# Patient Record
Sex: FEMALE | Race: WHITE | Employment: OTHER | URBAN - METROPOLITAN AREA
[De-identification: names, ages, dates, MRNs, and addresses within clinical notes are randomized per-mention and may not be internally consistent; named-entity substitution may affect disease eponyms.]

---

## 2017-01-02 ENCOUNTER — ANTICOAGULATION THERAPY VISIT (OUTPATIENT)
Dept: FAMILY MEDICINE | Facility: CLINIC | Age: 77
End: 2017-01-02
Payer: MEDICARE

## 2017-01-02 DIAGNOSIS — I48.91 ATRIAL FIBRILLATION (H): ICD-10-CM

## 2017-01-02 DIAGNOSIS — Z79.01 LONG-TERM (CURRENT) USE OF ANTICOAGULANTS: Primary | ICD-10-CM

## 2017-01-02 LAB — INR PPP: 2.7

## 2017-01-02 PROCEDURE — 99207 ZZC NO CHARGE NURSE ONLY: CPT | Performed by: INTERNAL MEDICINE

## 2017-01-02 NOTE — PROGRESS NOTES
ANTICOAGULATION FOLLOW-UP CLINIC VISIT    Patient Name:  Malgorzata Lan  Date:  1/2/2017  Contact Type:  Telephone/ [facility]    SUBJECTIVE:     Patient Findings     Positives No Problem Findings           OBJECTIVE    INR   Date Value Ref Range Status   01/02/2017 2.7  Final       ASSESSMENT / PLAN  INR assessment THER    Recheck INR In: 2 WEEKS    INR Location Home INR      Anticoagulation Summary as of 1/2/2017     INR goal 2.0-3.0   Selected INR 2.7 (1/2/2017)   Maintenance plan 5 mg (2.5 mg x 2) every day   Full instructions 5 mg every day   Weekly total 35 mg   No change documented Bharati Saini RN   Plan last modified Bharati Saini RN (10/4/2016)   Next INR check 1/16/2017   Priority INR   Target end date     Indications   Long-term (current) use of anticoagulants [Z79.01] [Z79.01]  Atrial fibrillation (H) [I48.91]         Anticoagulation Episode Summary     INR check location Home Draw    Preferred lab     Send INR reminders to [facility] ANTICOAGULATION    Comments [facility] Home INR      Anticoagulation Care Providers     Provider Role Specialty Phone number    Derek Michelle Crow MD [facility] [phone]            See the Encounter Report to view Anticoagulation Flowsheet and Dosing Calendar (Go to Encounters tab in chart review, and find the Anticoagulation Therapy Visit)    Patient confirms and agrees to dosing schedule as above. She is a home INR patient through [facility]. Dosing based on [facility] Protocol and Provider directed care plan.      Bharati Saini RN

## 2017-01-05 ENCOUNTER — TELEPHONE (OUTPATIENT)
Dept: FAMILY MEDICINE | Facility: CLINIC | Age: 77
End: 2017-01-05

## 2017-01-06 ENCOUNTER — TELEPHONE (OUTPATIENT)
Dept: FAMILY MEDICINE | Facility: CLINIC | Age: 77
End: 2017-01-06

## 2017-01-06 NOTE — TELEPHONE ENCOUNTER
Per patient, she had a nose bleed yesterday that lasted on and off from 10:00 am to 9:00 pm.  She called the [facility] nurse line and they gave her advice that helped her control and eventually stop the nose bleed.  Patient states that she was instructed to let PCP know and that she feels tired and weak today, but is doing better.  Patient states that she is going to make appointment with PCP.  EDWARD Youssef CMA January 6, 2017 12:12 PM

## 2017-01-06 NOTE — TELEPHONE ENCOUNTER
[facility] Medical supplies as medicare is not contracted with [employer]. Pt is almost out of strips.   [facility] [employer] sent over a faxed request for an RX for the strips.

## 2017-01-06 NOTE — TELEPHONE ENCOUNTER
Faxed signed orders to [facility] for patient.  Called patient to let her know that orders were sent.  EDWARD Youssef CMA January 6, 2017 12:07 PM

## 2017-01-06 NOTE — TELEPHONE ENCOUNTER
I bet its the [facility] one  I'm sure I have filled it out in the past - may need to have it resent  It is not in my inbox

## 2017-01-09 DIAGNOSIS — G89.4 CHRONIC PAIN SYNDROME: Primary | ICD-10-CM

## 2017-01-09 NOTE — TELEPHONE ENCOUNTER
Controlled Substance Refill Request for traMADol (ULTRAM) 50 MG tablet  Problem List Complete:  Yes    Last Written Prescription Date:  8/26/2016  Last Fill Quantity: 240,   # refills: 1    Last Office Visit with [facility] primary care provider: 10/18/2016    Clinic visit frequency required: Q 6  months     Future Office visit:     Controlled substance agreement on file: Yes:  Date 8/4/2015.     Processing:  Patient will  in clinic   checked in past 6 months?  No, route to RN

## 2017-01-09 NOTE — TELEPHONE ENCOUNTER
Reason for Call:  Medication or medication refill:    Do you use a [facility]?  Name of the pharmacy and phone number for the current request:       Pt  written script in office      Name of the medication requested: traMADol (ULTRAM) 50 MG tablet    Other request: none    Can we leave a detailed message on this number? YES    Phone number patient can be reached at: Home number on file [phone] (home)    Best Time: anytime    Call taken on 1/9/2017 at 3:32 PM by Maria Ines Calderón

## 2017-01-12 RX ORDER — TRAMADOL HYDROCHLORIDE 50 MG/1
TABLET ORAL
Qty: 240 TABLET | Refills: 1 | Status: SHIPPED | OUTPATIENT
Start: 2017-01-12 | End: 2017-06-01

## 2017-01-13 ENCOUNTER — PRE VISIT (OUTPATIENT)
Dept: CARDIOLOGY | Facility: CLINIC | Age: 77
End: 2017-01-13

## 2017-01-13 ASSESSMENT — CHADS2 SCORE
STROK/TIA/THROM: NO
DIABETES: NO
AGE: >74
HTN: YES
CHF: NO
VASCULAR DISEASE: YES
SEX: FEMALE

## 2017-01-16 LAB — INR PPP: 2.7

## 2017-01-17 ENCOUNTER — ANTICOAGULATION THERAPY VISIT (OUTPATIENT)
Dept: FAMILY MEDICINE | Facility: CLINIC | Age: 77
End: 2017-01-17
Payer: MEDICARE

## 2017-01-17 DIAGNOSIS — I48.91 ATRIAL FIBRILLATION (H): ICD-10-CM

## 2017-01-17 DIAGNOSIS — Z79.01 LONG-TERM (CURRENT) USE OF ANTICOAGULANTS: Primary | ICD-10-CM

## 2017-01-17 PROCEDURE — 99207 ZZC NO CHARGE NURSE ONLY: CPT | Performed by: INTERNAL MEDICINE

## 2017-01-17 NOTE — PROGRESS NOTES
ANTICOAGULATION FOLLOW-UP CLINIC VISIT    Patient Name:  Malgorzata Lan  Date:  1/17/2017  Contact Type:  Telephone    SUBJECTIVE:        OBJECTIVE    INR   Date Value Ref Range Status   01/16/2017 2.7  Final       ASSESSMENT / PLAN  INR assessment THER    Recheck INR In: 2 WEEKS    INR Location Home INR    Billed home INR No      Anticoagulation Summary as of 1/17/2017     INR goal 2.0-3.0   Selected INR 2.7 (1/16/2017)   Maintenance plan 5 mg (2.5 mg x 2) every day   Full instructions 5 mg every day   Weekly total 35 mg   No change documented Marifer Zaragoza RN   Plan last modified Bharati Saini RN (10/4/2016)   Next INR check 1/31/2017   Priority INR   Target end date     Indications   Long-term (current) use of anticoagulants [Z79.01] [Z79.01]  Atrial fibrillation (H) [I48.91]         Anticoagulation Episode Summary     INR check location Home Draw    Preferred lab     Send INR reminders to  ANTICOAGULATION    Comments [facility] Home INR      Anticoagulation Care Providers     Provider Role Specialty Phone number    Derek Michelle Crow MD [facility] [phone]            See the Encounter Report to view Anticoagulation Flowsheet and Dosing Calendar (Go to Encounters tab in chart review, and find the Anticoagulation Therapy Visit)    Dosage adjustment made based on physician directed care plan. Fax INR result received from [facility] INR 2.7 on 1/16/17.   Spoke with patient and she will continue same Warfarin dosing. Recheck 2-3 weeks, per [facility] recommendation.    Marifer Zaragoza RN

## 2017-01-23 ENCOUNTER — PRE VISIT (OUTPATIENT)
Dept: CARDIOLOGY | Facility: CLINIC | Age: 77
End: 2017-01-23

## 2017-01-23 ASSESSMENT — CHADS2 SCORE
HTN: YES
VASCULAR DISEASE: YES
AGE: >74
DIABETES: NO
SEX: FEMALE
CHF: NO
STROK/TIA/THROM: NO

## 2017-01-25 ENCOUNTER — OFFICE VISIT (OUTPATIENT)
Dept: CARDIOLOGY | Facility: CLINIC | Age: 77
End: 2017-01-25
Payer: MEDICARE

## 2017-01-25 VITALS — SYSTOLIC BLOOD PRESSURE: 112 MMHG | HEIGHT: 65 IN | HEART RATE: 82 BPM | DIASTOLIC BLOOD PRESSURE: 68 MMHG

## 2017-01-25 DIAGNOSIS — I50.9 CONGESTIVE HEART FAILURE, UNSPECIFIED CONGESTIVE HEART FAILURE CHRONICITY, UNSPECIFIED CONGESTIVE HEART FAILURE TYPE: ICD-10-CM

## 2017-01-25 DIAGNOSIS — I48.20 CHRONIC ATRIAL FIBRILLATION (H): ICD-10-CM

## 2017-01-25 DIAGNOSIS — I48.91 ATRIAL FIBRILLATION, UNSPECIFIED TYPE (H): Primary | ICD-10-CM

## 2017-01-25 PROCEDURE — 99204 OFFICE O/P NEW MOD 45 MIN: CPT | Performed by: INTERNAL MEDICINE

## 2017-01-25 PROCEDURE — 93000 ELECTROCARDIOGRAM COMPLETE: CPT | Performed by: INTERNAL MEDICINE

## 2017-01-25 RX ORDER — CARVEDILOL 6.25 MG/1
12.5 TABLET ORAL 2 TIMES DAILY WITH MEALS
Qty: 180 TABLET | Refills: 1 | Status: SHIPPED | OUTPATIENT
Start: 2017-01-25 | End: 2017-06-14

## 2017-01-25 RX ORDER — TORSEMIDE 10 MG/1
20 TABLET ORAL AT BEDTIME
COMMUNITY
End: 2017-02-16

## 2017-01-25 NOTE — Clinical Note
2017      Clair Michelle DO    [address], [address]    [place]  [address]       RE: Malgorzata Lan   MRN: [id number]   : 1940      Dear Dr. Michelle:      I saw Ms. Lan for evaluation of atrial fibrillation with fast ventricular rate.  She is a 76-year-old white female who had sinus node dysfunction and previously received a dual-chamber pacemaker in .  Since then, the patient has been known to have atrial fibrillation.  She is currently on digoxin 0.125 mg once a day and carvedilol 6.25 mg p.o. b.i.d.  The patient recently presented to the ER with nose bleeding, and she appeared to have a fast ventricular rate which was also confirmed on pacemaker interrogation.  Her nose bleeding has stopped, and she is on warfarin without rebleeding.  She does not feel palpitation and denies chest pain or shortness of breath.      Her past medical history is quite complicated with severe obesity, history of lung cancer, chronic renal insufficiency, hyperparathyroidism, history of moderate aortic stenosis, obstructive sleep apnea, COPD.      PHYSICAL EXAMINATION:    VITAL SIGNS:  On examination, blood pressure was 112/68, heart rate 82 beats per minute.    GENERAL:  She uses a scooter to move around.   LUNGS:  Clear.   CARDIAC:  The cardiac rhythm was irregularly irregular.  The heart sounds were remote.  There was no murmur.   ABDOMEN:  Showed severe obesity.   EXTREMITIES:  There was no pedal edema.      EKG showed atrial fibrillation with average ventricular rate 74 beats per minute and intermittent ventricular pacing.      ASSESSMENT AND RECOMMENDATIONS:  Ms. Lan is a 76-year-old white female status post dual-chamber pacemaker implant for sinus node dysfunction.  She is now in persistent atrial fibrillation.  Her heart rate is controlled during today's clinic visit, and the patient has no apparent symptoms at the present time.  At this point, I would like to  change the dose of her carvedilol from 6.25 to 12.5 mg p.o. b.i.d.  If she continues to do well, she would not need AV node ablation.  If there is any suspicion of rapid ventricular rate, we will either reinterrogate the pacemaker or have her wear a 24-hour Holter monitor.  Otherwise, she will continue Cardiology followup with Dr. Shine.      Sincerely,            Angel Palmer MD

## 2017-01-25 NOTE — MR AVS SNAPSHOT
After Visit Summary   1/25/2017    Malgorzata Lan    MRN: [id number]           Patient Information     Date Of Birth          1940        Visit Information        Provider Department      1/25/2017 1:45 PM Angel Palmer MD [facility]        Today's Diagnoses     Atrial fibrillation, unspecified type (H)    -  1     Chronic atrial fibrillation (H)         Congestive heart failure, unspecified congestive heart failure chronicity, unspecified congestive heart failure type (H)            Follow-ups after your visit        Your next 10 appointments already scheduled     Mar 08, 2017  3:00 PM   Remote PPM Check with BUCIO TECH1   [facility] ([facility])    [address] [place], [place] [address]  [facility] [address] [phone]           This appointment is for a remote check of your pacemaker.  This is not an appointment at the office.              If Something Goes Wrong     I will check my blood sugars twice a day     Goal Comments - Note created  7/17/2014 10:25 AM by Jeannine Baker RN    As of today's date 7/17/2014 goal is met at 76 - 100%.   Goal Status:  Active        Who to contact     If you have questions or need follow up information about today's clinic visit or your schedule please contact [facility] directly at [phone].  Normal or non-critical lab and imaging results will be communicated to you by MyChart, letter or phone within 4 business days after the clinic has received the results. If you do not hear from us within 7 days, please contact the clinic through MyChart or phone. If you have a critical or abnormal lab result, we will notify you by phone as soon as possible.  Submit refill requests through [employer] or call your pharmacy and they will forward the refill request to us. Please allow 3 business days for your refill to be completed.           "Additional Information About Your Visit        MyChart Information     [email or web address] lets you send messages to your doctor, view your test results, renew your prescriptions, schedule appointments and more. To sign up, go to [email or web address] . Click on \"Log in\" on the left side of the screen, which will take you to the Welcome page. Then click on \"Sign up Now\" on the right side of the page.     You will be asked to enter the access code listed below, as well as some personal information. Please follow the directions to create your username and password.     Your access code is: [email or web address]  Expires: 2017  1:57 PM     Your access code will  in 90 days. If you need help or a new code, please call your [place] clinic or [phone].        Care EveryWhere ID     This is your Care EveryWhere ID. This could be used by other organizations to access your [facility] medical records  [id number]        Your Vitals Were     Pulse Height Last Period             82 1.651 m (5' 5\") (LMP Unknown)          Blood Pressure from Last 3 Encounters:   17 112/68   10/18/16 121/68   16 122/64    Weight from Last 3 Encounters:   10/18/16 129.729 kg (286 lb)   16 127.914 kg (282 lb)   16 127.824 kg (281 lb 12.8 oz)              We Performed the Following     EKG 12-lead complete w/read - Clinics (performed today)          Today's Medication Changes          These changes are accurate as of: 17  1:57 PM.  If you have any questions, ask your nurse or doctor.               These medicines have changed or have updated prescriptions.        Dose/Directions    carvedilol 6.25 MG tablet   Commonly known as:  COREG   This may have changed:  how much to take   Used for:  Chronic atrial fibrillation (H), Congestive heart failure, unspecified congestive heart failure chronicity, unspecified congestive heart failure type (H)   Changed by:  Angel Palmer MD        Dose:  12.5 mg   Take 2 tablets (12.5 mg) by " mouth 2 times daily (with meals)   Quantity:  180 tablet   Refills:  1            Where to get your medicines      These medications were sent to [facility] - [place], [place] - [address] AT [place] [address]  [address], [facility] [address]     Phone:  [phone]    - carvedilol 6.25 MG tablet             Primary Care Provider Office Phone # Fax #    Clair Michelle, [phone]       [facility] [place] [address]  [place] [address]        Goals        Patient-Stated    I will call my PCP right away if I have increased shortness of breath.       Goal Comments - Note created  7/17/2014 10:27 AM by Jeannine Baker RN    As of today's date 7/17/2014 goal is met at 51 - 75%.   Goal Status:  Active      I will weigh myself daily and records results     Goal Comments - Note created  7/17/2014 10:25 AM by Jeannine Baker RN    As of today's date 7/17/2014 goal is met at 76 - 100%.   Goal Status:  Active        Thank you!     Thank you for choosing [facility] [place]  for your care. Our goal is always to provide you with excellent care. Hearing back from our patients is one way we can continue to improve our services. Please take a few minutes to complete the written survey that you may receive in the mail after your visit with us. Thank you!             Your Updated Medication List - Protect others around you: Learn how to safely use, store and throw away your medicines at www.disposemymeds.org.          This list is accurate as of: 1/25/17  1:57 PM.  Always use your most recent med list.                   Brand Name Dispense Instructions for use    acetaminophen 650 MG 8 hour tablet     100 tablet    Take 650 mg by mouth every 6 hours as needed for mild pain       albuterol (5 MG/ML) 0.5% neb solution    PROVENTIL    60 vial    Take 0.5 mLs (2.5 mg) by nebulization every 4 hours as needed for wheezing or shortness  of breath / dyspnea (dyspnea)       alum & mag hydroxide-simethicone 400-400-40 MG/5ML Susp suspension    MYLANTA ES/MAALOX  ES     Take 15-30 mLs by mouth every 4 hours as needed for indigestion       ASPIRIN NOT PRESCRIBED    INTENTIONAL    0 each    Antiplatelet medication not prescribed intentionally due to Current anticoagulant therapy (warfarin/enoxaparin)       atorvastatin 40 MG tablet    LIPITOR    90 tablet    Take 1 tablet (40 mg) by mouth At Bedtime       betamethasone valerate 0.1 % ointment    VALISONE    15 g    Apply topically daily To inside of ears.       blood glucose monitoring test strip    no brand specified    3 Month    Use to test blood sugars 3 -4  times daily or as directed       budesonide 0.5 MG/2ML neb solution    PULMICORT    60 ampule    Take 2 mLs (0.5 mg) by nebulization 2 times daily as needed       calcitonin (salmon) 200 UNIT/ACT nasal spray    MIACALCIN    3.7 mL    Spray 1 spray into one nostril alternating nostrils daily Alternate nostril each day. Right Nostril on Even Days Left Nostril on Odd Days       carvedilol 6.25 MG tablet    COREG    180 tablet    Take 2 tablets (12.5 mg) by mouth 2 times daily (with meals)       CINNAMON PO      Take 1,000 mg by mouth daily       cod liver oil Caps capsule      Take 1 capsule by mouth daily       digoxin 125 MCG tablet    LANOXIN    30 tablet    Take 1 tablet (125 mcg) by mouth daily       fluticasone 50 MCG/ACT spray    FLONASE    3 Bottle    Spray 1-2 sprays into both nostrils daily       latanoprost 0.005 % ophthalmic solution    XALATAN     Place 1 drop into both eyes At Bedtime.       lisinopril 5 MG tablet    PRINIVIL/ZESTRIL    90 tablet    Take 1 tablet (5 mg) by mouth daily       meclizine 25 MG tablet    ANTIVERT    180 tablet    Take 1 tablet (25 mg) by mouth 3 times daily as needed for dizziness       megestrol 40 MG tablet    MEGACE         nitroglycerin 0.4 MG sublingual tablet    NITROSTAT    30 tablet    If chest pain  place under tongue call 911 if pain not resolved/ repeat every 5 min for total of 3 doses       NovoLIN MIX 70/30 VIAL injection   Generic drug:  insulin NPH-insulin regular     10 mL    Inject 30 Units Subcutaneous 2 times daily (with meals)       oxybutynin 5 MG tablet    DITROPAN    180 tablet    Take 1 tablet (5 mg) by mouth 2 times daily       pentoxifylline 400 MG CR tablet    TRENtal    90 tablet    Take 1 tablet (400 mg) by mouth 3 times daily (with meals)       polyethylene glycol Packet    MIRALAX/GLYCOLAX     Take 1 packet by mouth 2 times daily as needed for constipation       potassium chloride SA 20 MEQ CR tablet    K-DUR/KLOR-CON M    180 tablet    Take 1 tablet (20 mEq) by mouth 2 times daily       saccharomyces boulardii 250 MG capsule    FLORASTOR    14 capsule    Take 1 capsule (250 mg) by mouth 2 times daily       sennosides 8.6 MG tablet    SENOKOT     Take 1-4 tablets by mouth daily as needed for constipation       torsemide 10 MG tablet    DEMADEX     Take 20 mg by mouth At Bedtime       traMADol 50 MG tablet    ULTRAM    240 tablet    TAKE 1-2 TABLET BY MOUTH EVERY 6 HOURS AS NEEDED FOR PAIN       Vitamin B 12 100 MCG Lozg     100 lozenge    Take 1,000 mcg by mouth daily       vitamin D3 2000 UNITS Caps     90 capsule    Take 2,000 Units by mouth daily       * Warfarin Therapy Reminder      1 each continuous prn       * JANTOVEN 2.5 MG tablet   Generic drug:  warfarin     100 tablet    TK 3 TO 4 TS PO QD OR UTD BY INR CLINIC       * Notice:  This list has 2 medication(s) that are the same as other medications prescribed for you. Read the directions carefully, and ask your doctor or other care provider to review them with you.

## 2017-01-25 NOTE — PROGRESS NOTES
HPI and Plan:   See dictation    Orders Placed This Encounter   Procedures     EKG 12-lead complete w/read - Clinics (performed today)       Orders Placed This Encounter   Medications     torsemide (DEMADEX) 10 MG tablet     Sig: Take 20 mg by mouth At Bedtime     carvedilol (COREG) 6.25 MG tablet     Sig: Take 2 tablets (12.5 mg) by mouth 2 times daily (with meals)     Dispense:  180 tablet     Refill:  1       Medications Discontinued During This Encounter   Medication Reason     torsemide (DEMADEX) 10 MG tablet Medication Reconciliation Clean Up     carvedilol (COREG) 6.25 MG tablet Reorder         Encounter Diagnoses   Name Primary?     Atrial fibrillation, unspecified type (H) Yes     Chronic atrial fibrillation (H)      Congestive heart failure, unspecified congestive heart failure chronicity, unspecified congestive heart failure type (H)        CURRENT MEDICATIONS:  Current Outpatient Prescriptions   Medication Sig Dispense Refill     torsemide (DEMADEX) 10 MG tablet Take 20 mg by mouth At Bedtime       carvedilol (COREG) 6.25 MG tablet Take 2 tablets (12.5 mg) by mouth 2 times daily (with meals) 180 tablet 1     traMADol (ULTRAM) 50 MG tablet TAKE 1-2 TABLET BY MOUTH EVERY 6 HOURS AS NEEDED FOR PAIN 240 tablet 1     meclizine (ANTIVERT) 25 MG tablet Take 1 tablet (25 mg) by mouth 3 times daily as needed for dizziness 180 tablet 1     digoxin (LANOXIN) 125 MCG tablet Take 1 tablet (125 mcg) by mouth daily 30 tablet 11     calcitonin, salmon, (MIACALCIN) 200 UNIT/ACT nasal spray Spray 1 spray into one nostril alternating nostrils daily Alternate nostril each day. Right Nostril on Even Days Left Nostril on Odd Days 3.7 mL 3     fluticasone (FLONASE) 50 MCG/ACT nasal spray Spray 1-2 sprays into both nostrils daily 3 Bottle 3     potassium chloride SA (K-DUR,KLOR-CON M) 20 MEQ tablet Take 1 tablet (20 mEq) by mouth 2 times daily 180 tablet 1     albuterol (PROVENTIL) (5 MG/ML) 0.5% nebulizer solution Take 0.5 mLs  (2.5 mg) by nebulization every 4 hours as needed for wheezing or shortness of breath / dyspnea (dyspnea) 60 vial 1     atorvastatin (LIPITOR) 40 MG tablet Take 1 tablet (40 mg) by mouth At Bedtime 90 tablet 3     budesonide (PULMICORT) 0.5 MG/2ML nebulizer solution Take 2 mLs (0.5 mg) by nebulization 2 times daily as needed 60 ampule 1     Cyanocobalamin (VITAMIN B 12) 100 MCG LOZG Take 1,000 mcg by mouth daily 100 lozenge 1     Cholecalciferol (VITAMIN D3) 2000 UNITS CAPS Take 2,000 Units by mouth daily 90 capsule 1     NOVOLIN MIX 70/30 VIAL (70-30) 100 UNIT/ML susp Inject 30 Units Subcutaneous 2 times daily (with meals) 10 mL 11     lisinopril (PRINIVIL,ZESTRIL) 5 MG tablet Take 1 tablet (5 mg) by mouth daily 90 tablet 3     nitroglycerin (NITROSTAT) 0.4 MG SL tablet If chest pain place under tongue call 911 if pain not resolved/ repeat every 5 min for total of 3 doses 30 tablet 3     oxybutynin (DITROPAN) 5 MG tablet Take 1 tablet (5 mg) by mouth 2 times daily 180 tablet 2     pentoxifylline (TRENTAL) 400 MG CR tablet Take 1 tablet (400 mg) by mouth 3 times daily (with meals) 90 tablet 3     JANTOVEN 2.5 MG tablet TK 3 TO 4 TS PO QD OR UTD BY INR CLINIC 100 tablet 3     megestrol (MEGACE) 40 MG tablet   1     saccharomyces boulardii (FLORASTOR) 250 MG capsule Take 1 capsule (250 mg) by mouth 2 times daily 14 capsule      sennosides (SENOKOT) 8.6 MG tablet Take 1-4 tablets by mouth daily as needed for constipation       acetaminophen 650 MG 8 hour tablet Take 650 mg by mouth every 6 hours as needed for mild pain 100 tablet      alum & mag hydroxide-simethicone (MYLANTA ES/MAALOX  ES) 400-400-40 MG/5ML SUSP Take 15-30 mLs by mouth every 4 hours as needed for indigestion  0     Cod Liver Oil CAPS Take 1 capsule by mouth daily       polyethylene glycol (MIRALAX/GLYCOLAX) packet Take 1 packet by mouth 2 times daily as needed for constipation        CINNAMON PO Take 1,000 mg by mouth daily       latanoprost (XALATAN)  0.005 % ophthalmic solution Place 1 drop into both eyes At Bedtime.       blood glucose monitoring (NO BRAND SPECIFIED) test strip Use to test blood sugars 3 -4  times daily or as directed 3 Month 3     ASPIRIN NOT PRESCRIBED (INTENTIONAL) Antiplatelet medication not prescribed intentionally due to Current anticoagulant therapy (warfarin/enoxaparin) 0 each 0     betamethasone valerate (VALISONE) 0.1 % ointment Apply topically daily To inside of ears. 15 g 0     [DISCONTINUED] carvedilol (COREG) 6.25 MG tablet Take 1 tablet (6.25 mg) by mouth 2 times daily (with meals) 180 tablet 1     Warfarin Therapy Reminder 1 each continuous prn         ALLERGIES     Allergies   Allergen Reactions     Food Anaphylaxis     With green peppers     Codeine Sulfate Swelling     Throats swell     Flagyl [Metronidazole Hcl] Swelling     Hydrocodone Visual Disturbance     Pantoprazole Unknown     Penicillins Unknown     Hasn't had it for 30 years     Diltiazem Rash       PAST MEDICAL HISTORY:  Past Medical History   Diagnosis Date     COPD (chronic obstructive pulmonary disease) (H)      Glaucoma      High cholesterol      Chronic leg pain      Hypertension      Diabetes mellitus (H)      Obstructive sleep apnea      wears CPAP     Diastolic dysfunction      Influenza A with pneumonia 3-2014     hospitalized      Respiratory failure (H) 3-2014     hospitalized     Congestive heart failure, unspecified      worsened during hospitalization/ diastolic failure  echo 3-2014 EF 55-60%/      Aortic stenosis, moderate      on echo 3-14      Peripheral vascular disease (H)      Morbid obesity (H)      DJD (degenerative joint disease)      Depression      Hyperparathyroidism (H)      Sinus node dysfunction (H)      TACHYBRADY SYNDROME s/p PPM     Syncope      Hyperlipidemia      Renal insufficiency      Pickwickian syndrome (H)      Squamous cell carcinoma of lung, stage I (H)      Pancytopenia (H)      Lung cancer (H)      Persistent atrial  fibrillation (H) 06/08/2004       PAST SURGICAL HISTORY:  Past Surgical History   Procedure Laterality Date     Tonsils       Orthopedic surgery       t oes     Cholecystectomy       Sinus surgery       Bone marrow biopsy, bone specimen, needle/trocar  10/30/2013     Procedure: BIOPSY BONE MARROW;  BONE MARROW BIOPSY ;  Surgeon: Kimani Lobo MD;  Location:  GI     Implant pacemaker  7/31/2013     Single chamber [place] PPM      Thoracoscopic wedge resection lung Right 3/17/2015     Procedure: THORACOSCOPIC WEDGE RESECTION LUNG;  Surgeon: Santos Crenshaw MD;  Location:  OR     Irrigation and debridement lower extremity, combined Right 7/10/2015     Procedure: COMBINED IRRIGATION AND DEBRIDEMENT LOWER EXTREMITY;  Surgeon: Yandel Fair MD;  Location:  OR     Graft skin split thickness from extremity Right 8/4/2015     Procedure: GRAFT SKIN SPLIT THICKNESS FROM EXTREMITY;  Surgeon: Yandel Fair MD;  Location:  SD     Apply wound vac Right 8/4/2015     Procedure: APPLY WOUND VAC;  Surgeon: Yandel Fair MD;  Location:  SD     Dilation and curettage N/A 3/18/2016     Procedure: DILATION AND CURETTAGE;  Surgeon: Gilmer Cruz MD;  Location:  OR     Davinci hysterectomy total, bilateral salpingo-oophorectomy, node dissection, combined N/A 3/30/2016     Procedure: COMBINED DAVINCI HYSTERECTOMY TOTAL, SALPINGO-OOPHORECTOMY, NODE DISSECTION;  Surgeon: Kristin Arriaza MD;  Location:  OR       FAMILY HISTORY:  Family History   Problem Relation Age of Onset     CANCER Mother 83     Lung cancer     GASTROINTESTINAL DISEASE Father 69     Ulcer bleed     HEART DISEASE Brother 58     CANCER Sister 60     Lung cancer     CANCER Sister 58     Lung cancer       SOCIAL HISTORY:  Social History     Social History     Marital Status:      Spouse Name: N/A     Number of Children: N/A     Years of Education: N/A     Social History Main Topics     Smoking  status: Former Smoker -- 1.00 packs/day for 30 years     Types: Cigarettes     Quit date: 08/14/1993     Smokeless tobacco: Never Used     Alcohol Use: 0.0 oz/week     0 Standard drinks or equivalent per week      Comment: 2 a week      Drug Use: No     Sexual Activity:     Partners: Male     Other Topics Concern     Parent/Sibling W/ Cabg, Mi Or Angioplasty Before 65f 55m? No     Caffeine Concern Yes     3-4 cups of coffee     Special Diet No     Exercise No     Social History Narrative        _______        Malgorzata Lan  MRN# [id number]     YOB: 1940  Age: 75 year old         Date of Admission: 8/4/2015    Date of Discharge: 8/5/2015    Admitting Physician: Yandel Fair MD    Discharging Service: [facility] General Surgery     Primary Provider: Michelle Reed (General)        Discharge Diagnosis:     Principle Diagnosis:    right calf wound    Wound, open    Calf ulcer, right, with unspecified severity        Brief HPI: Malgorzata Lan is a 75 year old year-old female who presented to the [facility] for an elective split thickness skin grafting. She was being followed closely as an outpatient in wound clinic by Dr. Fair. She recently developed a significant ulceration in her right anterior lateral calf. Operative debridement was undertaken previously with good results with plans for eventual STSG which was undertaken 8/5/15 without complications.    Hospital Course: Malgorzata Lan underwent the above named procedure without complications. Please see op note for further details. The patient had a routine hospital course and recovered as anticipated. By POD #1, she had advanced to a regular diet and was transitioned successfully to oral pain medications. She was deemed ready for discharge on POD #1. She will follow-up in wound clinic on Monday 8/10/15 as previously scheduled for wound check.         Inpatient Consultations: No consultations were requested  "during this admission        Procedures:     Split thickness skin grafting        Labs/Imaging:     Results for orders placed or performed during the hospital encounter of 08/04/15 (from the past 24 hour(s))     Potassium     Result  Value  Ref Range      Potassium  4.2  3.4 - 5.3 mmol/L     INR     Result  Value  Ref Range      INR  2.84 (H)  0.86 - 1.14     Hemoglobin     Result  Value  Ref Range      Hemoglobin  9.0 (L)  11.7 - 15.7 g/dL     Glucose by meter     Result  Value  Ref Range      Glucose  183 (H)  70 - 99 mg/dL     Glucose by meter     Result  Value  Ref Range      Glucose  154 (H)  70 - 99 mg/dL     Glucose by meter     Result  Value  Ref Range      Glucose  142 (H)  70 - 99 mg/dL     Glucose by meter     Result  Value  Ref Range      Glucose  126 (H)  70 - 99 mg/dL     Glucose by meter     Result  Value  Ref Range      Glucose  118 (H)  70 - 99 mg/dL     INR     Result  Value  Ref Range      INR  3.89 (H)  0.86 - 1.14             Disposition:     Discharged to home         Discharge Condition    Discharge condition:  Stable     Discharge vitals:  Blood pressure 112/59, pulse 83, temperature 97.8  F (36.6  C), temperature source Oral, resp. rate 16, height 1.651 m (5' 5\"), weight 134.718 kg (297 lb), SpO2 100 %, not currently breastfeeding.         Discharge Medications:     Current Discharge Medication List         START taking these medications      Details     !! oxyCODONE-acetaminophen (PERCOCET) 5-325 MG per tablet  Take 1-2 tablets by mouth every 6 hours as needed for moderate to severe pain    Qty: 20 tablet, Refills: 0      Associated Diagnoses: Acute post-operative pain         !! - Potential duplicate medications found. Please discuss with provider.         CONTINUE these medications which have NOT CHANGED      Details     !! oxyCODONE-acetaminophen (PERCOCET) 5-325 MG per tablet  Take 1 tablet by mouth every 6 hours as needed for moderate to severe pain    Qty: 120 tablet, Refills: 0     "  Associated Diagnoses: Chronic pain         insulin NPH-insulin regular (NOVOLIN MIX 70/30 VIAL) VIAL 100 UNITS/ML susp  20 units before breakfast 10 units before dinner    Qty: 3 Month, Refills: 3      Associated Diagnoses: Diabetes mellitus, type 2         pentoxifylline (TRENTAL) 400 MG CR tablet  Take 1 tablet (400 mg) by mouth 3 times daily (with meals) Office visit due for additional refills. Call [phone] to schedule.    Qty: 90 tablet, Refills: 0      Associated Diagnoses: Cramp of limb         torsemide (DEMADEX) 10 MG tablet  Take 2 daily may on occasion double to 4 a day prn edema    Qty: 270 tablet, Refills: 1      Associated Diagnoses: Cardiomyopathy         fluticasone (FLONASE) 50 MCG/ACT nasal spray  Spray 1-2 sprays into both nostrils daily    Qty: 1 Package, Refills: 11      Associated Diagnoses: Right chronic serous otitis media         atorvastatin (LIPITOR) 40 MG tablet  Take 1 tablet (40 mg) by mouth daily Office visit with fasting labs due for additional refills. Call [phone] to schedule.    Qty: 30 tablet, Refills: 0      Associated Diagnoses: Other and unspecified hyperlipidemia         traMADol (ULTRAM) 50 MG tablet  Take 1 tablet (50 mg) by mouth every 6 hours as needed for moderate pain    Qty: 120 tablet, Refills: 3      Associated Diagnoses: Peripheral vascular disease         polyethylene glycol (MIRALAX/GLYCOLAX) packet  Take 1 packet by mouth daily as needed         lisinopril (PRINIVIL,ZESTRIL) 10 MG tablet  Take 0.5 tablets (5 mg) by mouth daily    Qty: 90 tablet, Refills: 3      Associated Diagnoses: Hypertension         potassium chloride SA (K-DUR,KLOR-CON M) 20 MEQ tablet  Take 1 tablet (20 mEq) by mouth 2 times daily    Qty: 180 tablet, Refills: 1      Associated Diagnoses: Hypertension; Congestive heart failure, unspecified         meclizine (ANTIVERT) 25 MG tablet  Take 1 tablet (25 mg) by mouth 4 times daily as needed    Qty: 360 tablet, Refills: 3      Associated  Diagnoses: Dizzy         calcitonin, salmon, (MIACALCIN) 200 UNIT/ACT nasal spray  Spray 1 spray into one nostril alternating nostrils daily Alternate nostril each day.    Qty: 3 Bottle, Refills: 3      Associated Diagnoses: Osteoporosis, unspecified         oxybutynin (DITROPAN) 5 MG tablet  TAKE 1 TABLET BY MOUTH TWICE DAILY    Qty: 180 tablet, Refills: 3      Associated Diagnoses: Unspecified urinary incontinence         carvedilol (COREG) 6.25 MG tablet  Take 1 tablet (6.25 mg) by mouth 2 times daily (with meals)    Qty: 60 tablet, Refills: 1      Associated Diagnoses: Cardiomyopathy         betamethasone valerate (VALISONE) 0.1 % ointment  Apply topically 2 times daily    Qty: 15 g, Refills: 0         Cholecalciferol (VITAMIN D3 PO)  Take 2,000 Units by mouth daily         budesonide (PULMICORT) 0.5 MG/2ML nebulizer solution  Take 2 mLs (0.5 mg) by nebulization 2 times daily    Qty: 3 Box, Refills: 1      Associated Diagnoses: COPD exacerbation         Cyanocobalamin (VITAMIN B 12 PO)  Take 1,000 mcg by mouth daily         CINNAMON PO  Take 1,000 mg by mouth daily         latanoprost (XALATAN) 0.005 % ophthalmic solution  Place 1 drop into both eyes At Bedtime.         WARFARIN SODIUM PO  Take 5 mg by mouth daily         collagenase ointment  Apply topically daily    Qty: 90 g, Refills: 3      Associated Diagnoses: Open wound         lidocaine (XYLOCAINE) 2 % jelly  Apply topically as needed for moderate pain    Qty: 85 mL, Refills: 3      Associated Diagnoses: Open wound         ipratropium - albuterol 0.5 mg/2.5 mg/3 mL (DUONEB) 0.5-2.5 (3) MG/3ML nebulization  Take 1 vial (3 mLs) by nebulization 4 times daily    Qty: 360 mL, Refills: 5      Associated Diagnoses: COPD exacerbation         albuterol (PROVENTIL) (5 MG/ML) 0.5% nebulizer solution  Take 0.5 mLs (2.5 mg) by nebulization every 4 hours as needed for wheezing or shortness of breath / dyspnea (dyspnea)    Qty: 60 vial      Associated Diagnoses: COPD  exacerbation         Nitroglycerin (NITROSTAT SL)  Place 0.4 mg under the tongue Repeat in 5 min x 2 if not gone.         !! - Potential duplicate medications found. Please discuss with provider.                 Discharge Instructions:     Follow-up Appointments     Follow-up and recommended labs and tests     Follow up with Dr. Fair , at (location with clinic name or city)     [facility] wound clinic, as previously scheduled. No follow up labs or test     are needed.     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 The following problems were addressed during her hospitalization:    RLE Non-healing wound    long-standing right lower extremity wound, nonhealing, with purulent debris.     Previously biopsied to rule out cancer.     X-ray of the lower extremity as well as an ultrasound of the arterial flow was completed without acute abnormality per report.     Treated with ABX initially with Vanco and Ertapenem.     Seen by Surgery Dr. Fair and taken for debridement of the wound on 7/10.     Also seen by ID. Symptoms improved with regards to pain s/p debridement.     Wound cultures + for E.Coli, Pseudomonas, GNR and enterococcus. BC 1 of 2 on 7/8 + for Strep viridans. Repeats on 7/9 and 7/10 NGTD.     Suspect contaminant.     Antibiotics not continued on discharge as debridement was felt to be sufficient.     To follow up with Dr. Fair in the [facility] to have a wound vac placed for 1 week and then to undergo a skin graft. Continue with Aquacel for wound.         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 To  Follow with [facility].         H/o Chronic atrial fibrillation/CHF/HTN/HLD:     The patient has a pacemaker. Last echocardiogram from 6/2014 was 55% to 60%. She currently demonstrates no signs of volume overload, but does have some chronic lower extremity edema. Fluids discontinued, but intake remains below baseline with borderline BP. Continue with prior to admission lisinopril, Torsemide and Coreg. Warfarin held for procedure but resumed post op. INR was 1.43 on d/c. Bridged with Lovenox gien her CHADS2 of 4.         Insulin-dependent diabetes mellitus:     Recent hemoglobin A1c is 6.4%. Home regimen includes Novolin 70/30 insulin 30 units with breakfast and 20-26 units with dinner. Was on 15 units due to decreased po intake and increased to 20 Q am 7/11. C/w 10 units at dinner. This is the dose she was discharged on. To adjust back to her pta dose as needed.         Chronic kidney disease, stage 3:     Baseline creatinine is 1.1-1.3. Currently at her baseline.         Pancytopenia:     This is chronic. She also has a history of MGUS with a negative bone marrow biopsy in 2013. Iron studies, b12, folate, retic, ps completed.             Consultations This Hospital Stay     SOCIAL WORK IP CONSULT    VASCULAR SURGERY IP CONSULT    INFECTIOUS DISEASES IP CONSULT        INR     Home RN to draw and send to PCP/INR Clinic.     Home care nursing referral     Home care PT referral     Home care OT referral     Follow Up and recommended labs and tests     Follow up with primary care provider, Michelle Reed within 14 days for hospital follow- up. No follow up labs or test are needed.    Follow up with Dr. Fair at the [facility] for continued RLE wound care. Plan is for a wound vac x 1 week to be applied in clinic followed by a skin graft.     Discharge Instructions     Continue with Lovenox BID until INR 2-3.    Adjust Lantus based on Finger sticks when you return home. You were on 30 units in the morning and 20 Units  "at night prior to coming to the hospital. I have reduced this to 20 units in the morning and 10 units at night due to you not requiring as much insulin here.     MD face to face encounter     Nurse is needed for complex aftercare of surgical procedures because the patient needs instruction and cannot perform care on their own due to location of wound and steps involved.     Physical therapy services are needed to assess and treat the following functional impairments: Deconditioning .     Occupational therapy services are needed to assess and treat cognitive ability and address ADL safety due to impairment in Ambulation and deconditioning.                Results for orders placed or performed during the hospital encounter of 07/08/15     US Lower Extremity Arterial Duplex Right      Narrative           Impression      IMPRESSION:    1. Patent right lower extremity arterial vessels without evidence of    significant stenotic or occlusive disease.    2. Right popliteal cyst.          Impression: Deep venous insufficiency in the right common femoral vein    and superficial venous insufficiency in the right greater saphenous    vein as described above.        HAROON VANG MD                Review of Systems:  Skin:  Negative       Eyes:  Positive for glasses    ENT:  Negative      Respiratory:  Positive for cough     Cardiovascular:    Positive for;palpitations;dizziness;lightheadedness    Gastroenterology: Negative      Genitourinary:  Negative      Musculoskeletal:  Negative      Neurologic:  Negative      Psychiatric:  Negative      Heme/Lymph/Imm:  Positive for allergies    Endocrine:  Positive for diabetes      Physical Exam:  Vitals: /68 mmHg  Pulse 82  Ht 1.651 m (5' 5\")  Wt   LMP  (LMP Unknown)    Constitutional:  cooperative;alert and oriented;well developed;well nourished morbidly obese      Skin:  warm and dry to the touch        Head:  normocephalic        Eyes:  pupils equal and round;sclera " white        ENT:  no pallor or cyanosis        Neck:  carotid pulses are full and equal bilaterally;JVP normal;no carotid bruit        Chest:  clear to auscultation     few left base rales.     Cardiac: regular rhythm;normal S1 and S2 irregularly irregular rhythm     systolic murmur grade 1        Abdomen:  abdomen soft;non-tender obese      Vascular: pulses full and equal                                        Extremities and Back:  no deformities, clubbing, cyanosis, erythema observed stasis pigmentation bilateral LE edema;1+;pitting     tight bilateral lower extremity edema    Neurological:  affect appropriate, oriented to time, person and place   in a wheelchair          CC  Clair Michelle, [facility]  [address]  [place] [address]

## 2017-01-26 NOTE — PROGRESS NOTES
2017      Clair Michelle DO    [address], [address]    [place]  [address]       RE: Malgorzata Lan   MRN: [id number]   : 1940      Dear Dr. Michelle:      I saw Ms. Lan for evaluation of atrial fibrillation with fast ventricular rate.  She is a 76-year-old white female who had sinus node dysfunction and previously received a dual-chamber pacemaker in .  Since then, the patient has been known to have atrial fibrillation.  She is currently on digoxin 0.125 mg once a day and carvedilol 6.25 mg p.o. b.i.d.  The patient recently presented to the ER with nose bleeding, and she appeared to have a fast ventricular rate which was also confirmed on pacemaker interrogation.  Her nose bleeding has stopped, and she is on warfarin without rebleeding.  She does not feel palpitation and denies chest pain or shortness of breath.      Her past medical history is quite complicated with severe obesity, history of lung cancer, chronic renal insufficiency, hyperparathyroidism, history of moderate aortic stenosis, obstructive sleep apnea, COPD.      PHYSICAL EXAMINATION:    VITAL SIGNS:  On examination, blood pressure was 112/68, heart rate 82 beats per minute.    GENERAL:  She uses a scooter to move around.   LUNGS:  Clear.   CARDIAC:  The cardiac rhythm was irregularly irregular.  The heart sounds were remote.  There was no murmur.   ABDOMEN:  Showed severe obesity.   EXTREMITIES:  There was no pedal edema.      EKG showed atrial fibrillation with average ventricular rate 74 beats per minute and intermittent ventricular pacing.      ASSESSMENT AND RECOMMENDATIONS:  Ms. Lan is a 76-year-old white female status post dual-chamber pacemaker implant for sinus node dysfunction.  She is now in persistent atrial fibrillation.  Her heart rate is controlled during today's clinic visit, and the patient has no apparent symptoms at the present time.  At this point, I would like to  change the dose of her carvedilol from 6.25 to 12.5 mg p.o. b.i.d.  If she continues to do well, she would not need AV node ablation.  If there is any suspicion of rapid ventricular rate, we will either reinterrogate the pacemaker or have her wear a 24-hour Holter monitor.  Otherwise, she will continue Cardiology followup with Dr. Shine.      Sincerely,            MD VIANEY Cote MD             D: 2017 14:02   T: 2017 22:43   MT: RICHI      Name:     HEATHER PRINCE   MRN:      [id number]        Account:      [id number]   :      1940           Service Date: 2017      Document: [id number]

## 2017-01-31 ENCOUNTER — ANTICOAGULATION THERAPY VISIT (OUTPATIENT)
Dept: NURSING | Facility: CLINIC | Age: 77
End: 2017-01-31
Payer: MEDICARE

## 2017-01-31 ENCOUNTER — TELEPHONE (OUTPATIENT)
Dept: FAMILY MEDICINE | Facility: CLINIC | Age: 77
End: 2017-01-31

## 2017-01-31 DIAGNOSIS — Z79.01 LONG-TERM (CURRENT) USE OF ANTICOAGULANTS: Primary | ICD-10-CM

## 2017-01-31 DIAGNOSIS — I48.91 ATRIAL FIBRILLATION, UNSPECIFIED TYPE (H): ICD-10-CM

## 2017-01-31 LAB — INR PPP: 2.2

## 2017-01-31 PROCEDURE — 99207 ZZC NO CHARGE NURSE ONLY: CPT | Performed by: INTERNAL MEDICINE

## 2017-01-31 NOTE — TELEPHONE ENCOUNTER
I called and left  for patient letting her know that 2/20/17 for recheck date is fine and I updated the anticoagulation encounter.    Britt Jacobo RN

## 2017-01-31 NOTE — TELEPHONE ENCOUNTER
Reason for call: INR  Patient called regarding (reason for call): Pt says she got the approval to go three weeks in between checking her INR so she will come back on 2/20.   Additional comments: N/A    Phone Number Pt can be reached at: Home number on file [phone] (home)  Best Time: anytime, if needed  Can we leave a detailed message on this number? YES

## 2017-01-31 NOTE — PROGRESS NOTES
ANTICOAGULATION FOLLOW-UP CLINIC VISIT    Patient Name:  Malgorzata Lan  Date:  1/31/2017  Contact Type:  Telephone/ left detailed VM for Malgorzata with dosing instructions.    SUBJECTIVE:        OBJECTIVE    INR   Date Value Ref Range Status   01/31/2017 2.2  Final       ASSESSMENT / PLAN  INR assessment THER    Recheck INR In: 2 WEEKS    INR Location Home INR      Anticoagulation Summary as of 1/31/2017     INR goal 2.0-3.0   Selected INR 2.2 (1/31/2017)   Maintenance plan 5 mg (2.5 mg x 2) every day   Full instructions 5 mg every day   Weekly total 35 mg   No change documented Britt Jacobo, RN   Plan last modified Bharati Saini RN (10/4/2016)   Next INR check 2/14/2017   Priority INR   Target end date     Indications   Long-term (current) use of anticoagulants [Z79.01] [Z79.01]  Atrial fibrillation (H) [I48.91]         Anticoagulation Episode Summary     INR check location Home Draw    Preferred lab     Send INR reminders to CS ANTICOAGULATION    Comments ALERE Home INR      Anticoagulation Care Providers     Provider Role Specialty Phone number    Reed, Michelle Crow MD [facility] [phone]            See the Encounter Report to view Anticoagulation Flowsheet and Dosing Calendar (Go to Encounters tab in chart review, and find the Anticoagulation Therapy Visit)    Dosage adjustment made based on physician directed care plan.  Left detailed VM for Malgorzata with dosing instructions.   Recheck in 2 weeks.    Britt Jacobo, RN

## 2017-02-16 ENCOUNTER — TELEPHONE (OUTPATIENT)
Dept: FAMILY MEDICINE | Facility: CLINIC | Age: 77
End: 2017-02-16

## 2017-02-16 DIAGNOSIS — I50.9 CHF (CONGESTIVE HEART FAILURE) (H): Primary | ICD-10-CM

## 2017-02-16 RX ORDER — TORSEMIDE 10 MG/1
20 TABLET ORAL AT BEDTIME
Qty: 180 TABLET | Refills: 3 | Status: SHIPPED | OUTPATIENT
Start: 2017-02-16

## 2017-02-16 NOTE — TELEPHONE ENCOUNTER
I called patient on this first. She got in touch with her endocrinologist and they were able to straighten the issue out with insurance on this one. No further action needed at this point.    Ciaran Hair, [facility]

## 2017-02-16 NOTE — TELEPHONE ENCOUNTER
Patient called to request refill for torsemide was last seen by Dr. Palmer on 1/27/17 no changes to medicaition.  Refill sent to requested pharmacy.

## 2017-02-20 LAB — INR PPP: 3.6

## 2017-02-21 ENCOUNTER — ANTICOAGULATION THERAPY VISIT (OUTPATIENT)
Dept: NURSING | Facility: CLINIC | Age: 77
End: 2017-02-21
Payer: MEDICARE

## 2017-02-21 ENCOUNTER — TELEPHONE (OUTPATIENT)
Dept: FAMILY MEDICINE | Facility: CLINIC | Age: 77
End: 2017-02-21

## 2017-02-21 DIAGNOSIS — I48.91 ATRIAL FIBRILLATION, UNSPECIFIED TYPE (H): ICD-10-CM

## 2017-02-21 DIAGNOSIS — Z79.01 LONG-TERM (CURRENT) USE OF ANTICOAGULANTS: ICD-10-CM

## 2017-02-21 PROCEDURE — 99207 ZZC NO CHARGE NURSE ONLY: CPT | Performed by: INTERNAL MEDICINE

## 2017-02-21 NOTE — PROGRESS NOTES
ANTICOAGULATION FOLLOW-UP CLINIC VISIT    Patient Name:  Malgorzata Lan  Date:  2/21/2017  Contact Type:  Telephone/ Spoke with patient and she reports that she took a fall last week. Possible higher INR due to inflammation.     SUBJECTIVE:     Patient Findings     Positives Inflammation    Comments Patient took a fall last week on hip. She has been laying flat in bed for longer than normal.            OBJECTIVE    INR   Date Value Ref Range Status   02/20/2017 3.6  Final       ASSESSMENT / PLAN  INR assessment SUPRA    Recheck INR In: 1 WEEK    INR Location Home INR      Anticoagulation Summary as of 2/21/2017     INR goal 2.0-3.0   Today's INR 3.6! (2/20/2017)   Maintenance plan 5 mg (2.5 mg x 2) every day   Full instructions 2/21: 2.5 mg; Otherwise 5 mg every day   Weekly total 35 mg   Plan last modified Bharati Saini RN (10/4/2016)   Next INR check 2/27/2017   Priority INR   Target end date     Indications   Long-term (current) use of anticoagulants [Z79.01] [Z79.01]  Atrial fibrillation (H) [I48.91]         Anticoagulation Episode Summary     INR check location Home Draw    Preferred lab     Send INR reminders to CS ANTICOAGULATION    Comments ALERE Home INR      Anticoagulation Care Providers     Provider Role Specialty Phone number    Derek Michelle Crow MD [facility] [phone]            See the Encounter Report to view Anticoagulation Flowsheet and Dosing Calendar (Go to Encounters tab in chart review, and find the Anticoagulation Therapy Visit)    Dosage adjustment made based on physician directed care plan.  Patient took a fall last week and is equating the higher INR to inflammation from fall.   Will have patient do 2.5 mg tonight and then resume normal dose.   Recheck in 1 week.       Britt Jacobo RN

## 2017-02-21 NOTE — MR AVS SNAPSHOT
Malgorzata Barrazalee ann   2/21/2017   Anticoagulation Therapy Visit    Description:  76 year old female   Provider:  Clair Michelle DO   Department:  [facility] Nurse           INR as of 2/21/2017     Today's INR 3.6! (2/20/2017)      Anticoagulation Summary as of 2/21/2017     INR goal 2.0-3.0   Today's INR 3.6! (2/20/2017)   Full instructions 2/21: 2.5 mg; Otherwise 5 mg every day   Next INR check 2/27/2017    Indications   Long-term (current) use of anticoagulants [Z79.01] [Z79.01]  Atrial fibrillation (H) [I48.91]         Contact Numbers     Clinic Number:         February 2017 Details    Sun Mon Tue Wed Thu Fri Sat        1               2               3               4                 5               6               7               8               9               10               11                 12               13               14               15               16               17               18                 19               20               21      2.5 mg   See details      22      5 mg         23      5 mg         24      5 mg         25      5 mg           26      5 mg         27            28                    Date Details   02/21 This INR check       Date of next INR:  2/27/2017         How to take your warfarin dose     To take:  2.5 mg Take 1 of the 2.5 mg tablets.    To take:  5 mg Take 2 of the 2.5 mg tablets.

## 2017-02-21 NOTE — TELEPHONE ENCOUNTER
Left VM for patient to call clinic back and speak with INR RN.  We received Alere INR result from 2/20/17 and was 3.6.  I am wondering if there has been any changes to dose, diet, illnesses, medications that could be causing higher INR.  See Anticoagulation encounter from 2/21/17.     Britt Jacobo RN

## 2017-02-24 ENCOUNTER — TELEPHONE (OUTPATIENT)
Dept: FAMILY MEDICINE | Facility: CLINIC | Age: 77
End: 2017-02-24

## 2017-02-24 NOTE — TELEPHONE ENCOUNTER
Dr Cordova received a form from [address] regarding TENS unit for this patient. I called the patient and she reports that she did not request any TENS unit or any other kind of medical supply item of the sort. She also does not know of any provider who may have requested this. Patient is therefore asking that we ignore this request from [address]    Ciaran Hair [facility]

## 2017-02-28 ENCOUNTER — ANTICOAGULATION THERAPY VISIT (OUTPATIENT)
Dept: NURSING | Facility: CLINIC | Age: 77
End: 2017-02-28
Payer: MEDICARE

## 2017-02-28 DIAGNOSIS — I48.91 ATRIAL FIBRILLATION, UNSPECIFIED TYPE (H): ICD-10-CM

## 2017-02-28 DIAGNOSIS — Z79.01 LONG-TERM (CURRENT) USE OF ANTICOAGULANTS: ICD-10-CM

## 2017-02-28 LAB — INR PPP: 2.3

## 2017-02-28 PROCEDURE — 99207 ZZC NO CHARGE NURSE ONLY: CPT | Performed by: INTERNAL MEDICINE

## 2017-02-28 NOTE — PROGRESS NOTES
ANTICOAGULATION FOLLOW-UP CLINIC VISIT    Patient Name:  Malgorzata Lan  Date:  2/28/2017  Contact Type:  Telephone/ Spoke with patient and provided dosing instructions.     SUBJECTIVE:        OBJECTIVE    INR   Date Value Ref Range Status   02/28/2017 2.3  Final       ASSESSMENT / PLAN  INR assessment THER    Recheck INR In: 2 WEEKS    INR Location Home INR      Anticoagulation Summary as of 2/28/2017     INR goal 2.0-3.0   Today's INR 2.3   Maintenance plan 5 mg (2.5 mg x 2) every day   Full instructions 5 mg every day   Weekly total 35 mg   No change documented Britt Jacobo RN   Plan last modified Bharati Saini RN (10/4/2016)   Next INR check 3/14/2017   Priority INR   Target end date     Indications   Long-term (current) use of anticoagulants [Z79.01] [Z79.01]  Atrial fibrillation (H) [I48.91]         Anticoagulation Episode Summary     INR check location Home Draw    Preferred lab     Send INR reminders to CS ANTICOAGULATION    Comments ALERE Home INR      Anticoagulation Care Providers     Provider Role Specialty Phone number    Derek Michelle Crow MD [facility] [phone]            See the Encounter Report to view Anticoagulation Flowsheet and Dosing Calendar (Go to Encounters tab in chart review, and find the Anticoagulation Therapy Visit)    Dosage adjustment made based on physician directed care plan.  Spoke with patient on the phone and provided dosing instructions. Return back to normal dose following fall which may have caused INR to increase slightly last week.     Britt Jacobo, RN

## 2017-02-28 NOTE — MR AVS SNAPSHOT
Malgorzata Barrazalee ann   2/28/2017   Anticoagulation Therapy Visit    Description:  76 year old female   Provider:  Clair Michelle DO   Department:  [facility] Nurse           INR as of 2/28/2017     Today's INR 2.3      Anticoagulation Summary as of 2/28/2017     INR goal 2.0-3.0   Today's INR 2.3   Full instructions 5 mg every day   Next INR check 3/14/2017    Indications   Long-term (current) use of anticoagulants [Z79.01] [Z79.01]  Atrial fibrillation (H) [I48.91]         Contact Numbers     Clinic Number:         February 2017 Details    Sun Mon Tue Wed Thu Fri Sat        1               2               3               4                 5               6               7               8               9               10               11                 12               13               14               15               16               17               18                 19               20               21               22               23               24               25                 26               27               28      5 mg   See details           Date Details   02/28 This INR check               How to take your warfarin dose     To take:  5 mg Take 2 of the 2.5 mg tablets.           March 2017 Details    Sun Mon Tue Wed Thu Fri Sat        1      5 mg         2      5 mg         3      5 mg         4      5 mg           5      5 mg         6      5 mg         7      5 mg         8      5 mg         9      5 mg         10      5 mg         11      5 mg           12      5 mg         13      5 mg         14            15               16               17               18                 19               20               21               22               23               24               25                 26               27               28               29               30               31                 Date Details   No additional details    Date of next INR:  3/14/2017         How to take your  warfarin dose     To take:  5 mg Take 2 of the 2.5 mg tablets.

## 2017-03-01 ENCOUNTER — TELEPHONE (OUTPATIENT)
Dept: FAMILY MEDICINE | Facility: CLINIC | Age: 77
End: 2017-03-01

## 2017-03-01 DIAGNOSIS — Z79.899 POLYPHARMACY: Primary | ICD-10-CM

## 2017-03-02 NOTE — TELEPHONE ENCOUNTER
Patient returned call, transferred to nurse.  Patient states she spoke with a male from clinic last week regarding request for TENS.  See telephone note dated 2/24/17.  Patient does state she has never even heard of this company and is also not followed by PT, has not been for about 2 years.  Patient is not needing TENS.  Patient was inquiring about xray of foot completed on October, states she did not ever receive results.  Reviewed with her that a letter was sent out, she states she did not receive.  Reviewed letter with her and offered to resend, she would appreciate.  Patient also requesting to meet with someone to go through all of her medications, states she has so many.  Reviewed MTM visit with her, she is interested.  Order pended. Please sign if ok and correct order.  Patient had no further questions.  F/u appointment also scheduled.  Brenda Mendoza RN

## 2017-03-02 NOTE — TELEPHONE ENCOUNTER
Received form from  [employer] asking me to fill out a form re: TENS unit. Did she request this? Did she have physical therapy eval? There are a lot of specific questions so may be best that she works with physical therapy if she hasn't already in regards to filling this out. As a reference, I have it sitting on top of my desk.  Thanks!

## 2017-03-06 ENCOUNTER — TELEPHONE (OUTPATIENT)
Dept: PHARMACY | Facility: OTHER | Age: 77
End: 2017-03-06

## 2017-03-06 NOTE — TELEPHONE ENCOUNTER
MTM referral from: [facility] visit (referral by provider)    MTM referral outreach attempt #1 on March 6, 2017 at 1:18 PM      Outcome: Patient is not interested at this time because insurance does not cover MTM and she is not ACO for 2017, will route to MTM Pharmacist/Provider as an FYI. Thank you for the referral.     Nunu Cabezas, MTM Coordinator

## 2017-03-08 ENCOUNTER — ALLIED HEALTH/NURSE VISIT (OUTPATIENT)
Dept: CARDIOLOGY | Facility: CLINIC | Age: 77
End: 2017-03-08
Payer: MEDICARE

## 2017-03-08 DIAGNOSIS — I50.9 CONGESTIVE HEART FAILURE, UNSPECIFIED CONGESTIVE HEART FAILURE CHRONICITY, UNSPECIFIED CONGESTIVE HEART FAILURE TYPE: ICD-10-CM

## 2017-03-08 DIAGNOSIS — I73.9 PERIPHERAL VASCULAR DISEASE (H): ICD-10-CM

## 2017-03-08 DIAGNOSIS — R32 UNSPECIFIED URINARY INCONTINENCE: ICD-10-CM

## 2017-03-08 DIAGNOSIS — M81.0 OSTEOPOROSIS: ICD-10-CM

## 2017-03-08 DIAGNOSIS — I42.9 CARDIOMYOPATHY (H): ICD-10-CM

## 2017-03-08 DIAGNOSIS — Z95.0 CARDIAC PACEMAKER IN SITU: Primary | ICD-10-CM

## 2017-03-08 PROCEDURE — 93294 REM INTERROG EVL PM/LDLS PM: CPT | Performed by: INTERNAL MEDICINE

## 2017-03-08 PROCEDURE — 93296 REM INTERROG EVL PM/IDS: CPT | Performed by: INTERNAL MEDICINE

## 2017-03-08 NOTE — TELEPHONE ENCOUNTER
Potassium chl, lisinopril      Last Written Prescription Date: 08/26/16  Last Fill Quantity: 90/180, # refills: 1/3  Last Office Visit with [facility], [facility] or  Health prescribing provider: 10/18/16  Next 5 appointments (look out 90 days)     Apr 11, 2017  3:00 PM CDT   Office Visit with Clair Michelle,    [facility]    [address] [facility] [address]   [phone]                   Potassium   Date Value Ref Range Status   06/08/2016 3.7 3.4 - 5.3 mmol/L Final     Creatinine   Date Value Ref Range Status   06/15/2016 1.25 mg/dL Final     BP Readings from Last 3 Encounters:   01/25/17 112/68   10/18/16 121/68   09/14/16 122/64     trental           Last Written Prescription Date: 08/26/16  Last Fill Quantity: 90, # refills: 3    Last Office Visit with [facility], [facility] or  Health prescribing provider:  10/18/16   Future Office Visit:    Next 5 appointments (look out 90 days)     Apr 11, 2017  3:00 PM CDT   Office Visit with Clair Michelle DO   [facility]    [address] [facility] [address]   [phone]                   Lab Results   Component Value Date    WBC 1.9 04/22/2016     Lab Results   Component Value Date    RBC 3.44 04/22/2016     Lab Results   Component Value Date    HGB 10.3 04/22/2016     Lab Results   Component Value Date    HCT 30.6 04/22/2016     No components found for: MCT  Lab Results   Component Value Date    MCV 89 04/22/2016     Lab Results   Component Value Date    MCH 29.9 04/22/2016     Lab Results   Component Value Date    MCHC 33.7 04/22/2016     Lab Results   Component Value Date    RDW 15.0 04/22/2016     Lab Results   Component Value Date    PLT 88 04/22/2016     Lab Results   Component Value Date    AST 19 06/15/2016     Lab Results   Component Value Date    ALT 20 04/17/2016     Creatinine   Date Value Ref Range Status   06/15/2016 1.25 mg/dL Final   ]  ditropan      Last Written Prescription  Date: 08/26/16  Last Fill Quantity: 90,  # refills: 3   Last Office Visit with [facility], [facility] or [facility] prescribing provider: 10/18/16                                         Next 5 appointments (look out 90 days)     Apr 11, 2017  3:00 PM CDT   Office Visit with Clair Michelle DO   [facility]    [address] [facility] [address]   [phone]

## 2017-03-08 NOTE — PROGRESS NOTES
[employer]avantio (S) Remote PPM Device Check  : 13 %, AFIB, taking Jantoven  Mode: VVI        Presenting Rhythm:  with some VS events  Heart Rate: Rates 50-160bpm per histogram  Sensing: Stable    Pacing Threshold: Stable    Impedance: Stable  Battery Status: 7 years  Atrial Arrhythmia: N/A  Ventricular Arrhythmia: 444 ventricular high rates. 11 EGMs available show irregular VS events for RVR lasting 4 seconds to 3 minutes, average rates 170-230bpm. Reviewed findings with MORRIS Busch.       Care Plan: F/u PPM Latitude q 3 months. Gave patient results over the phone. Trudy,CVT

## 2017-03-08 NOTE — TELEPHONE ENCOUNTER
Reason for Call:  Medication or medication refill:    Do you use a [facility]?  Name of the pharmacy and phone number for the current request:     [facility] [address] [place] [address] [address] AT [facility] [address]        Name of the medication requested: potassium chloride SA (K-DUR,KLOR-CON M) 20 MEQ tablet  pentoxifylline (TRENTAL) 400 MG CR tablet  oxybutynin (DITROPAN) 5 MG tablet  lisinopril (PRINIVIL,ZESTRIL) 5 MG tablet    Other request: pt is requesting 3 month supply on all   Re sending encounter bc previous encounter was sent to Dr. Reed    Can we leave a detailed message on this number? YES    Phone number patient can be reached at: Home number on file [phone] (home)    Best Time: anytime    Call taken on 3/8/2017 at 3:41 PM by Vanessa Núñez

## 2017-03-08 NOTE — MR AVS SNAPSHOT
After Visit Summary   3/8/2017    Malgorzata Lan    MRN: [id number]           Patient Information     Date Of Birth          1940        Visit Information        Provider Department      3/8/2017 3:00 PM BUCIO TECH1 [facility] [place]        Today's Diagnoses     Cardiac pacemaker in situ    -  1       Follow-ups after your visit        Your next 10 appointments already scheduled     Mar 08, 2017  3:00 PM CST   Remote PPM Check with BUCIO TECH1   [facility] ([facility])    [address] [address] [phone]           This appointment is for a remote check of your pacemaker.  This is not an appointment at the office.            Apr 11, 2017  3:00 PM CDT   Office Visit with Clair Michelle,    [facility] ([facility]    [address] [facility] [address] [phone]           Bring a current list of meds and any records pertaining to this visit.  For Physicals, please bring immunization records and any forms needing to be filled out.  Please arrive 10 minutes early to complete paperwork.              If Something Goes Wrong     I will check my blood sugars twice a day     Notes - Note created  7/17/2014 10:25 AM by Jeannine Baker RN    As of today's date 7/17/2014 goal is met at 76 - 100%.   Goal Status:  Active        Who to contact     If you have questions or need follow up information about today's clinic visit or your schedule please contact [facility] directly at [phone].  Normal or non-critical lab and imaging results will be communicated to you by MyChart, letter or phone within 4 business days after the clinic has received the results. If you do not hear from us within 7 days, please contact the clinic through MyChart or phone. If you have a critical or abnormal lab result, we will notify you  "by phone as soon as possible.  Submit refill requests through [employer] or call your pharmacy and they will forward the refill request to us. Please allow 3 business days for your refill to be completed.          Additional Information About Your Visit        [email or web address] Information     [employer] lets you send messages to your doctor, view your test results, renew your prescriptions, schedule appointments and more. To sign up, go to [email or web address] . Click on \"Log in\" on the left side of the screen, which will take you to the Welcome page. Then click on \"Sign up Now\" on the right side of the page.     You will be asked to enter the access code listed below, as well as some personal information. Please follow the directions to create your username and password.     Your access code is: [email or web address]  Expires: 2017  1:57 PM     Your access code will  in 90 days. If you need help or a new code, please call your [facility] clinic or [phone].        Care EveryWhere ID     This is your Care EveryWhere ID. This could be used by other organizations to access your [facility] medical records  [id number]        Your Vitals Were     Last Period                   (LMP Unknown)            Blood Pressure from Last 3 Encounters:   17 112/68   10/18/16 121/68   16 122/64    Weight from Last 3 Encounters:   10/18/16 129.7 kg (286 lb)   16 127.9 kg (282 lb)   16 127.8 kg (281 lb 12.8 oz)              We Performed the Following     INTERROGATION DEVICE EVAL REMOTE, PACER/ICD (25063)     PM DEVICE INTERROGATE REMOTE (72477)        Primary Care Provider Office Phone # Fax #    Clair Michelle,  [phone]       [facility] [place] [address]  [place] [address]        Goals        General    I will call my PCP right away if I have increased shortness of breath.   (pt-stated)     Notes - Note created  2014 10:27 AM by Jeannine Baker RN    As of today's date 2014 " goal is met at 51 - 75%.   Goal Status:  Active      I will weigh myself daily and records results (pt-stated)     Notes - Note created  7/17/2014 10:25 AM by Jeannine Baker RN    As of today's date 7/17/2014 goal is met at 76 - 100%.   Goal Status:  Active        Thank you!     Thank you for choosing [facility] [place]  for your care. Our goal is always to provide you with excellent care. Hearing back from our patients is one way we can continue to improve our services. Please take a few minutes to complete the written survey that you may receive in the mail after your visit with us. Thank you!             Your Updated Medication List - Protect others around you: Learn how to safely use, store and throw away your medicines at www.disposemymeds.org.          This list is accurate as of: 3/8/17 10:39 AM.  Always use your most recent med list.                   Brand Name Dispense Instructions for use    acetaminophen 650 MG 8 hour tablet     100 tablet    Take 650 mg by mouth every 6 hours as needed for mild pain       albuterol (5 MG/ML) 0.5% neb solution    PROVENTIL    60 vial    Take 0.5 mLs (2.5 mg) by nebulization every 4 hours as needed for wheezing or shortness of breath / dyspnea (dyspnea)       alum & mag hydroxide-simethicone 400-400-40 MG/5ML Susp suspension    MYLANTA ES/MAALOX  ES     Take 15-30 mLs by mouth every 4 hours as needed for indigestion       ASPIRIN NOT PRESCRIBED    INTENTIONAL    0 each    Antiplatelet medication not prescribed intentionally due to Current anticoagulant therapy (warfarin/enoxaparin)       atorvastatin 40 MG tablet    LIPITOR    90 tablet    Take 1 tablet (40 mg) by mouth At Bedtime       betamethasone valerate 0.1 % ointment    VALISONE    15 g    Apply topically daily To inside of ears.       blood glucose monitoring test strip    no brand specified    3 Month    Use to test blood sugars 3 -4  times daily or as directed       budesonide  0.5 MG/2ML neb solution    PULMICORT    60 ampule    Take 2 mLs (0.5 mg) by nebulization 2 times daily as needed       calcitonin (salmon) 200 UNIT/ACT nasal spray    MIACALCIN    3.7 mL    Spray 1 spray into one nostril alternating nostrils daily Alternate nostril each day. Right Nostril on Even Days Left Nostril on Odd Days       carvedilol 6.25 MG tablet    COREG    180 tablet    Take 2 tablets (12.5 mg) by mouth 2 times daily (with meals)       CINNAMON PO      Take 1,000 mg by mouth daily       cod liver oil Caps capsule      Take 1 capsule by mouth daily       digoxin 125 MCG tablet    LANOXIN    30 tablet    Take 1 tablet (125 mcg) by mouth daily       fluticasone 50 MCG/ACT spray    FLONASE    3 Bottle    Spray 1-2 sprays into both nostrils daily       latanoprost 0.005 % ophthalmic solution    XALATAN     Place 1 drop into both eyes At Bedtime.       lisinopril 5 MG tablet    PRINIVIL/ZESTRIL    90 tablet    Take 1 tablet (5 mg) by mouth daily       meclizine 25 MG tablet    ANTIVERT    180 tablet    Take 1 tablet (25 mg) by mouth 3 times daily as needed for dizziness       megestrol 40 MG tablet    MEGACE         nitroglycerin 0.4 MG sublingual tablet    NITROSTAT    30 tablet    If chest pain place under tongue call 911 if pain not resolved/ repeat every 5 min for total of 3 doses       NovoLIN MIX 70/30 VIAL injection   Generic drug:  insulin NPH-insulin regular     10 mL    Inject 30 Units Subcutaneous 2 times daily (with meals)       oxybutynin 5 MG tablet    DITROPAN    180 tablet    Take 1 tablet (5 mg) by mouth 2 times daily       pentoxifylline 400 MG CR tablet    TRENtal    90 tablet    Take 1 tablet (400 mg) by mouth 3 times daily (with meals)       polyethylene glycol Packet    MIRALAX/GLYCOLAX     Take 1 packet by mouth 2 times daily as needed for constipation       potassium chloride SA 20 MEQ CR tablet    K-DUR/KLOR-CON M    180 tablet    Take 1 tablet (20 mEq) by mouth 2 times daily        saccharomyces boulardii 250 MG capsule    FLORASTOR    14 capsule    Take 1 capsule (250 mg) by mouth 2 times daily       sennosides 8.6 MG tablet    SENOKOT     Take 1-4 tablets by mouth daily as needed for constipation       torsemide 10 MG tablet    DEMADEX    180 tablet    Take 2 tablets (20 mg) by mouth At Bedtime       traMADol 50 MG tablet    ULTRAM    240 tablet    TAKE 1-2 TABLET BY MOUTH EVERY 6 HOURS AS NEEDED FOR PAIN       Vitamin B 12 100 MCG Lozg     100 lozenge    Take 1,000 mcg by mouth daily       vitamin D3 2000 UNITS Caps     90 capsule    Take 2,000 Units by mouth daily       * Warfarin Therapy Reminder      1 each continuous prn       * JANTOVEN 2.5 MG tablet   Generic drug:  warfarin     100 tablet    TK 3 TO 4 TS PO QD OR UTD BY INR CLINIC       * Notice:  This list has 2 medication(s) that are the same as other medications prescribed for you. Read the directions carefully, and ask your doctor or other care provider to review them with you.

## 2017-03-10 DIAGNOSIS — I73.9 PERIPHERAL VASCULAR DISEASE (H): ICD-10-CM

## 2017-03-10 RX ORDER — PENTOXIFYLLINE 400 MG/1
TABLET, EXTENDED RELEASE ORAL
Qty: 270 TABLET | Refills: 0 | OUTPATIENT
Start: 2017-03-10

## 2017-03-10 RX ORDER — PENTOXIFYLLINE 400 MG/1
400 TABLET, EXTENDED RELEASE ORAL
Qty: 90 TABLET | Refills: 0 | Status: SHIPPED | OUTPATIENT
Start: 2017-03-10 | End: 2017-05-11

## 2017-03-10 RX ORDER — POTASSIUM CHLORIDE 1500 MG/1
20 TABLET, EXTENDED RELEASE ORAL 2 TIMES DAILY
Qty: 180 TABLET | Refills: 0 | Status: SHIPPED | OUTPATIENT
Start: 2017-03-10 | End: 2017-08-09

## 2017-03-10 RX ORDER — LISINOPRIL 5 MG/1
5 TABLET ORAL DAILY
Qty: 90 TABLET | Refills: 0 | Status: SHIPPED | OUTPATIENT
Start: 2017-03-10 | End: 2017-08-07

## 2017-03-10 RX ORDER — OXYBUTYNIN CHLORIDE 5 MG/1
5 TABLET ORAL 2 TIMES DAILY
Qty: 180 TABLET | Refills: 0 | Status: SHIPPED | OUTPATIENT
Start: 2017-03-10 | End: 2017-04-11

## 2017-03-10 NOTE — TELEPHONE ENCOUNTER
Prescription approved per [facility].  Brenda Mendoza RN

## 2017-03-10 NOTE — TELEPHONE ENCOUNTER
pentoxifylline (TRENTAL) 400 MG CR tablet           Last Written Prescription Date: 3/10/207  Last Fill Quantity: 90, # refills: 0    Last Office Visit with [facility], [facility] or [facility] prescribing provider:  10/18/2016   Future Office Visit:    Next 5 appointments (look out 90 days)     Apr 11, 2017  3:00 PM CDT   Office Visit with Clair Michelle, DO   [facility] ([facility])    [address] [facility] [address]   [phone]                   Lab Results   Component Value Date    WBC 1.9 04/22/2016     Lab Results   Component Value Date    RBC 3.44 04/22/2016     Lab Results   Component Value Date    HGB 10.3 04/22/2016     Lab Results   Component Value Date    HCT 30.6 04/22/2016     No components found for: MCT  Lab Results   Component Value Date    MCV 89 04/22/2016     Lab Results   Component Value Date    MCH 29.9 04/22/2016     Lab Results   Component Value Date    MCHC 33.7 04/22/2016     Lab Results   Component Value Date    RDW 15.0 04/22/2016     Lab Results   Component Value Date    PLT 88 04/22/2016     Lab Results   Component Value Date    AST 19 06/15/2016     Lab Results   Component Value Date    ALT 20 04/17/2016     Creatinine   Date Value Ref Range Status   06/15/2016 1.25 mg/dL Final   ]

## 2017-03-13 RX ORDER — OXYBUTYNIN CHLORIDE 5 MG/1
TABLET ORAL
Qty: 180 TABLET | Refills: 0 | OUTPATIENT
Start: 2017-03-13

## 2017-03-13 RX ORDER — PENTOXIFYLLINE 400 MG/1
TABLET, EXTENDED RELEASE ORAL
Qty: 90 TABLET | Refills: 0 | OUTPATIENT
Start: 2017-03-13

## 2017-03-13 NOTE — TELEPHONE ENCOUNTER
I do not know how to order a 90 day supply of this Calcitonin spray.  The other two were already ordered last Friday.    Please order what is appropriate.  Thank you,    Olive Reyes RN

## 2017-03-13 NOTE — TELEPHONE ENCOUNTER
oxybutynin (DITROPAN) 5 MG tablet 180 tablet 0 3/10/2017          Last Written Prescription Date: 03/10/2017  Last Fill Quantity: 180,  # refills: 0   Last Office Visit with [facility] or  Health prescribing provider: 10/18/2016                                         Next 5 appointments (look out 90 days)     Apr 11, 2017  3:00 PM CDT   Office Visit with Clair Michelle DO   [facility] ([facility])    [address] [facility] [address]   [phone]                  pentoxifylline (TRENTAL) 400 MG CR tablet 90 tablet 0 3/10/2017               Last Written Prescription Date: 03/10/2017  Last Fill Quantity: 90, # refills: 0    Last Office Visit with [facility] or  Health prescribing provider:  10/18/2016   Future Office Visit:    Next 5 appointments (look out 90 days)     Apr 11, 2017  3:00 PM CDT   Office Visit with Clair Michelle DO   [facility] ([facility]    [address] [facility] [address]   [phone]                   Lab Results   Component Value Date    WBC 1.9 04/22/2016     Lab Results   Component Value Date    RBC 3.44 04/22/2016     Lab Results   Component Value Date    HGB 10.3 04/22/2016     Lab Results   Component Value Date    HCT 30.6 04/22/2016     No components found for: MCT  Lab Results   Component Value Date    MCV 89 04/22/2016     Lab Results   Component Value Date    MCH 29.9 04/22/2016     Lab Results   Component Value Date    MCHC 33.7 04/22/2016     Lab Results   Component Value Date    RDW 15.0 04/22/2016     Lab Results   Component Value Date    PLT 88 04/22/2016     Lab Results   Component Value Date    AST 19 06/15/2016     Lab Results   Component Value Date    ALT 20 04/17/2016     Creatinine   Date Value Ref Range Status   06/15/2016 1.25 mg/dL Final   ]

## 2017-03-14 LAB — INR PPP: 2.2

## 2017-03-14 NOTE — TELEPHONE ENCOUNTER
"Pended 3 month supply.  Sorry for the \"back and forth\" messages. Are you willing to be the authorizing prescriber on this. (formerly, Derek signed)  Thank you,  Marifer Zaragoza RN    "

## 2017-03-14 NOTE — TELEPHONE ENCOUNTER
I think the Calcitonin nasal spray is to be Rx by her endocrinologist Dr. Melara. Please clarify. Thanks!

## 2017-03-14 NOTE — TELEPHONE ENCOUNTER
I called Sabrina, Dr. Reed was the last prescriber  I called patient she said calcitonin has always been prescribed by Dr. Reed.  She said she  got all 3 meds including the  Calitonin too.  She said Dr. Reed's name was still on that one.     PRINCESS Redmond

## 2017-03-15 ENCOUNTER — ANTICOAGULATION THERAPY VISIT (OUTPATIENT)
Dept: NURSING | Facility: CLINIC | Age: 77
End: 2017-03-15
Payer: MEDICARE

## 2017-03-15 DIAGNOSIS — I48.91 ATRIAL FIBRILLATION, UNSPECIFIED TYPE (H): ICD-10-CM

## 2017-03-15 DIAGNOSIS — Z79.01 LONG-TERM (CURRENT) USE OF ANTICOAGULANTS: ICD-10-CM

## 2017-03-15 PROCEDURE — 99207 ZZC NO CHARGE NURSE ONLY: CPT | Performed by: INTERNAL MEDICINE

## 2017-03-15 RX ORDER — CALCITONIN SALMON 200 [IU]/.09ML
SPRAY, METERED NASAL
Qty: 11.1 ML | Refills: 3 | Status: SHIPPED | OUTPATIENT
Start: 2017-03-15 | End: 2017-08-09

## 2017-03-15 NOTE — PROGRESS NOTES
ANTICOAGULATION FOLLOW-UP CLINIC VISIT    Patient Name:  Malgorzata Lan  Date:  3/15/2017  Contact Type:  Telephone/ Left detailed VM for patient with dosing instruction and recheck date.     SUBJECTIVE:        OBJECTIVE    INR   Date Value Ref Range Status   03/14/2017 2.2  Final       ASSESSMENT / PLAN  INR assessment THER    Recheck INR In: 2 WEEKS    INR Location Home INR      Anticoagulation Summary as of 3/15/2017     INR goal 2.0-3.0   Today's INR 2.2 (3/14/2017)   Maintenance plan 5 mg (2.5 mg x 2) every day   Full instructions 5 mg every day   Weekly total 35 mg   No change documented Britt Jacobo, MORRIS   Plan last modified Bharati Sanii RN (10/4/2016)   Next INR check 3/28/2017   Priority INR   Target end date     Indications   Long-term (current) use of anticoagulants [Z79.01] [Z79.01]  Atrial fibrillation (H) [I48.91]         Anticoagulation Episode Summary     INR check location Home Draw    Preferred lab     Send INR reminders to [facility] ANTICOAGULATION    Comments AL[facility] Home INR      Anticoagulation Care Providers     Provider Role Specialty Phone number    ReedMichelle MD [facility] [phone]            See the Encounter Report to view Anticoagulation Flowsheet and Dosing Calendar (Go to Encounters tab in chart review, and find the Anticoagulation Therapy Visit)    Dosage adjustment made based on physician directed care plan.  Left detailed VM for patient with dosing instruction and next recheck date.     Britt Jacobo RN

## 2017-03-15 NOTE — MR AVS SNAPSHOT
Malgorzata Barrazalee ann   3/15/2017   Anticoagulation Therapy Visit    Description:  76 year old female   Provider:  Clair Michelle DO   Department:  [facility] Nurse           INR as of 3/15/2017     Today's INR 2.2 (3/14/2017)      Anticoagulation Summary as of 3/15/2017     INR goal 2.0-3.0   Today's INR 2.2 (3/14/2017)   Full instructions 5 mg every day   Next INR check 3/28/2017    Indications   Long-term (current) use of anticoagulants [Z79.01] [Z79.01]  Atrial fibrillation (H) [I48.91]         Contact Numbers     Clinic Number:         March 2017 Details    Sun Mon Tue Wed Thu Fri Sat        1               2               3               4                 5               6               7               8               9               10               11                 12               13               14               15      5 mg   See details      16      5 mg         17      5 mg         18      5 mg           19      5 mg         20      5 mg         21      5 mg         22      5 mg         23      5 mg         24      5 mg         25      5 mg           26      5 mg         27      5 mg         28            29               30               31                 Date Details   03/15 This INR check       Date of next INR:  3/28/2017         How to take your warfarin dose     To take:  5 mg Take 2 of the 2.5 mg tablets.

## 2017-03-16 DIAGNOSIS — I48.91 ATRIAL FIBRILLATION (H): ICD-10-CM

## 2017-03-16 RX ORDER — DIGOXIN 125 MCG
125 TABLET ORAL DAILY
Qty: 90 TABLET | Refills: 3 | Status: SHIPPED | OUTPATIENT
Start: 2017-03-16

## 2017-03-21 ENCOUNTER — TELEPHONE (OUTPATIENT)
Dept: FAMILY MEDICINE | Facility: CLINIC | Age: 77
End: 2017-03-21

## 2017-03-21 DIAGNOSIS — S81.802D WOUND OF LEFT LOWER EXTREMITY, SUBSEQUENT ENCOUNTER: Primary | ICD-10-CM

## 2017-03-21 NOTE — TELEPHONE ENCOUNTER
Reason for Call: Request for an order or referral:    Order or referral being requested: for Home Care to start again  Pt has an open sore on the left leg. Has it before and wants to get  An earlier start on it rather than wait. Would like a home care nurse  To come out every 2-3 days to wound care and keep an eye on it.  Last time she waited to long and ended up with a wound vac.    Date needed: as soon as possible. Pt would like a call to keep her  Updated on what is going on.  Has the patient been seen by the PCP for this problem? YES    Additional comments: current nurse that comes to visit saw it and   Told the pt to call.    Phone number Patient can be reached at:  Home number on file [phone] (home)    Best Time:  antyime    Can we leave a detailed message on this number?  YES    Call taken on 3/21/2017 at 2:44 PM by Luz Ford

## 2017-03-21 NOTE — TELEPHONE ENCOUNTER
I called and spoke with patient.   Patient reports of small open wound on left lower leg on outside of leg.   Started 3 weeks ago as a small hole  Size now: Kyle size  Drainage is milky colored and some blood mixed in.  Patient requesting Home Care to come out to house to do wound care for wound.   Will route to PCP for request of home care for wound.  Last office visit w/ Dr. Michelle was 10/18/16    Please advise.     Britt Jacobo RN

## 2017-03-22 NOTE — TELEPHONE ENCOUNTER
Spoke with patient.   Symptoms:  Nickel sized open wound left lower ext.  Noticed x 3 wks ago.  No known injury. Has been treating at home, however seems to be worsening.    Requesting HC.     Advised OV to assess. Patient stated understanding.   Patient scheduled with TEAM tomorrow.    Takes [employer].      Jen Lowry RN, BSN

## 2017-03-22 NOTE — TELEPHONE ENCOUNTER
Not sure home care orders can be placed since she hasn't been seen in clinic for this issue and last OV was 10/18/16  May need to see team to get home care started

## 2017-03-23 ENCOUNTER — OFFICE VISIT (OUTPATIENT)
Dept: FAMILY MEDICINE | Facility: CLINIC | Age: 77
End: 2017-03-23
Payer: MEDICARE

## 2017-03-23 VITALS
HEART RATE: 59 BPM | DIASTOLIC BLOOD PRESSURE: 78 MMHG | TEMPERATURE: 97.4 F | OXYGEN SATURATION: 96 % | WEIGHT: 286 LBS | BODY MASS INDEX: 47.65 KG/M2 | SYSTOLIC BLOOD PRESSURE: 128 MMHG | HEIGHT: 65 IN

## 2017-03-23 DIAGNOSIS — S81.802A OPEN WOUND OF LOWER LIMB, LEFT, INITIAL ENCOUNTER: Primary | ICD-10-CM

## 2017-03-23 PROCEDURE — 99213 OFFICE O/P EST LOW 20 MIN: CPT | Performed by: NURSE PRACTITIONER

## 2017-03-23 ASSESSMENT — ANXIETY QUESTIONNAIRES
6. BECOMING EASILY ANNOYED OR IRRITABLE: NOT AT ALL
3. WORRYING TOO MUCH ABOUT DIFFERENT THINGS: NOT AT ALL
2. NOT BEING ABLE TO STOP OR CONTROL WORRYING: NOT AT ALL
1. FEELING NERVOUS, ANXIOUS, OR ON EDGE: NOT AT ALL
5. BEING SO RESTLESS THAT IT IS HARD TO SIT STILL: NOT AT ALL
GAD7 TOTAL SCORE: 0
7. FEELING AFRAID AS IF SOMETHING AWFUL MIGHT HAPPEN: NOT AT ALL
IF YOU CHECKED OFF ANY PROBLEMS ON THIS QUESTIONNAIRE, HOW DIFFICULT HAVE THESE PROBLEMS MADE IT FOR YOU TO DO YOUR WORK, TAKE CARE OF THINGS AT HOME, OR GET ALONG WITH OTHER PEOPLE: NOT DIFFICULT AT ALL

## 2017-03-23 ASSESSMENT — PATIENT HEALTH QUESTIONNAIRE - PHQ9: 5. POOR APPETITE OR OVEREATING: NOT AT ALL

## 2017-03-23 NOTE — NURSING NOTE
"Chief Complaint   Patient presents with     Wound Check     Nickel sized open wound of left lower extremity.  Noticed x 3 wks ago       Initial /78 (BP Location: Right arm, Patient Position: Chair, Cuff Size: Adult Large)  Pulse 59  Temp 97.4  F (36.3  C) (Oral)  Ht 5' 5\" (1.651 m)  Wt 286 lb (129.7 kg)  LMP  (LMP Unknown)  SpO2 96%  BMI 47.59 kg/m2 Estimated body mass index is 47.59 kg/(m^2) as calculated from the following:    Height as of this encounter: 5' 5\" (1.651 m).    Weight as of this encounter: 286 lb (129.7 kg).  Medication Reconciliation: complete.      Winnie Kwok CMA      "

## 2017-03-23 NOTE — MR AVS SNAPSHOT
After Visit Summary   3/23/2017    Malgorzata Lan    MRN: [id number]           Patient Information     Date Of Birth          1940        Visit Information        Provider Department      3/23/2017 1:00 PM Sohail Gaston APRN [facility] [place]        Today's Diagnoses     Open wound of lower limb, left, initial encounter    -  1      Care Instructions    I have ordered home care for you today. You should get a call from them        Follow-ups after your visit        Additional Services     HOME CARE NURSING REFERRAL       **Order classes of: [facility], [facility] and [facility] will route to the Home Care and Hospice Referral Pool.  Home Care or Hospice will then contact the patient to schedule their appointment.**    If you do not hear from Home Care and Hospice, or you would like to call to schedule, please call the referring place of service that your provider has listed below.  ______________________________________________________________________    Your provider has referred you to: [facility]: [facility] [phone]   [email or web address]    Extended Emergency Contact Information  Primary Emergency Contact: ADRIA MARES   [facility]  Home Phone: [phone]  Work Phone: none  Mobile Phone: none  Relation: Friend    Patient Anticipated Discharge Date:    RN, PT, HHA to begin 24 - 48 hours after discharge.  PLEASE EVALUATE AND TREAT (Evaluation timeline is 24 - 48 hrs. Please call if there is need for a variance to this timeline).    REASON FOR REFERRAL: Wound Care - Specialty Treatment Orders: dressing changes    ADDITIONAL SERVICES NEEDED: none    OTHER PERTINENT INFORMATION: Patient was last seen by provider on 03/23/17 for open wound.    Current Outpatient Prescriptions:  digoxin (LANOXIN) 125 MCG tablet, Take 1 tablet (125 mcg) by mouth daily, Disp: 90 tablet, Rfl: 3  calcitonin, salmon,  (MIACALCIN) 200 UNIT/ACT nasal spray, SPRAY 1 SPRAY INTO ONE NOSTRIL DAILY ALTERNATE NOSTRIL EACH DAY, RIGHT NOSTRIL ON EVEN DAYS AND LEFT NOSTRIL ON ODD DAYS, Disp: 11.1 mL, Rfl: 3  lisinopril (PRINIVIL/ZESTRIL) 5 MG tablet, Take 1 tablet (5 mg) by mouth daily, Disp: 90 tablet, Rfl: 0  potassium chloride SA (K-DUR/KLOR-CON M) 20 MEQ CR tablet, Take 1 tablet (20 mEq) by mouth 2 times daily, Disp: 180 tablet, Rfl: 0  oxybutynin (DITROPAN) 5 MG tablet, Take 1 tablet (5 mg) by mouth 2 times daily, Disp: 180 tablet, Rfl: 0  pentoxifylline (TRENTAL) 400 MG CR tablet, Take 1 tablet (400 mg) by mouth 3 times daily (with meals), Disp: 90 tablet, Rfl: 0  torsemide (DEMADEX) 10 MG tablet, Take 2 tablets (20 mg) by mouth At Bedtime, Disp: 180 tablet, Rfl: 3  carvedilol (COREG) 6.25 MG tablet, Take 2 tablets (12.5 mg) by mouth 2 times daily (with meals), Disp: 180 tablet, Rfl: 1  traMADol (ULTRAM) 50 MG tablet, TAKE 1-2 TABLET BY MOUTH EVERY 6 HOURS AS NEEDED FOR PAIN, Disp: 240 tablet, Rfl: 1  blood glucose monitoring (NO BRAND SPECIFIED) test strip, Use to test blood sugars 3 -4  times daily or as directed, Disp: 3 Month, Rfl: 3  ASPIRIN NOT PRESCRIBED (INTENTIONAL), Antiplatelet medication not prescribed intentionally due to Current anticoagulant therapy (warfarin/enoxaparin), Disp: 0 each, Rfl: 0  meclizine (ANTIVERT) 25 MG tablet, Take 1 tablet (25 mg) by mouth 3 times daily as needed for dizziness, Disp: 180 tablet, Rfl: 1  fluticasone (FLONASE) 50 MCG/ACT nasal spray, Spray 1-2 sprays into both nostrils daily, Disp: 3 Bottle, Rfl: 3  albuterol (PROVENTIL) (5 MG/ML) 0.5% nebulizer solution, Take 0.5 mLs (2.5 mg) by nebulization every 4 hours as needed for wheezing or shortness of breath / dyspnea (dyspnea), Disp: 60 vial, Rfl: 1  atorvastatin (LIPITOR) 40 MG tablet, Take 1 tablet (40 mg) by mouth At Bedtime, Disp: 90 tablet, Rfl: 3  budesonide (PULMICORT) 0.5 MG/2ML nebulizer solution, Take 2 mLs (0.5 mg) by nebulization  2 times daily as needed, Disp: 60 ampule, Rfl: 1  Cyanocobalamin (VITAMIN B 12) 100 MCG LOZG, Take 1,000 mcg by mouth daily, Disp: 100 lozenge, Rfl: 1  Cholecalciferol (VITAMIN D3) 2000 UNITS CAPS, Take 2,000 Units by mouth daily, Disp: 90 capsule, Rfl: 1  NOVOLIN MIX 70/30 VIAL (70-30) 100 UNIT/ML susp, Inject 30 Units Subcutaneous 2 times daily (with meals), Disp: 10 mL, Rfl: 11  nitroglycerin (NITROSTAT) 0.4 MG SL tablet, If chest pain place under tongue call 911 if pain not resolved/ repeat every 5 min for total of 3 doses, Disp: 30 tablet, Rfl: 3  JANTOVEN 2.5 MG tablet, TK 3 TO 4 TS PO QD OR UTD BY INR CLINIC, Disp: 100 tablet, Rfl: 3  betamethasone valerate (VALISONE) 0.1 % ointment, Apply topically daily To inside of ears., Disp: 15 g, Rfl: 0  megestrol (MEGACE) 40 MG tablet, , Disp: , Rfl: 1  Warfarin Therapy Reminder, 1 each continuous prn, Disp: , Rfl:   saccharomyces boulardii (FLORASTOR) 250 MG capsule, Take 1 capsule (250 mg) by mouth 2 times daily, Disp: 14 capsule, Rfl:   sennosides (SENOKOT) 8.6 MG tablet, Take 1-4 tablets by mouth daily as needed for constipation, Disp: , Rfl:   acetaminophen 650 MG 8 hour tablet, Take 650 mg by mouth every 6 hours as needed for mild pain, Disp: 100 tablet, Rfl:   alum & mag hydroxide-simethicone (MYLANTA ES/MAALOX  ES) 400-400-40 MG/5ML SUSP, Take 15-30 mLs by mouth every 4 hours as needed for indigestion, Disp: , Rfl: 0  Cod Liver Oil CAPS, Take 1 capsule by mouth daily, Disp: , Rfl:   polyethylene glycol (MIRALAX/GLYCOLAX) packet, Take 1 packet by mouth 2 times daily as needed for constipation , Disp: , Rfl:   CINNAMON PO, Take 1,000 mg by mouth daily, Disp: , Rfl:   latanoprost (XALATAN) 0.005 % ophthalmic solution, Place 1 drop into both eyes At Bedtime., Disp: , Rfl:       Patient Active Problem List:     Diarrhea     Respiratory failure (H)     Generalized muscle weakness     Diabetes mellitus, type 2 (H)     Atrial fibrillation (H)     Azotemia      Hypertension     Congestive heart failure (H)     Obstructive sleep apnea     Diastolic dysfunction     Peripheral vascular disease (H)     Cardiomyopathy (H)     Bradycardia     Hyperlipidemia     Palpitations     Aortic valve disease     Health Care Home     Urinary incontinence     Nodule of right lung     Physical deconditioning     Squamous cell carcinoma of lung, stage I (H)     Anxiety     Depression     Chronic leg pain     Leg wound, right     Chronic pain     Non-healing wound of lower extremity     Chronic renal disease     Pancytopenia (H)     Calf ulcer, right, with unspecified severity (H)     Long-term (current) use of anticoagulants [Z79.01]     Vaginal bleeding     Abnormal finding on ultrasound     Morbid obesity with alveolar hypoventilation (H)     S/P hysterectomy with oophorectomy     Acute on chronic renal failure (H)     Parotitis     COPD exacerbation (H)     Type 2 diabetes mellitus with diabetic nephropathy (H)     Morbid obesity (H)      Documentation of Face to Face and Certification for Home Health Services    I certify that patient, Malgorzata Lan is under my care and that I, or a Nurse Practitioner or Physician's Assistant working with me, had a face-to-face encounter that meets the physician face-to-face encounter requirements with this patient on: 3/23/2017.    This encounter with the patient was in whole, or in part, for the following medical condition, which is the primary reason for Home Health Care: open wound.    I certify that, based on my findings, the following services are medically necessary Home Health Services: Nursing    My clinical findings support the need for the above services because: Nurse is needed: wound care.    Further, I certify that my clinical findings support that this patient is homebound (i.e. absences from home require considerable and taxing effort and are for medical reasons or [facility] services or infrequently or of short duration when for other  reasons) because: Requires assistance of another person or specialized equipment to access medical services because patient: Is unable to exit home safely on own due to: being wheelchair bound., Requires supervision of another for safe transfer.    Based on the above findings, I certify that this patient is confined to the home and needs intermittent skilled nursing care, physical therapy and/or speech therapy.  The patient is under my care, and I have initiated the establishment of the plan of care.  This patient will be followed by a physician who will periodically review the plan of care.    Physician/Provider to provide follow up care: Clair Michelle    [facility] certified Physician at time of discharge: MAZIN Mccoy CNP      Please be aware that coverage of these services is subject to the terms and limitations of your health insurance plan.  Call member services at your health plan with any benefit or coverage questions.                  Your next 10 appointments already scheduled     Mar 23, 2017  1:00 PM CDT   [facility]    [address] [id number] [phone]           Bring a current list of meds and any records pertaining to this visit.  For Physicals, please bring immunization records and any forms needing to be filled out.  Please arrive 10 minutes early to complete paperwork.            Apr 11, 2017  3:00 PM CDT   [facility] Clair Michelle,    [facility]    [address] [address]   [phone]           Bring a current list of meds and any records pertaining to this visit.  For Physicals, please bring immunization records and any forms needing to be filled out.  Please arrive 10 minutes early to complete paperwork.            Jun 07, 2017  2:45 PM CDT   Return Visit with Ed Shine MD   [facility]  "[facility] [place] ([facility])    [address]  [place] [address] [phone]            Jun 14, 2017  4:00 PM CDT   Remote PPM Check with BUCIO TECH1   [facility] [place] ([facility])    [address]  [place] [address]   [phone]           This appointment is for a remote check of your pacemaker.  This is not an appointment at the office.              If Something Goes Wrong     I will check my blood sugars twice a day     Notes - Note created  7/17/2014 10:25 AM by Jeannine Baker, RN    As of today's date 7/17/2014 goal is met at 76 - 100%.   Goal Status:  Active        Who to contact     If you have questions or need follow up information about today's clinic visit or your schedule please contact [facility] directly at [phone].  Normal or non-critical lab and imaging results will be communicated to you by MyChart, letter or phone within 4 business days after the clinic has received the results. If you do not hear from us within 7 days, please contact the clinic through [employer] or phone. If you have a critical or abnormal lab result, we will notify you by phone as soon as possible.  Submit refill requests through [employer] or call your pharmacy and they will forward the refill request to us. Please allow 3 business days for your refill to be completed.          Additional Information About Your Visit        Care EveryWhere ID     This is your Care EveryWhere ID. This could be used by other organizations to access your [place] medical records  [id number]        Your Vitals Were     Pulse Temperature Height Last Period Pulse Oximetry BMI (Body Mass Index)    59 97.4  F (36.3  C) (Oral) 5' 5\" (1.651 m) (LMP Unknown) 96% 47.59 kg/m2       Blood Pressure from Last 3 Encounters:   03/23/17 128/78   01/25/17 112/68   10/18/16 121/68    Weight from Last 3 Encounters:   03/23/17 286 lb " (129.7 kg)   10/18/16 286 lb (129.7 kg)   06/08/16 282 lb (127.9 kg)              We Performed the Following     HOME CARE NURSING REFERRAL        Primary Care Provider Office Phone # Fax #    Clair Michelle [phone]       [facility] [address]  [place] [address]        Goals        General    I will call my PCP right away if I have increased shortness of breath.   (pt-stated)     Notes - Note created  7/17/2014 10:27 AM by Jeannine Baker RN    As of today's date 7/17/2014 goal is met at 51 - 75%.   Goal Status:  Active      I will weigh myself daily and records results (pt-stated)     Notes - Note created  7/17/2014 10:25 AM by Jeannine Baker RN    As of today's date 7/17/2014 goal is met at 76 - 100%.   Goal Status:  Active        Thank you!     Thank you for choosing [facility]  for your care. Our goal is always to provide you with excellent care. Hearing back from our patients is one way we can continue to improve our services. Please take a few minutes to complete the written survey that you may receive in the mail after your visit with us. Thank you!             Your Updated Medication List - Protect others around you: Learn how to safely use, store and throw away your medicines at www.disposemymeds.org.          This list is accurate as of: 3/23/17 12:31 PM.  Always use your most recent med list.                   Brand Name Dispense Instructions for use    acetaminophen 650 MG 8 hour tablet     100 tablet    Take 650 mg by mouth every 6 hours as needed for mild pain       albuterol (5 MG/ML) 0.5% neb solution    PROVENTIL    60 vial    Take 0.5 mLs (2.5 mg) by nebulization every 4 hours as needed for wheezing or shortness of breath / dyspnea (dyspnea)       alum & mag hydroxide-simethicone 400-400-40 MG/5ML Susp suspension    MYLANTA ES/MAALOX  ES     Take 15-30 mLs by mouth every 4 hours as needed for indigestion       ASPIRIN NOT PRESCRIBED     INTENTIONAL    0 each    Antiplatelet medication not prescribed intentionally due to Current anticoagulant therapy (warfarin/enoxaparin)       atorvastatin 40 MG tablet    LIPITOR    90 tablet    Take 1 tablet (40 mg) by mouth At Bedtime       betamethasone valerate 0.1 % ointment    VALISONE    15 g    Apply topically daily To inside of ears.       blood glucose monitoring test strip    no brand specified    3 Month    Use to test blood sugars 3 -4  times daily or as directed       budesonide 0.5 MG/2ML neb solution    PULMICORT    60 ampule    Take 2 mLs (0.5 mg) by nebulization 2 times daily as needed       calcitonin (salmon) 200 UNIT/ACT nasal spray    MIACALCIN    11.1 mL    SPRAY 1 SPRAY INTO ONE NOSTRIL DAILY ALTERNATE NOSTRIL EACH DAY, RIGHT NOSTRIL ON EVEN DAYS AND LEFT NOSTRIL ON ODD DAYS       carvedilol 6.25 MG tablet    COREG    180 tablet    Take 2 tablets (12.5 mg) by mouth 2 times daily (with meals)       CINNAMON PO      Take 1,000 mg by mouth daily       cod liver oil Caps capsule      Take 1 capsule by mouth daily       digoxin 125 MCG tablet    LANOXIN    90 tablet    Take 1 tablet (125 mcg) by mouth daily       fluticasone 50 MCG/ACT spray    FLONASE    3 Bottle    Spray 1-2 sprays into both nostrils daily       latanoprost 0.005 % ophthalmic solution    XALATAN     Place 1 drop into both eyes At Bedtime.       lisinopril 5 MG tablet    PRINIVIL/ZESTRIL    90 tablet    Take 1 tablet (5 mg) by mouth daily       meclizine 25 MG tablet    ANTIVERT    180 tablet    Take 1 tablet (25 mg) by mouth 3 times daily as needed for dizziness       megestrol 40 MG tablet    MEGACE         nitroglycerin 0.4 MG sublingual tablet    NITROSTAT    30 tablet    If chest pain place under tongue call 911 if pain not resolved/ repeat every 5 min for total of 3 doses       NovoLIN MIX 70/30 VIAL injection   Generic drug:  insulin NPH-insulin regular     10 mL    Inject 30 Units Subcutaneous 2 times daily (with meals)        oxybutynin 5 MG tablet    DITROPAN    180 tablet    Take 1 tablet (5 mg) by mouth 2 times daily       pentoxifylline 400 MG CR tablet    TRENtal    90 tablet    Take 1 tablet (400 mg) by mouth 3 times daily (with meals)       polyethylene glycol Packet    MIRALAX/GLYCOLAX     Take 1 packet by mouth 2 times daily as needed for constipation       potassium chloride SA 20 MEQ CR tablet    K-DUR/KLOR-CON M    180 tablet    Take 1 tablet (20 mEq) by mouth 2 times daily       saccharomyces boulardii 250 MG capsule    FLORASTOR    14 capsule    Take 1 capsule (250 mg) by mouth 2 times daily       sennosides 8.6 MG tablet    SENOKOT     Take 1-4 tablets by mouth daily as needed for constipation       torsemide 10 MG tablet    DEMADEX    180 tablet    Take 2 tablets (20 mg) by mouth At Bedtime       traMADol 50 MG tablet    ULTRAM    240 tablet    TAKE 1-2 TABLET BY MOUTH EVERY 6 HOURS AS NEEDED FOR PAIN       Vitamin B 12 100 MCG Lozg     100 lozenge    Take 1,000 mcg by mouth daily       vitamin D3 2000 UNITS Caps     90 capsule    Take 2,000 Units by mouth daily       * Warfarin Therapy Reminder      1 each continuous prn       * JANTOVEN 2.5 MG tablet   Generic drug:  warfarin     100 tablet    TK 3 TO 4 TS PO QD OR UTD BY INR CLINIC       * Notice:  This list has 2 medication(s) that are the same as other medications prescribed for you. Read the directions carefully, and ask your doctor or other care provider to review them with you.

## 2017-03-23 NOTE — PROGRESS NOTES
HPI      SUBJECTIVE:                                                    Malgorzata Lan is a 76 year old female who presents to clinic today for the following health issues:      Chief Complaint   Patient presents with     Wound Check     Nickel sized open wound of left lower extremity.  Noticed x 3 wks ago       For the last 10 years has had issues with very tiny ulcers of her lower legs   Noticed a new one about 3 weeks ago that is now about the size of a nickel   No significant pain   No fevers   Lives in a senior apt and had her friend change a bandage   She is unable to change her own bandage d/t her body habitus       Past Medical History:   Diagnosis Date     Aortic stenosis, moderate     on echo 3-14      Chronic leg pain      Congestive heart failure, unspecified     worsened during hospitalization/ diastolic failure  echo 3-2014 EF 55-60%/      COPD (chronic obstructive pulmonary disease) (H)      Depression      Diabetes mellitus (H)      Diastolic dysfunction      DJD (degenerative joint disease)      Glaucoma      Hyperlipidemia      Hyperparathyroidism (H)      Hypertension      Influenza A with pneumonia 3-2014    hospitalized      Lung cancer (H)      Morbid obesity (H)      Obstructive sleep apnea     wears CPAP     Pancytopenia (H)      Peripheral vascular disease (H)      Persistent atrial fibrillation (H) 06/08/2004     Pickwickian syndrome (H)      Renal insufficiency      Respiratory failure (H) 3-2014    hospitalized     Sinus node dysfunction (H)     TACHYBRADY SYNDROME s/p PPM     Squamous cell carcinoma of lung, stage I (H)      Syncope      Past Surgical History:   Procedure Laterality Date     APPLY WOUND VAC Right 8/4/2015    Procedure: APPLY WOUND VAC;  Surgeon: Yandel Fair MD;  Location: [facility]     BONE MARROW BIOPSY, BONE SPECIMEN, NEEDLE/TROCAR  10/30/2013    Procedure: BIOPSY BONE MARROW;  BONE MARROW BIOPSY ;  Surgeon: Kimani Lobo MD;  Location:  GI      CHOLECYSTECTOMY       DAVINCI HYSTERECTOMY TOTAL, BILATERAL SALPINGO-OOPHORECTOMY, NODE DISSECTION, COMBINED N/A 3/30/2016    Procedure: COMBINED DAVINCI HYSTERECTOMY TOTAL, SALPINGO-OOPHORECTOMY, NODE DISSECTION;  Surgeon: Kristin Arriaza MD;  Location: [facility] OR     DILATION AND CURETTAGE N/A 3/18/2016    Procedure: DILATION AND CURETTAGE;  Surgeon: Gilmer Cruz MD;  Location: [facility] OR     GRAFT SKIN SPLIT THICKNESS FROM EXTREMITY Right 8/4/2015    Procedure: GRAFT SKIN SPLIT THICKNESS FROM EXTREMITY;  Surgeon: Yandel Fair MD;  Location:  [facility]     IMPLANT PACEMAKER  7/31/2013    Single chamber [place] Sci PPM      IRRIGATION AND DEBRIDEMENT LOWER EXTREMITY, COMBINED Right 7/10/2015    Procedure: COMBINED IRRIGATION AND DEBRIDEMENT LOWER EXTREMITY;  Surgeon: Yandel Fair MD;  Location:  OR     ORTHOPEDIC SURGERY      t oes     SINUS SURGERY       THORACOSCOPIC WEDGE RESECTION LUNG Right 3/17/2015    Procedure: THORACOSCOPIC WEDGE RESECTION LUNG;  Surgeon: Santos Crenshaw MD;  Location:  OR     tonsils       Social History   Substance Use Topics     Smoking status: Former Smoker     Packs/day: 1.00     Years: 30.00     Types: Cigarettes     Quit date: 8/14/1993     Smokeless tobacco: Never Used     Alcohol use 0.0 oz/week     0 Standard drinks or equivalent per week      Comment: 2 a week      Current Outpatient Prescriptions   Medication Sig Dispense Refill     digoxin (LANOXIN) 125 MCG tablet Take 1 tablet (125 mcg) by mouth daily 90 tablet 3     calcitonin, salmon, (MIACALCIN) 200 UNIT/ACT nasal spray SPRAY 1 SPRAY INTO ONE NOSTRIL DAILY ALTERNATE NOSTRIL EACH DAY, RIGHT NOSTRIL ON EVEN DAYS AND LEFT NOSTRIL ON ODD DAYS 11.1 mL 3     lisinopril (PRINIVIL/ZESTRIL) 5 MG tablet Take 1 tablet (5 mg) by mouth daily 90 tablet 0     potassium chloride SA (K-DUR/KLOR-CON M) 20 MEQ CR tablet Take 1 tablet (20 mEq) by mouth 2 times daily 180 tablet 0     oxybutynin (DITROPAN) 5 MG  tablet Take 1 tablet (5 mg) by mouth 2 times daily 180 tablet 0     pentoxifylline (TRENTAL) 400 MG CR tablet Take 1 tablet (400 mg) by mouth 3 times daily (with meals) 90 tablet 0     torsemide (DEMADEX) 10 MG tablet Take 2 tablets (20 mg) by mouth At Bedtime 180 tablet 3     carvedilol (COREG) 6.25 MG tablet Take 2 tablets (12.5 mg) by mouth 2 times daily (with meals) 180 tablet 1     traMADol (ULTRAM) 50 MG tablet TAKE 1-2 TABLET BY MOUTH EVERY 6 HOURS AS NEEDED FOR PAIN 240 tablet 1     blood glucose monitoring (NO BRAND SPECIFIED) test strip Use to test blood sugars 3 -4  times daily or as directed 3 Month 3     ASPIRIN NOT PRESCRIBED (INTENTIONAL) Antiplatelet medication not prescribed intentionally due to Current anticoagulant therapy (warfarin/enoxaparin) 0 each 0     meclizine (ANTIVERT) 25 MG tablet Take 1 tablet (25 mg) by mouth 3 times daily as needed for dizziness 180 tablet 1     fluticasone (FLONASE) 50 MCG/ACT nasal spray Spray 1-2 sprays into both nostrils daily 3 Bottle 3     albuterol (PROVENTIL) (5 MG/ML) 0.5% nebulizer solution Take 0.5 mLs (2.5 mg) by nebulization every 4 hours as needed for wheezing or shortness of breath / dyspnea (dyspnea) 60 vial 1     atorvastatin (LIPITOR) 40 MG tablet Take 1 tablet (40 mg) by mouth At Bedtime 90 tablet 3     budesonide (PULMICORT) 0.5 MG/2ML nebulizer solution Take 2 mLs (0.5 mg) by nebulization 2 times daily as needed 60 ampule 1     Cyanocobalamin (VITAMIN B 12) 100 MCG LOZG Take 1,000 mcg by mouth daily 100 lozenge 1     Cholecalciferol (VITAMIN D3) 2000 UNITS CAPS Take 2,000 Units by mouth daily 90 capsule 1     NOVOLIN MIX 70/30 VIAL (70-30) 100 UNIT/ML susp Inject 30 Units Subcutaneous 2 times daily (with meals) 10 mL 11     nitroglycerin (NITROSTAT) 0.4 MG SL tablet If chest pain place under tongue call 911 if pain not resolved/ repeat every 5 min for total of 3 doses 30 tablet 3     JANTOVEN 2.5 MG tablet TK 3 TO 4 TS PO QD OR UTD BY INR CLINIC  "100 tablet 3     betamethasone valerate (VALISONE) 0.1 % ointment Apply topically daily To inside of ears. 15 g 0     megestrol (MEGACE) 40 MG tablet   1     Warfarin Therapy Reminder 1 each continuous prn       saccharomyces boulardii (FLORASTOR) 250 MG capsule Take 1 capsule (250 mg) by mouth 2 times daily 14 capsule      sennosides (SENOKOT) 8.6 MG tablet Take 1-4 tablets by mouth daily as needed for constipation       acetaminophen 650 MG 8 hour tablet Take 650 mg by mouth every 6 hours as needed for mild pain 100 tablet      alum & mag hydroxide-simethicone (MYLANTA ES/MAALOX  ES) 400-400-40 MG/5ML SUSP Take 15-30 mLs by mouth every 4 hours as needed for indigestion  0     Cod Liver Oil CAPS Take 1 capsule by mouth daily       polyethylene glycol (MIRALAX/GLYCOLAX) packet Take 1 packet by mouth 2 times daily as needed for constipation        CINNAMON PO Take 1,000 mg by mouth daily       latanoprost (XALATAN) 0.005 % ophthalmic solution Place 1 drop into both eyes At Bedtime.       Allergies   Allergen Reactions     Food Anaphylaxis     With green peppers     Codeine Sulfate Swelling     Throats swell     Flagyl [Metronidazole Hcl] Swelling     Hydrocodone Visual Disturbance     Pantoprazole Unknown     Penicillins Unknown     Hasn't had it for 30 years     Diltiazem Rash       Reviewed PMH, med list and allergies.      ROS  Detailed as above       /78 (BP Location: Right arm, Patient Position: Chair, Cuff Size: Adult Large)  Pulse 59  Temp 97.4  F (36.3  C) (Oral)  Ht 5' 5\" (1.651 m)  Wt 286 lb (129.7 kg)  LMP  (LMP Unknown)  SpO2 96%  BMI 47.59 kg/m2      Physical Exam   Constitutional: She is well-developed, well-nourished, and in no distress.   W/c bound   Neurological: She is alert.   Skin:   Nickel size superficial ulceration of left lower anterior mid shin. No surrounding erythema.  Chronic hyperpigmentation to lower leg    Psychiatric: Mood and affect normal.   Vitals " reviewed.      Assessment and Plan:       ICD-10-CM    1. Open wound of lower limb, left, initial encounter S81.802A HOME CARE NURSING REFERRAL       Open wound left lower leg for 3 weeks   She is unable to complete wound care d/t her body habitus and requests home care as she is wheel chair bound.  Wound rebandaged today       MAZIN Mccoy, CNP  [facility]

## 2017-03-24 ASSESSMENT — ANXIETY QUESTIONNAIRES: GAD7 TOTAL SCORE: 0

## 2017-03-29 ENCOUNTER — ANTICOAGULATION THERAPY VISIT (OUTPATIENT)
Dept: NURSING | Facility: CLINIC | Age: 77
End: 2017-03-29
Payer: MEDICARE

## 2017-03-29 DIAGNOSIS — I48.91 ATRIAL FIBRILLATION (H): ICD-10-CM

## 2017-03-29 DIAGNOSIS — Z79.01 LONG-TERM (CURRENT) USE OF ANTICOAGULANTS: ICD-10-CM

## 2017-03-29 LAB — INR PPP: 2.6

## 2017-03-29 PROCEDURE — 99207 ZZC NO CHARGE NURSE ONLY: CPT | Performed by: INTERNAL MEDICINE

## 2017-03-29 NOTE — MR AVS SNAPSHOT
Malgorzata Lan   3/29/2017   Anticoagulation Therapy Visit    Description:  76 year old female   Provider:  Clair Michelle DO   Department:  [facility]           INR as of 3/29/2017     Today's INR 2.6 (3/28/2017)      Anticoagulation Summary as of 3/29/2017     INR goal 2.0-3.0   Today's INR 2.6 (3/28/2017)   Full instructions 5 mg every day   Next INR check 4/11/2017    Indications   Long-term (current) use of anticoagulants [Z79.01] [Z79.01]  Atrial fibrillation (H) [I48.91]         Description     Patient does home INR with Ale[facility].  Call to patient, left detailed message on patient's VM with dosing instructions and next INR recheck.      Contact Numbers     Clinic Number:         March 2017 Details    Sun Mon Tue Wed Thu Fri Sat        1               2               3               4                 5               6               7               8               9               10               11                 12               13               14               15               16               17               18                 19               20               21               22               23               24               25                 26               27               28               29      5 mg   See details      30      5 mg         31      5 mg           Date Details   03/29 This INR check               How to take your warfarin dose     To take:  5 mg Take 2 of the 2.5 mg tablets.           April 2017 Details    Sun Mon Tue Wed Thu Fri Sat           1      5 mg           2      5 mg         3      5 mg         4      5 mg         5      5 mg         6      5 mg         7      5 mg         8      5 mg           9      5 mg         10      5 mg         11            12               13               14               15                 16               17               18               19               20               21               22                 23               24                25               26               27               28               29                 30                      Date Details   No additional details    Date of next INR:  4/11/2017         How to take your warfarin dose     To take:  5 mg Take 2 of the 2.5 mg tablets.

## 2017-04-05 ENCOUNTER — DOCUMENTATION ONLY (OUTPATIENT)
Dept: CARE COORDINATION | Facility: CLINIC | Age: 77
End: 2017-04-05

## 2017-04-05 NOTE — PROGRESS NOTES
Dr. Michelle,    I was asked to see this patient today d/t a non healing stasis ulcer of her LLE. Her normal visit nurse was concerned also that it had a light purple aishwarya wound that was not there prior. Today her wound is 1.4cmx1.3cm, rutty red wound bed, light purple margins and normal hemosiderin staining to surrounding skin. Drainage is small/moderate serous. Her Edema is good at 1+. The wound itself is slightly puffy in appearance but no other s/sx infection. No pain. They are currently treating it with bacitracin. She has a hx of non healing stasis ulcers on her RLE which required skin grafting per patient Hx report. D/t her Hx, I would like to treat her wound differently and request the ok for orders as follows.    1. Cleanse wound with wound cleanser and pat dry.  2. Apply lightly moistened Endoform to wound bed, cut to size.   3. Cover with a piece of Hydrofera Blue Ready foam.  4. Secure and change MWF and as needed.    Thank you,    Michael Stromberg BSN, RN, CWOCN  [phone]  [email or web address]

## 2017-04-11 ENCOUNTER — ANTICOAGULATION THERAPY VISIT (OUTPATIENT)
Dept: NURSING | Facility: CLINIC | Age: 77
End: 2017-04-11
Payer: MEDICARE

## 2017-04-11 ENCOUNTER — OFFICE VISIT (OUTPATIENT)
Dept: FAMILY MEDICINE | Facility: CLINIC | Age: 77
End: 2017-04-11
Payer: MEDICARE

## 2017-04-11 VITALS
RESPIRATION RATE: 18 BRPM | TEMPERATURE: 96.8 F | SYSTOLIC BLOOD PRESSURE: 112 MMHG | WEIGHT: 286 LBS | HEIGHT: 65 IN | DIASTOLIC BLOOD PRESSURE: 68 MMHG | HEART RATE: 72 BPM | BODY MASS INDEX: 47.65 KG/M2 | OXYGEN SATURATION: 96 %

## 2017-04-11 DIAGNOSIS — D61.818 PANCYTOPENIA (H): ICD-10-CM

## 2017-04-11 DIAGNOSIS — M85.80 OSTEOPENIA: ICD-10-CM

## 2017-04-11 DIAGNOSIS — Z79.4 TYPE 2 DIABETES MELLITUS WITH DIABETIC NEPHROPATHY, WITH LONG-TERM CURRENT USE OF INSULIN (H): ICD-10-CM

## 2017-04-11 DIAGNOSIS — R32 UNSPECIFIED URINARY INCONTINENCE: ICD-10-CM

## 2017-04-11 DIAGNOSIS — Z78.0 ASYMPTOMATIC POSTMENOPAUSAL STATUS: ICD-10-CM

## 2017-04-11 DIAGNOSIS — G89.4 CHRONIC PAIN SYNDROME: ICD-10-CM

## 2017-04-11 DIAGNOSIS — E66.01 MORBID OBESITY, UNSPECIFIED OBESITY TYPE (H): ICD-10-CM

## 2017-04-11 DIAGNOSIS — D22.5 MELANOCYTIC NEVUS OF TRUNK: Primary | ICD-10-CM

## 2017-04-11 DIAGNOSIS — Z79.01 LONG-TERM (CURRENT) USE OF ANTICOAGULANTS: ICD-10-CM

## 2017-04-11 DIAGNOSIS — Z23 NEED FOR VACCINATION: ICD-10-CM

## 2017-04-11 DIAGNOSIS — E11.21 TYPE 2 DIABETES MELLITUS WITH DIABETIC NEPHROPATHY, WITH LONG-TERM CURRENT USE OF INSULIN (H): ICD-10-CM

## 2017-04-11 DIAGNOSIS — S81.802D NON-HEALING WOUND OF LOWER EXTREMITY, LEFT, SUBSEQUENT ENCOUNTER: ICD-10-CM

## 2017-04-11 DIAGNOSIS — L30.9 DERMATITIS: ICD-10-CM

## 2017-04-11 DIAGNOSIS — I48.91 ATRIAL FIBRILLATION (H): ICD-10-CM

## 2017-04-11 LAB
BASOPHILS NFR BLD AUTO: 1 %
DIFFERENTIAL METHOD BLD: NORMAL
ERYTHROCYTE [DISTWIDTH] IN BLOOD BY AUTOMATED COUNT: 14.7 % (ref 10–15)
HCT VFR BLD AUTO: 33.4 % (ref 35–47)
HGB BLD-MCNC: 10.9 G/DL (ref 11.7–15.7)
INR POINT OF CARE: 2.2 (ref 0.86–1.14)
LYMPHOCYTES NFR BLD AUTO: 45 %
MCH RBC QN AUTO: 31.5 PG (ref 26.5–33)
MCHC RBC AUTO-ENTMCNC: 32.6 G/DL (ref 31.5–36.5)
MCV RBC AUTO: 97 FL (ref 78–100)
MONOCYTES NFR BLD AUTO: 10 %
NEUTROPHILS NFR BLD AUTO: 44 %
PLATELET # BLD AUTO: 96 10E9/L (ref 150–450)
PLATELET # BLD EST: NORMAL 10*3/UL
RBC # BLD AUTO: 3.46 10E12/L (ref 3.8–5.2)
RBC MORPH BLD: NORMAL
WBC # BLD AUTO: 3 10E9/L (ref 4–11)

## 2017-04-11 PROCEDURE — 85027 COMPLETE CBC AUTOMATED: CPT | Performed by: INTERNAL MEDICINE

## 2017-04-11 PROCEDURE — G0009 ADMIN PNEUMOCOCCAL VACCINE: HCPCS | Performed by: INTERNAL MEDICINE

## 2017-04-11 PROCEDURE — 90670 PCV13 VACCINE IM: CPT | Performed by: INTERNAL MEDICINE

## 2017-04-11 PROCEDURE — 99207 ZZC NO CHARGE NURSE ONLY: CPT | Performed by: INTERNAL MEDICINE

## 2017-04-11 PROCEDURE — 36416 COLLJ CAPILLARY BLOOD SPEC: CPT | Performed by: INTERNAL MEDICINE

## 2017-04-11 PROCEDURE — 85610 PROTHROMBIN TIME: CPT | Mod: QW | Performed by: INTERNAL MEDICINE

## 2017-04-11 PROCEDURE — 99215 OFFICE O/P EST HI 40 MIN: CPT | Mod: 25 | Performed by: INTERNAL MEDICINE

## 2017-04-11 PROCEDURE — 80053 COMPREHEN METABOLIC PANEL: CPT | Performed by: INTERNAL MEDICINE

## 2017-04-11 PROCEDURE — 99207 ZZC NO CHARGE LOS: CPT | Performed by: INTERNAL MEDICINE

## 2017-04-11 PROCEDURE — 82306 VITAMIN D 25 HYDROXY: CPT | Performed by: INTERNAL MEDICINE

## 2017-04-11 RX ORDER — OXYBUTYNIN CHLORIDE 5 MG/1
5 TABLET ORAL DAILY
Qty: 90 TABLET | Refills: 1 | Status: SHIPPED | OUTPATIENT
Start: 2017-04-11 | End: 2017-08-09

## 2017-04-11 NOTE — PROGRESS NOTES
SUBJECTIVE:                                                    Malgorzata Lan is a 76 year old female who presents to clinic today for the following health issues:    Chief Complaint   Patient presents with     Wound Check     X5 weeks-lower left extremity        Diabetes Follow-up    Patient is checking blood sugars: twice daily.    Blood sugar testing frequency justification: Uncontrolled diabetes  Results are as follows:         am - 135's         suppertime - 180's-200's    Diabetic concerns: Frequent Sores      Symptoms of hypoglycemia (low blood sugar): none     Paresthesias (numbness or burning in feet) or sores: Yes      Date of last diabetic eye exam:        Amount of exercise or physical activity: None    Problems taking medications regularly: No    Medication side effects: none    Diet: regular (no restrictions)      Complaining of itching ears, and is requesting another prescription of betamethasone valerate which has worked well in the past.  Home care is going well. They are helping with wound care on her left lower extremity every MWF.   Appointment with endocrinologist Dr. Melara tomorrow.   Last DEXA 2012, using Miacalcin per Dr. Reed for the past few years.  INR has been good.   Pt reports no urinary incontinence, we discussed oxybutynin reduction to 5 mg QD, since she has side effects of constipation and dry mouth.  Bladder retraining will be helpful.  100 mg Tramadol in the morning and 50 at night. If pain is really bad she will take 100 mg at night too. She finds this effective for chronic pain.    Problem list and histories reviewed & adjusted, as indicated.    ROS:  Detailed as above    This document serves as a record of the services and decisions personally performed and made by Clair Michelle DO. It was created on his/her behalf by Shahla Martin, a trained medical scribe. The creation of this document is based the provider's statements to the medical scribe.  Scribe  "Shahla Martin 3:01 PM, April 11, 2017   OBJECTIVE:                                                    /68 (BP Location: Right arm, Patient Position: Right side, Cuff Size: Adult Large)  Pulse 72  Temp 96.8  F (36  C) (Oral)  Resp 18  Ht 5' 5\" (1.651 m)  Wt 286 lb (129.7 kg)  LMP  (LMP Unknown)  SpO2 96%  BMI 47.59 kg/m2  Body mass index is 47.59 kg/(m^2).  Alert, pleasant, cooperative, NAD, morbidly obese, sitting in power wheelchair   Normal affect  Superficial venous prominence on tip of nose, dark raised nevus on abdomen  Bilateral REGINA with venous stasis, bandages on left shin/calf     ASSESSMENT/PLAN:                                                    1. Melanocytic nevus of trunk  As above  - DERMATOLOGY REFERRAL    2. Unspecified urinary incontinence  Reduced dose d/t side effects  Bladder retraining  - oxybutynin (DITROPAN) 5 MG tablet; Take 1 tablet (5 mg) by mouth daily  Dispense: 90 tablet; Refill: 1    3. Dermatitis  In left ear  - betamethasone valerate (VALISONE) 0.1 % ointment; Apply topically daily as needed To inside of ears.  Dispense: 15 g; Refill: 2    4. Type 2 diabetes mellitus with diabetic nephropathy, with long-term current use of insulin (H)  Dr. Melara tomorrow  - Comprehensive metabolic panel    5. Non-healing wound of lower extremity, left, subsequent encounter  Home care appreciated    6. Chronic pain syndrome  3 tramadol per day with nightly tylenol     7. Osteopenia  Doesn't recall specifics of past therapy other than is currently on Miacalcin nasal spray  - Vitamin D Deficiency    8. Pancytopenia (H)  Follows with Heme/ONC  - CBC with platelets    9. Asymptomatic postmenopausal status  As above  - DX Hip/Pelvis/Spine; Future    Patient Instructions   Labs and pneumonia vaccianation today   Referral for dermatologist. Talk with them about the spot on your abdomen, and nose  Referral for bone density scan at [phone] in [address]  Prescriptions refilled  Decrease oxybutynin to 5 mg once a day to see if you have less dry mouth/constipation (this is your bladder leaking medication)  See me in about 3-6 months depending on labs/bone density    The information in this document, created by the medical scribe for me, accurately reflects the services I personally performed and the decisions made by me. I have reviewed and approved this document for accuracy prior to leaving the patient care area.  Clair Michelle DO  3:23 PM, 04/11/17    Clair Michelle, DO  [facility]

## 2017-04-11 NOTE — NURSING NOTE
"Chief Complaint   Patient presents with     Wound Check     X5 weeks-lower left extremity        Initial /68 (BP Location: Right arm, Patient Position: Right side, Cuff Size: Adult Large)  Pulse 72  Temp 96.8  F (36  C) (Oral)  Resp 18  Ht 5' 5\" (1.651 m)  Wt 286 lb (129.7 kg)  LMP  (LMP Unknown)  SpO2 96%  BMI 47.59 kg/m2 Estimated body mass index is 47.59 kg/(m^2) as calculated from the following:    Height as of this encounter: 5' 5\" (1.651 m).    Weight as of this encounter: 286 lb (129.7 kg).  Medication Reconciliation: complete   Sabrina Mccall CMA (AAMA)      "

## 2017-04-11 NOTE — LETTER
[address] [facility]  [address]  [place]  [address]  Tel: [phone]    April 14, 2017    Malgorzata Lan  [facility]  [place] [address]        Dear Ms. Lan,    It was nice seeing you recently!   Your labs are stable.   Please continue your vitamin D supplement to keep your level stable.   Please continue with the plan of care as we discussed and let me know if you have any questions/concerns. Thanks!     If you have any further questions or problems, please contact our office.      Sincerely,    Clair Michelle, DO/LC          Enclosure: Lab Results  Results for orders placed or performed in visit on 04/11/17   Comprehensive metabolic panel   Result Value Ref Range    Sodium 139 133 - 144 mmol/L    Potassium 4.0 3.4 - 5.3 mmol/L    Chloride 105 94 - 109 mmol/L    Carbon Dioxide 26 20 - 32 mmol/L    Anion Gap 8 3 - 14 mmol/L    Glucose 133 (H) 70 - 99 mg/dL    Urea Nitrogen 14 7 - 30 mg/dL    Creatinine 1.02 0.52 - 1.04 mg/dL    GFR Estimate 52 (L) >60 mL/min/1.7m2    GFR Estimate If Black 63 >60 mL/min/1.7m2    Calcium 9.8 8.5 - 10.1 mg/dL    Bilirubin Total 0.6 0.2 - 1.3 mg/dL    Albumin 3.6 3.4 - 5.0 g/dL    Protein Total 7.5 6.8 - 8.8 g/dL    Alkaline Phosphatase 103 40 - 150 U/L    ALT 13 0 - 50 U/L    AST 19 0 - 45 U/L   Vitamin D Deficiency   Result Value Ref Range    Vitamin D Deficiency screening 31 20 - 75 ug/L   CBC with platelets   Result Value Ref Range    WBC 3.0 (L) 4.0 - 11.0 10e9/L    RBC Count 3.46 (L) 3.8 - 5.2 10e12/L    Hemoglobin 10.9 (L) 11.7 - 15.7 g/dL    Hematocrit 33.4 (L) 35.0 - 47.0 %    MCV 97 78 - 100 fl    MCH 31.5 26.5 - 33.0 pg    MCHC 32.6 31.5 - 36.5 g/dL    RDW 14.7 10.0 - 15.0 %    Platelet Count 96 (L) 150 - 450 10e9/L   Differential   Result Value Ref Range    % Neutrophils 44.0 %    % Lymphocytes 45.0 %    % Monocytes 10.0 %    % Basophils 1.0 %    RBC Morphology Consistent with reported results     Platelet Estimate  Consistent with reported results     Diff Method Manual Differential

## 2017-04-11 NOTE — PATIENT INSTRUCTIONS
Labs and pneumonia vaccianation today   Referral for dermatologist. Talk with them about the spot on your abdomen, and nose  Referral for bone density scan at [phone] in [address]  Prescriptions refilled  Decrease oxybutynin to 5 mg once a day to see if you have less dry mouth/constipation (this is your bladder leaking medication)  See me in about 3-6 months depending on labs/bone density

## 2017-04-11 NOTE — NURSING NOTE
Screening Questionnaire for Adult Immunization    Are you sick today?   No   Do you have allergies to medications, food, a vaccine component or latex?   No   Have you ever had a serious reaction after receiving a vaccination?   No   Do you have a long-term health problem with heart disease, lung disease, asthma, kidney disease, metabolic disease (e.g. diabetes), anemia, or other blood disorder?   No   Do you have cancer, leukemia, HIV/AIDS, or any other immune system problem?   No   In the past 3 months, have you taken medications that affect  your immune system, such as prednisone, other steroids, or anticancer drugs; drugs for the treatment of rheumatoid arthritis, Crohn s disease, or psoriasis; or have you had radiation treatments?   No   Have you had a seizure, or a brain or other nervous system problem?   No   During the past year, have you received a transfusion of blood or blood     products, or been given immune (gamma) globulin or antiviral drug?   No   For women: Are you pregnant or is there a chance you could become        pregnant during the next month?   No   Have you received any vaccinations in the past 4 weeks?   No     Immunization questionnaire answers were all negative.      MNVFC doesn't apply on this patient    Per orders of Dr. Michelle, injection of PCV 13 given by Sabrina Mccall. Patient instructed to remain in clinic for 20 minutes afterwards, and to report any adverse reaction to me immediately.       Screening performed by Sabrina Mccall on 4/11/2017 at 3:31 PM.

## 2017-04-11 NOTE — MR AVS SNAPSHOT
After Visit Summary   4/11/2017    Malgorzata Lan    MRN: [id number]           Patient Information     Date Of Birth          1940        Visit Information        Provider Department      4/11/2017 3:00 PM Clair Michelle,  [facility] [place]        Today's Diagnoses     Melanocytic nevus of trunk    -  1    Unspecified urinary incontinence        Dermatitis        Type 2 diabetes mellitus with diabetic nephropathy, with long-term current use of insulin (H)        Non-healing wound of lower extremity, left, subsequent encounter        Chronic pain syndrome        Osteopenia        Pancytopenia (H)        Need for prophylactic vaccination against Streptococcus pneumoniae (pneumococcus)          Care Instructions    Labs today   Referral for dermatologist. Talk with them about the spot on your abdomen, and nose  Referral for bone density scan at [phone] in [address] upstairs  Prescriptions refilled  Decrease oxybutynin to 5 mg once a day to see if you have less dry mouth/constipation (this is your bladder leaking medication)  See me in about 3-6 months depending on labs/bone density            Follow-ups after your visit        Additional Services     DERMATOLOGY REFERRAL       Your provider has referred you to: N: [facility] [place] [phone]   [email or web address]    Please be aware that coverage of these services is subject to the terms and limitations of your health insurance plan.  Call member services at your health plan with any benefit or coverage questions.      Please bring the following with you to your appointment:    (1) Any X-Rays, CTs or MRIs which have been performed.  Contact the facility where they were done to arrange for  prior to your scheduled appointment.    (2) List of current medications  (3) This referral request   (4) Any documents/labs given to you for this referral                  Your  "next 10 appointments already scheduled     Jun 07, 2017  2:45 PM CDT   Return Visit with Ed Shine MD   [facility] AT [place] ([facility])    [address]  [place] [address] [phone]            Jun 14, 2017  4:00 PM CDT   Remote PPM Check with BUCIO TECH1   [facility]ART AT [place] ([facility])    [address]  [place] [address] [phone]           This appointment is for a remote check of your pacemaker.  This is not an appointment at the office.              If Something Goes Wrong     I will check my blood sugars twice a day     Notes - Note created  7/17/2014 10:25 AM by Jeannine Baekr RN    As of today's date 7/17/2014 goal is met at 76 - 100%.   Goal Status:  Active        Who to contact     If you have questions or need follow up information about today's clinic visit or your schedule please contact [facility] directly at [phone].  Normal or non-critical lab and imaging results will be communicated to you by MyChart, letter or phone within 4 business days after the clinic has received the results. If you do not hear from us within 7 days, please contact the clinic through MyChart or phone. If you have a critical or abnormal lab result, we will notify you by phone as soon as possible.  Submit refill requests through miDrive or call your pharmacy and they will forward the refill request to us. Please allow 3 business days for your refill to be completed.          Additional Information About Your Visit        Care EveryWhere ID     This is your Care EveryWhere ID. This could be used by other organizations to access your [place] medical records  [id number]        Your Vitals Were     Pulse Temperature Respirations Height Last Period Pulse Oximetry    72 96.8  F (36  C) (Oral) 18 5' 5\" (1.651 m) (LMP Unknown) 96%    BMI (Body Mass Index)                   47.59 " kg/m2            Blood Pressure from Last 3 Encounters:   04/11/17 112/68   03/23/17 128/78   01/25/17 112/68    Weight from Last 3 Encounters:   04/11/17 286 lb (129.7 kg)   03/23/17 286 lb (129.7 kg)   10/18/16 286 lb (129.7 kg)              We Performed the Following     CBC with platelets     Comprehensive metabolic panel     DERMATOLOGY REFERRAL     Vitamin D Deficiency          Today's Medication Changes          These changes are accurate as of: 4/11/17  3:22 PM.  If you have any questions, ask your nurse or doctor.               These medicines have changed or have updated prescriptions.        Dose/Directions    betamethasone valerate 0.1 % ointment   Commonly known as:  VALISONE   This may have changed:    - when to take this  - reasons to take this   Used for:  Dermatitis   Changed by:  Clair Michelle DO        Apply topically daily as needed To inside of ears.   Quantity:  15 g   Refills:  2       oxybutynin 5 MG tablet   Commonly known as:  DITROPAN   This may have changed:  when to take this   Used for:  Unspecified urinary incontinence   Changed by:  Clair Michelle DO        Dose:  5 mg   Take 1 tablet (5 mg) by mouth daily   Quantity:  90 tablet   Refills:  1         Stop taking these medicines if you haven't already. Please contact your care team if you have questions.     megestrol 40 MG tablet   Commonly known as:  MEGACE   Stopped by:  Clair Michelle DO           saccharomyces boulardii 250 MG capsule   Commonly known as:  FLORASTOR   Stopped by:  Clair Michelle DO           sennosides 8.6 MG tablet   Commonly known as:  SENOKOT   Stopped by:  Clair Michelle DO                Where to get your medicines      [facility] [address] [place] [address]  [address], [facility] [address]     Phone:  [phone]     betamethasone valerate 0.1 % ointment    oxybutynin 5 MG tablet                 Primary Care Provider Office Phone # Fax #    Clair Michelle [phone]       [facility] [address]  [place] [address]        Goals        General    I will call my PCP right away if I have increased shortness of breath.   (pt-stated)     Notes - Note created  7/17/2014 10:27 AM by Jeannine Baker RN    As of today's date 7/17/2014 goal is met at 51 - 75%.   Goal Status:  Active      I will weigh myself daily and records results (pt-stated)     Notes - Note created  7/17/2014 10:25 AM by Jeannine Baker RN    As of today's date 7/17/2014 goal is met at 76 - 100%.   Goal Status:  Active        Thank you!     Thank you for choosing [facility]  for your care. Our goal is always to provide you with excellent care. Hearing back from our patients is one way we can continue to improve our services. Please take a few minutes to complete the written survey that you may receive in the mail after your visit with us. Thank you!             Your Updated Medication List - Protect others around you: Learn how to safely use, store and throw away your medicines at www.disposemymeds.org.          This list is accurate as of: 4/11/17  3:22 PM.  Always use your most recent med list.                   Brand Name Dispense Instructions for use    acetaminophen 650 MG 8 hour tablet     100 tablet    Take 650 mg by mouth every 6 hours as needed for mild pain       albuterol (5 MG/ML) 0.5% neb solution    PROVENTIL    60 vial    Take 0.5 mLs (2.5 mg) by nebulization every 4 hours as needed for wheezing or shortness of breath / dyspnea (dyspnea)       alum & mag hydroxide-simethicone 400-400-40 MG/5ML Susp suspension    MYLANTA ES/MAALOX  ES     Take 15-30 mLs by mouth every 4 hours as needed for indigestion       ASPIRIN NOT PRESCRIBED    INTENTIONAL    0 each    Antiplatelet medication not prescribed intentionally due to Current anticoagulant therapy (warfarin/enoxaparin)        atorvastatin 40 MG tablet    LIPITOR    90 tablet    Take 1 tablet (40 mg) by mouth At Bedtime       betamethasone valerate 0.1 % ointment    VALISONE    15 g    Apply topically daily as needed To inside of ears.       blood glucose monitoring test strip    no brand specified    3 Month    Use to test blood sugars 3 -4  times daily or as directed       budesonide 0.5 MG/2ML neb solution    PULMICORT    60 ampule    Take 2 mLs (0.5 mg) by nebulization 2 times daily as needed       calcitonin (salmon) 200 UNIT/ACT nasal spray    MIACALCIN    11.1 mL    SPRAY 1 SPRAY INTO ONE NOSTRIL DAILY ALTERNATE NOSTRIL EACH DAY, RIGHT NOSTRIL ON EVEN DAYS AND LEFT NOSTRIL ON ODD DAYS       carvedilol 6.25 MG tablet    COREG    180 tablet    Take 2 tablets (12.5 mg) by mouth 2 times daily (with meals)       CINNAMON PO      Take 1,000 mg by mouth daily       cod liver oil Caps capsule      Take 1 capsule by mouth daily       digoxin 125 MCG tablet    LANOXIN    90 tablet    Take 1 tablet (125 mcg) by mouth daily       fluticasone 50 MCG/ACT spray    FLONASE    3 Bottle    Spray 1-2 sprays into both nostrils daily       latanoprost 0.005 % ophthalmic solution    XALATAN     Place 1 drop into both eyes At Bedtime.       lisinopril 5 MG tablet    PRINIVIL/ZESTRIL    90 tablet    Take 1 tablet (5 mg) by mouth daily       meclizine 25 MG tablet    ANTIVERT    180 tablet    Take 1 tablet (25 mg) by mouth 3 times daily as needed for dizziness       nitroglycerin 0.4 MG sublingual tablet    NITROSTAT    30 tablet    If chest pain place under tongue call 911 if pain not resolved/ repeat every 5 min for total of 3 doses       NovoLIN MIX 70/30 VIAL injection   Generic drug:  insulin NPH-insulin regular     10 mL    Inject 30 Units Subcutaneous 2 times daily (with meals)       oxybutynin 5 MG tablet    DITROPAN    90 tablet    Take 1 tablet (5 mg) by mouth daily       pentoxifylline 400 MG CR tablet    TRENtal    90 tablet    Take 1 tablet  (400 mg) by mouth 3 times daily (with meals)       polyethylene glycol Packet    MIRALAX/GLYCOLAX     Take 1 packet by mouth 2 times daily as needed for constipation       potassium chloride SA 20 MEQ CR tablet    K-DUR/KLOR-CON M    180 tablet    Take 1 tablet (20 mEq) by mouth 2 times daily       torsemide 10 MG tablet    DEMADEX    180 tablet    Take 2 tablets (20 mg) by mouth At Bedtime       traMADol 50 MG tablet    ULTRAM    240 tablet    TAKE 1-2 TABLET BY MOUTH EVERY 6 HOURS AS NEEDED FOR PAIN       Vitamin B 12 100 MCG Lozg     100 lozenge    Take 1,000 mcg by mouth daily       vitamin D3 2000 UNITS Caps     90 capsule    Take 2,000 Units by mouth daily       * Warfarin Therapy Reminder      1 each continuous prn       * JANTOVEN 2.5 MG tablet   Generic drug:  warfarin     100 tablet    TK 3 TO 4 TS PO QD OR UTD BY INR CLINIC       * Notice:  This list has 2 medication(s) that are the same as other medications prescribed for you. Read the directions carefully, and ask your doctor or other care provider to review them with you.

## 2017-04-11 NOTE — MR AVS SNAPSHOT
Malgorzata Barrazalee ann   4/11/2017   Anticoagulation Therapy Visit    Description:  76 year old female   Provider:  Clair Michelle DO   Department:  [facility] Nurse           INR as of 4/11/2017     Today's INR 2.2      Anticoagulation Summary as of 4/11/2017     INR goal 2.0-3.0   Today's INR 2.2   Full instructions 5 mg every day   Next INR check 4/25/2017    Indications   Long-term (current) use of anticoagulants [Z79.01] [Z79.01]  Atrial fibrillation (H) [I48.91]         Contact Numbers     Clinic Number:         April 2017 Details    Sun Mon Tue Wed Thu Fri Sat           1                 2               3               4               5               6               7               8                 9               10               11      5 mg   See details      12      5 mg         13      5 mg         14      5 mg         15      5 mg           16      5 mg         17      5 mg         18      5 mg         19      5 mg         20      5 mg         21      5 mg         22      5 mg           23      5 mg         24      5 mg         25            26               27               28               29                 30                      Date Details   04/11 This INR check       Date of next INR:  4/25/2017         How to take your warfarin dose     To take:  5 mg Take 2 of the 2.5 mg tablets.

## 2017-04-11 NOTE — PROGRESS NOTES
ANTICOAGULATION FOLLOW-UP CLINIC VISIT    Patient Name:  Malgorzata Lan  Date:  4/11/2017  Contact Type:  Telephone/ Spoke with Malgorzata on the phone and provided dosing instructions. Recheck the week on 4/24/17.    SUBJECTIVE:     Patient Findings     Positives No Problem Findings    Comments Patient does [facility] INR Monitoring           OBJECTIVE    INR Protime   Date Value Ref Range Status   04/11/2017 2.2 (A) 0.86 - 1.14 Final       ASSESSMENT / PLAN  INR assessment THER    Recheck INR In: 2 WEEKS    INR Location Home INR      Anticoagulation Summary as of 4/11/2017     INR goal 2.0-3.0   Today's INR 2.2   Maintenance plan 5 mg (2.5 mg x 2) every day   Full instructions 5 mg every day   Weekly total 35 mg   No change documented Britt Jacobo RN   Plan last modified Bharati Saini RN (10/4/2016)   Next INR check 4/25/2017   Priority INR   Target end date     Indications   Long-term (current) use of anticoagulants [Z79.01] [Z79.01]  Atrial fibrillation (H) [I48.91]         Anticoagulation Episode Summary     INR check location Home Draw    Preferred lab     Send INR reminders to CS ANTICOAGULATION    Comments [facility] INR      Anticoagulation Care Providers     Provider Role Specialty Phone number    Derek Michelle Crow MD [facility] [phone]            See the Encounter Report to view Anticoagulation Flowsheet and Dosing Calendar (Go to Encounters tab in chart review, and find the Anticoagulation Therapy Visit)    Dosage adjustment made based on physician directed care plan.  No changes.    Britt Jacobo RN

## 2017-04-12 LAB
ALBUMIN SERPL-MCNC: 3.6 G/DL (ref 3.4–5)
ALP SERPL-CCNC: 103 U/L (ref 40–150)
ALT SERPL W P-5'-P-CCNC: 13 U/L (ref 0–50)
ANION GAP SERPL CALCULATED.3IONS-SCNC: 8 MMOL/L (ref 3–14)
AST SERPL W P-5'-P-CCNC: 19 U/L (ref 0–45)
BILIRUB SERPL-MCNC: 0.6 MG/DL (ref 0.2–1.3)
BUN SERPL-MCNC: 14 MG/DL (ref 7–30)
CALCIUM SERPL-MCNC: 9.8 MG/DL (ref 8.5–10.1)
CHLORIDE SERPL-SCNC: 105 MMOL/L (ref 94–109)
CO2 SERPL-SCNC: 26 MMOL/L (ref 20–32)
CREAT SERPL-MCNC: 1.02 MG/DL (ref 0.52–1.04)
DEPRECATED CALCIDIOL+CALCIFEROL SERPL-MC: 31 UG/L (ref 20–75)
GFR SERPL CREATININE-BSD FRML MDRD: 52 ML/MIN/1.7M2
GLUCOSE SERPL-MCNC: 133 MG/DL (ref 70–99)
POTASSIUM SERPL-SCNC: 4 MMOL/L (ref 3.4–5.3)
PROT SERPL-MCNC: 7.5 G/DL (ref 6.8–8.8)
SODIUM SERPL-SCNC: 139 MMOL/L (ref 133–144)

## 2017-04-13 ENCOUNTER — TRANSFERRED RECORDS (OUTPATIENT)
Dept: HEALTH INFORMATION MANAGEMENT | Facility: CLINIC | Age: 77
End: 2017-04-13

## 2017-04-13 LAB
CREAT SERPL-MCNC: 1.26 MG/DL (ref 0.6–0.93)
GFR SERPL CREATININE-BSD FRML MDRD: 41 ML/MIN/1.73M2
GLUCOSE SERPL-MCNC: 111 MG/DL (ref 65–99)
GLUCOSE SERPL-MCNC: 111 MG/DL (ref 65–99)
HBA1C MFR BLD: 6.6 % (ref 4–6)
POTASSIUM SERPL-SCNC: 4.1 MMOL/L (ref 3.5–5.3)

## 2017-04-19 ENCOUNTER — TELEPHONE (OUTPATIENT)
Dept: FAMILY MEDICINE | Facility: CLINIC | Age: 77
End: 2017-04-19

## 2017-04-19 DIAGNOSIS — Z53.9 DIAGNOSIS NOT YET DEFINED: Primary | ICD-10-CM

## 2017-04-19 PROCEDURE — G0180 MD CERTIFICATION HHA PATIENT: HCPCS | Performed by: INTERNAL MEDICINE

## 2017-04-19 NOTE — TELEPHONE ENCOUNTER
Reason for Call:  Home Health Care    Yosi with [facility] called regarding (reason for call): Verbal orders    Orders are needed for this patient. changes to wound   appearance and increase pain at wound site. Order for Verbal okay to collect wound culture     Phone Number Homecare Nurse can be reached at: [phone]    Can we leave a detailed message on this number? YES      Call taken on 4/19/2017 at 1:47 PM by Vanessa Núñez

## 2017-04-19 NOTE — TELEPHONE ENCOUNTER
Yosi [facility], informed of below recommendation.  He will talk with patient and advise.  Bharati Saini RN

## 2017-04-19 NOTE — TELEPHONE ENCOUNTER
I don't think a wound culture will be very helpful   If the question is whether the wound is infected, then a clinical exam would be necessary, office visit appointment or if symptoms are severe/ER or urgent care

## 2017-04-24 DIAGNOSIS — R42 VERTIGO: ICD-10-CM

## 2017-04-24 PROBLEM — M85.80 OSTEOPENIA: Chronic | Status: ACTIVE | Noted: 2017-04-11

## 2017-04-25 ENCOUNTER — TELEPHONE (OUTPATIENT)
Dept: FAMILY MEDICINE | Facility: CLINIC | Age: 77
End: 2017-04-25

## 2017-04-25 NOTE — TELEPHONE ENCOUNTER
Returned call to Yosi with [facility]. Given verbal OK for requested orders as noted below.  Orders will be faxed to PCP for review and signature.      Kristin Phillips RN

## 2017-04-25 NOTE — TELEPHONE ENCOUNTER
Pending Prescriptions:                       Disp   Refills    meclizine (ANTIVERT) 25 MG tablet [Pharma*180 ta*0            Sig: TAKE 1 TABLET(25 MG) BY MOUTH THREE TIMES DAILY           AS NEEDED FOR DIZZINESS          Last Written Prescription Date: 10/18/16  Last Fill Quantity: 180,  # refills: 1   Last Office Visit with [facility], [facility] or [facility] prescribing provider: 4/11/17                                         Next 5 appointments (look out 90 days)     Jun 07, 2017  2:45 PM CDT   Return Visit with Ed Shine MD   [facility] PHYSICIANS [facility] AT [place] ([facility])    [address] [place] [address] [phone]

## 2017-04-25 NOTE — TELEPHONE ENCOUNTER
Reason for Call: Request for an order or referral:    Order or referral being requested: home care order/ nursing 3 x a wk fro 4 1 x a wk for 1 3 as needed    Date needed: as soon as possible    Has the patient been seen by the PCP for this problem? YES    Additional comments:       Phone number Patient can be reached at:  Other phone number:  [phone]    Best Time:      Can we leave a detailed message on this number?  YES    Call taken on 4/25/2017 at 12:46 PM by Starla Akers

## 2017-04-26 ENCOUNTER — TELEPHONE (OUTPATIENT)
Dept: FAMILY MEDICINE | Facility: CLINIC | Age: 77
End: 2017-04-26

## 2017-04-26 RX ORDER — MECLIZINE HYDROCHLORIDE 25 MG/1
TABLET ORAL
Qty: 180 TABLET | Refills: 0 | Status: SHIPPED | OUTPATIENT
Start: 2017-04-26 | End: 2017-08-02

## 2017-04-26 NOTE — TELEPHONE ENCOUNTER
[facility]  She states that the wound has gotten larger with the previous therapy, was using Hydrofera blue.  The nurse states that there is quite a bit of drainage, doesn't seem infected. No fever.  Today the wound is measuring 2.5 cm by 3 cm.     Wants to use a silver foam dressing 3 times a week.     PCP: Given verbal ok for the change in wound care  Please advise if you would like something different done.    Thank you    Kristin Phillips RN

## 2017-04-26 NOTE — TELEPHONE ENCOUNTER
Returned call to eNisha with [facility] regarding the message below and  to get an update on the Patient's wound.   No answer, left message to call back.    Kristin Phillips RN

## 2017-04-26 NOTE — TELEPHONE ENCOUNTER
Prescription approved per [facility] Refill Protocol.  Yessy WALKER RN

## 2017-04-26 NOTE — TELEPHONE ENCOUNTER
Reason for Call:  Home Health Care    clifford with [facility] called regarding (reason for call): Wound care orders    Orders are needed for this patient.     Wound care silver foam dressing change 3x a week  And as needed    Pt Provider:     Phone Number Homecare Nurse can be reached at: [phone]    Can we leave a detailed message on this number? YES     Call taken on 4/26/2017 at 2:30 PM by Vanessa Núñez

## 2017-04-27 NOTE — TELEPHONE ENCOUNTER
Yes, ok with that change for now  Given that it is getting larger she should be seen in the wound clinic  I tried to place referral (pended) but I can't see Saskia listed anymore in the drop down box  Maybe since Dr. Gr does this is it under vascular referral?  Please have her get scheduled  If a long wait >a few weeks and if worsening in the meantime, then needs f/u in our clinic.

## 2017-04-27 NOTE — TELEPHONE ENCOUNTER
Detailed message left with information noted below from provider along with contact number to clinic for additional questions and if referral is needed to let us know where we can send the referral too with someone they work with at Home care.  Verona Staples RN  Triage [employer] Work[employer]'

## 2017-05-09 ENCOUNTER — HOSPITAL ENCOUNTER (OUTPATIENT)
Dept: BONE DENSITY | Facility: CLINIC | Age: 77
Discharge: HOME | End: 2017-05-09
Attending: INTERNAL MEDICINE | Admitting: INTERNAL MEDICINE
Payer: MEDICARE

## 2017-05-09 DIAGNOSIS — Z78.0 ASYMPTOMATIC POSTMENOPAUSAL STATUS: ICD-10-CM

## 2017-05-09 PROCEDURE — 77080 DXA BONE DENSITY AXIAL: CPT

## 2017-05-09 NOTE — LETTER
[facility]  [address] [facility]  [address]  [place]  [address]  Tel: [phone]    May 24, 2017    Malgorzata Lan  [address]  [place] [address]        Malgorzata,     Thank you for getting your bone density testing completed!   It does show slightly thinner bones than your last bone density consistent with osteopenia (mild bone thinning), which is not as advanced as osteoporosis (severe bone thinning).     However, the estimated 10-year fracture risk is high enough to consider a medication to strengthen your bones instead of the Calcitonin nasal spray. I'd suggest a medication called Fosamax (Alendronate). It is taken once weekly on an empty stomach with a full glass of water an hour before breakfast so that it is absorbed properly.     If you have any major dental work (not including a cleaning) planned, we should put starting this medication on hold for another time until that dental work is complete.     Please let me know if you'd be willing to start this medication and I can send this into your pharmacy. Thanks!       Sincerely,    Clair Michelle, DO/cw          Results for orders placed or performed during the hospital encounter of 05/09/17   DX Hip/Pelvis/Spine    Narrative    DXA BONE MINERAL DENSITY SCAN   5/9/2017 3:57 PM    TECHNIQUE: The lumbar spine and both hips were scanned with DXA  technique performed using a "[employer]" scanner. DXA results are  reported according to T-score. The T-score is the standard deviation  from the peak bone mass in a normal young adult patient. A T-score of  -1.0 to -2.5 correlates with osteopenia. A T-score of less than -2.5  correlates with osteoporosis.    In accordance with the ISCD (International Society of Clinical  Densitometry) the lowest BMD between the total hip and femoral neck  will be used.     All treatment decisions require clinical judgment and consideration of  individual patient factors which may not be captured in  the FRAX model  and the risk of fracture may be over- or under-estimated by FRAX.    INDICATION: Screening.    COMPARISON: 1/26/2012    FINDINGS:     Lumbar spine: The T-score is -0.4 from L1 to L4. There has been a 1.2  percent increase in bone density since the prior examination. This is  not statistically significant.    Hips: The left hip total T-score is -2.4. The right hip total T-score  is -1.8. Bone mineral density in the worst hip is 0.709 gm/cm2. There  has been a 4.2 percent decrease in bone density since the prior  examination. This is statistically significant.      Impression    IMPRESSION:  1. Advanced osteopenia of the left hip, moderate osteopenia of the  right hip, and normal bone mineral density of the lumbar spine.  2. The probability of major osteoporotic fracture is 16.2 percent and  probability of hip fracture is 3.2 percent within the next 10 years  according to FRAX risk assessment.    JOEL PANTOJA MD

## 2017-05-11 ENCOUNTER — ANTICOAGULATION THERAPY VISIT (OUTPATIENT)
Dept: FAMILY MEDICINE | Facility: CLINIC | Age: 77
End: 2017-05-11
Payer: MEDICARE

## 2017-05-11 DIAGNOSIS — Z79.01 LONG-TERM (CURRENT) USE OF ANTICOAGULANTS: ICD-10-CM

## 2017-05-11 DIAGNOSIS — I48.91 ATRIAL FIBRILLATION (H): ICD-10-CM

## 2017-05-11 DIAGNOSIS — I73.9 PERIPHERAL VASCULAR DISEASE (H): ICD-10-CM

## 2017-05-11 LAB — INR PPP: 3.3

## 2017-05-11 PROCEDURE — 99207 ZZC NO CHARGE NURSE ONLY: CPT | Performed by: INTERNAL MEDICINE

## 2017-05-11 RX ORDER — PENTOXIFYLLINE 400 MG/1
TABLET, EXTENDED RELEASE ORAL
Qty: 270 TABLET | Refills: 0 | Status: SHIPPED | OUTPATIENT
Start: 2017-05-11 | End: 2017-08-09

## 2017-05-11 NOTE — MR AVS SNAPSHOT
Malgorzata Lan   5/11/2017   Anticoagulation Therapy Visit    Description:  77 year old female   Provider:  Clair Michelle DO   Department:  [facility] Family Prac/Im           INR as of 5/11/2017     Today's INR 3.3!      Anticoagulation Summary as of 5/11/2017     INR goal 2.0-3.0   Today's INR 3.3!   Full instructions 5/11: 2.5 mg; Otherwise 5 mg every day   Next INR check 5/23/2017    Indications   Long-term (current) use of anticoagulants [Z79.01] [Z79.01]  Atrial fibrillation (H) [I48.91]         May 2017 Details    Sun Mon Tue Wed Thu Fri Sat      1               2               3               4               5               6                 7               8               9               10               11      2.5 mg   See details      12      5 mg         13      5 mg           14      5 mg         15      5 mg         16      5 mg         17      5 mg         18      5 mg         19      5 mg         20      5 mg           21      5 mg         22      5 mg         23            24               25               26               27                 28               29               30               31                   Date Details   05/11 This INR check       Date of next INR:  5/23/2017         How to take your warfarin dose     To take:  2.5 mg Take 1 of the 2.5 mg tablets.    To take:  5 mg Take 2 of the 2.5 mg tablets.

## 2017-05-11 NOTE — PROGRESS NOTES
ANTICOAGULATION FOLLOW-UP CLINIC VISIT    Patient Name:  Malgorzata Lan  Date:  5/11/2017  Contact Type:  FAX from [facility].  Telephoned patient.     SUBJECTIVE:     Patient Findings     Positives Unexplained INR or factor level change    Comments INR results received as fax from [facility] Home Monitoring:  3.3 on 5-9-17.   Called patient.   Reports increased pain recently, however patient thinks the pain will improve going forward due to home care.             OBJECTIVE    INR   Date Value Ref Range Status   05/11/2017 3.3  Final       ASSESSMENT / PLAN  INR assessment SUPRA    Recheck INR In: 2 WEEKS    INR Location Home INR      Anticoagulation Summary as of 5/11/2017     INR goal 2.0-3.0   Today's INR 3.3!   Maintenance plan 5 mg (2.5 mg x 2) every day   Full instructions 5/11: 2.5 mg; Otherwise 5 mg every day   Weekly total 35 mg   Plan last modified Bharati Saini RN (10/4/2016)   Next INR check 5/23/2017   Priority INR   Target end date     Indications   Long-term (current) use of anticoagulants [Z79.01] [Z79.01]  Atrial fibrillation (H) [I48.91]         Anticoagulation Episode Summary     INR check location Home Draw    Preferred lab     Send INR reminders to CS ANTICOAGULATION    Comments [facility]ERE Home INR      Anticoagulation Care Providers     Provider Role Specialty Phone number    ReedMichelle MD [facility] [phone]            See the Encounter Report to view Anticoagulation Flowsheet and Dosing Calendar (Go to Encounters tab in chart review, and find the Anticoagulation Therapy Visit)    Dosage adjustment made based on physician directed care plan.    Jen Lowry RN

## 2017-05-11 NOTE — TELEPHONE ENCOUNTER
pentoxifylline (TRENTAL) 400 MG CR tablet 90 tablet 0 3/10/2017  No   Sig: Take 1 tablet (400 mg) by mouth 3 times daily (with meals)                Last Written Prescription Date: 03/10/2017  Last Fill Quantity: 90, # refills: 0    [facility] Office Visit with [facility] or [facility] prescribing provider:  04/11/2017   Future Office Visit:    Next 5 appointments (look out 90 days)     Jun 07, 2017  2:45 PM CDT   Return Visit with Ed Shine MD   [facility] ([facility])    [address] [place] [id number]   [phone]                   Lab Results   Component Value Date    WBC 3.0 04/11/2017     Lab Results   Component Value Date    RBC 3.46 04/11/2017     Lab Results   Component Value Date    HGB 10.9 04/11/2017     Lab Results   Component Value Date    HCT 33.4 04/11/2017     No components found for: MCT  Lab Results   Component Value Date    MCV 97 04/11/2017     Lab Results   Component Value Date    MCH 31.5 04/11/2017     Lab Results   Component Value Date    MCHC 32.6 04/11/2017     Lab Results   Component Value Date    RDW 14.7 04/11/2017     Lab Results   Component Value Date    PLT 96 04/11/2017     Lab Results   Component Value Date    AST 19 04/11/2017     Lab Results   Component Value Date    ALT 13 04/11/2017     Creatinine   Date Value Ref Range Status   04/11/2017 1.02 0.52 - 1.04 mg/dL Final   ]

## 2017-05-12 NOTE — TELEPHONE ENCOUNTER
Prescription approved per [facility] Refill Protocol.  Bharati Saini RN

## 2017-05-22 ENCOUNTER — TELEPHONE (OUTPATIENT)
Dept: FAMILY MEDICINE | Facility: CLINIC | Age: 77
End: 2017-05-22

## 2017-05-22 NOTE — TELEPHONE ENCOUNTER
Called and Left detailed message with VO for below. Advised callback if needed.     Pamela Bosch RN

## 2017-05-22 NOTE — TELEPHONE ENCOUNTER
Reason for Call:  Home Health Care    Yosi with [facility] called regarding (reason for call): Orders    Orders are needed for this patient. Skilled Nursing    PT: n/a    OT: n/a    Skilled Nursing: 3 times a week for 8 weeks, 2 times a week for 1 week, and 3 PRN's.    Pt Provider: Dr. Michelle    Phone Number Homecare Nurse can be reached at: [phone]    Can we leave a detailed message on this number? YES

## 2017-05-23 DIAGNOSIS — S81.802D NON-HEALING WOUND OF LOWER EXTREMITY, LEFT, SUBSEQUENT ENCOUNTER: ICD-10-CM

## 2017-05-23 DIAGNOSIS — S81.809A NON-HEALING WOUND OF LOWER EXTREMITY: Primary | ICD-10-CM

## 2017-05-23 LAB — INR PPP: 2.3

## 2017-05-23 NOTE — TELEPHONE ENCOUNTER
Requests Lidocaine gel for dressing change lower extremity.    Currently using an old supply of liquid Lidocaine topically for her stasis ulcers (dressing changes) 3 times weekly, performed by home care.    Yosi RN, feels she needs something ordered to manage pain during dressings. Not sure who originally ordered this, but possibly hospital or wound clinic. He believes the strength is 4 %, but not certain.  Pharmacy confirmed.  Rx pended, unless there is a different option that PCP prefers.   We will need to contact nurse or patient so this can get picked up at pharmacy.    Thank you,  Marifer Zaragoza RN

## 2017-05-24 ENCOUNTER — ANTICOAGULATION THERAPY VISIT (OUTPATIENT)
Dept: NURSING | Facility: CLINIC | Age: 77
End: 2017-05-24
Payer: MEDICARE

## 2017-05-24 DIAGNOSIS — Z79.01 LONG-TERM (CURRENT) USE OF ANTICOAGULANTS: ICD-10-CM

## 2017-05-24 DIAGNOSIS — I48.91 ATRIAL FIBRILLATION (H): ICD-10-CM

## 2017-05-24 PROCEDURE — 99207 ZZC NO CHARGE NURSE ONLY: CPT | Performed by: INTERNAL MEDICINE

## 2017-05-24 NOTE — MR AVS SNAPSHOT
Malgorzata Lan   5/24/2017   Anticoagulation Therapy Visit    Description:  77 year old female   Provider:  Clair Michelle DO   Department:  [facility] Nurse           INR as of 5/24/2017     Today's INR 2.3 (5/23/2017)      Anticoagulation Summary as of 5/24/2017     INR goal 2.0-3.0   Today's INR 2.3 (5/23/2017)   Full instructions 5 mg every day   Next INR check 6/6/2017    Indications   Long-term (current) use of anticoagulants [Z79.01] [Z79.01]  Atrial fibrillation (H) [I48.91]         Description     Received fax from [employer] dated 5/23.  Called to patient, no answer, left detailed message to continue same.  Recheck 2 weeks.      Contact Numbers     Clinic Number:         May 2017 Details    Sun Mon Tue Wed Thu Fri Sat      1               2               3               4               5               6                 7               8               9               10               11               12               13                 14               15               16               17               18               19               20                 21               22               23               24      5 mg   See details      25      5 mg         26      5 mg         27      5 mg           28      5 mg         29      5 mg         30      5 mg         31      5 mg             Date Details   05/24 This INR check               How to take your warfarin dose     To take:  5 mg Take 2 of the 2.5 mg tablets.           June 2017 Details    Sun Mon Tue Wed Thu Fri Sat         1      5 mg         2      5 mg         3      5 mg           4      5 mg         5      5 mg         6            7               8               9               10                 11               12               13               14               15               16               17                 18               19               20               21               22               23               24                 25                26               27               28               29               30                 Date Details   No additional details    Date of next INR:  6/6/2017         How to take your warfarin dose     To take:  5 mg Take 2 of the 2.5 mg tablets.

## 2017-05-24 NOTE — PROGRESS NOTES
ANTICOAGULATION FOLLOW-UP CLINIC VISIT    Patient Name:  Malgorzata Lan  Date:  5/24/2017  Contact Type:  Telephone/ Verbal orders given to patient at [phone]    SUBJECTIVE:     Patient Findings     Positives No Problem Findings    Comments Received fax from [facility] dated 5/23.  Called to patient, no answer, left detailed message to continue same.  Recheck 2 weeks.           OBJECTIVE    INR   Date Value Ref Range Status   05/23/2017 2.3  Final       ASSESSMENT / PLAN  INR assessment THER    Recheck INR In: 2 WEEKS    INR Location Home INR      Anticoagulation Summary as of 5/24/2017     INR goal 2.0-3.0   Today's INR 2.3 (5/23/2017)   Maintenance plan 5 mg (2.5 mg x 2) every day   Full instructions 5 mg every day   Weekly total 35 mg   No change documented Olive Reyes RN   Plan last modified Bharati Saini RN (10/4/2016)   Next INR check 6/6/2017   Priority INR   Target end date     Indications   Long-term (current) use of anticoagulants [Z79.01] [Z79.01]  Atrial fibrillation (H) [I48.91]         Anticoagulation Episode Summary     INR check location Home Draw    Preferred lab     Send INR reminders to CS ANTICOAGULATION    Comments [facility] Home INR      Anticoagulation Care Providers     Provider Role Specialty Phone number    Michelle Reed MD [facility] [phone]            See the Encounter Report to view Anticoagulation Flowsheet and Dosing Calendar (Go to Encounters tab in chart review, and find the Anticoagulation Therapy Visit)    Dosage adjustment made based on physician directed care plan.    Olive Reyes, RN

## 2017-05-24 NOTE — PROGRESS NOTES
Please send a copy of their test results and a letter to the patient as follows. Thanks!  Malgorzata,  Thank you for getting your bone density testing completed!  It does show slightly thinner bones than your last bone density consistent with osteopenia (mild bone thinning), which is not as advanced as osteoporosis (severe bone thinning).  However, the estimated 10-year fracture risk is high enough to consider a medication to strengthen your bones instead of the Calcitonin nasal spray. I'd suggest a medication called Fosamax (Alendronate). It is taken once weekly on an empty stomach with a full glass of water an hour before breakfast so that it is absorbed properly. If you have any major dental work (not including a cleaning) planned, we should put starting this medication on hold for another time until that dental work is complete. Please let me know if you'd be willing to start this medication and I can send this into your pharmacy. Thanks!

## 2017-05-31 NOTE — TELEPHONE ENCOUNTER
Reason for Call: Request for an order or referral:    Order or referral being requested:  Wound care   Date needed: as soon as possible    Has the patient been seen by the PCP for this problem? NO    Additional comments:  New order for wound care and a home health aide for bathing.      Phone number Patient can be reached at:  Other phone number:  [phone]  Best Time:  anytime  Can we leave a detailed message on this number?  YES    Call taken on 5/31/2017 at 3:49 PM by Susie Lee

## 2017-05-31 NOTE — TELEPHONE ENCOUNTER
I called Home Care RN and provided verbal order for below:   1.) Endoform, Hydrofera Blue Bacteriostatic Foam and dressing changes every other day.   2.) HHA for bathing 2 times weekly    Provided verbal order for above. Nothing more needed at this time.     FYI to PCP regarding new orders for wound care.     Britt Jacobo RN

## 2017-06-01 DIAGNOSIS — I48.91 ATRIAL FIBRILLATION (H): ICD-10-CM

## 2017-06-01 DIAGNOSIS — G89.4 CHRONIC PAIN SYNDROME: ICD-10-CM

## 2017-06-01 NOTE — TELEPHONE ENCOUNTER
JANTOVEN 2.5 MG tablet 100 tablet 3 8/26/2016       Last Written Prescription Date: 08/26/2016  Last Fill Qty: 100, # refills: 3  Last Office Visit with [facility], [facility] or [facility] prescribing provider: 04/11/2017  Next 5 appointments (look out 90 days)     Jun 07, 2017  2:45 PM CDT   Return Visit with Ed Shine MD   [facility] ([facility])    [address] [place] [id number] [phone]                   Date and Result of Last PT/INR:   Lab Results   Component Value Date    INR 2.3 05/23/2017    INR 3.3 05/11/2017

## 2017-06-02 RX ORDER — WARFARIN SODIUM 2.5 MG/1
TABLET ORAL
Qty: 180 TABLET | Refills: 0 | Status: SHIPPED | OUTPATIENT
Start: 2017-06-02 | End: 2017-06-07

## 2017-06-02 RX ORDER — TRAMADOL HYDROCHLORIDE 50 MG/1
TABLET ORAL
Qty: 240 TABLET | Refills: 1 | Status: SHIPPED | OUTPATIENT
Start: 2017-06-02 | End: 2017-09-15

## 2017-06-02 NOTE — TELEPHONE ENCOUNTER
Pending Prescriptions:                       Disp   Refills    traMADol (ULTRAM) 50 MG tablet            240 ta*1            Sig: TAKE 1-2 TABLET BY MOUTH EVERY 6 HOURS AS NEEDED           FOR PAIN          Last Written Prescription Date:  1/12/17  Last Fill Quantity: 240,   # refills: 1  Last Office Visit with [facility] or  Health prescribing provider: 4/11/17 Miguel Angel  Future Office visit:    Next 5 appointments (look out 90 days)     Jun 07, 2017  2:45 PM CDT   Return Visit with Ed Shine MD   [facility] ([facility])    [address] [place] [id number] [phone]                   Routing refill request to provider for review/approval because:  Drug not on the [facility], [facility] or  [email or web address] refill protocol or controlled substance    Lena Garibay, RT(R)

## 2017-06-02 NOTE — TELEPHONE ENCOUNTER
Prescription approved per [facility] Refill Protocol.  Leni Renae RN

## 2017-06-06 ENCOUNTER — TELEPHONE (OUTPATIENT)
Dept: FAMILY MEDICINE | Facility: CLINIC | Age: 77
End: 2017-06-06

## 2017-06-06 ENCOUNTER — ANTICOAGULATION THERAPY VISIT (OUTPATIENT)
Dept: FAMILY MEDICINE | Facility: CLINIC | Age: 77
End: 2017-06-06
Payer: MEDICARE

## 2017-06-06 DIAGNOSIS — Z79.01 LONG-TERM (CURRENT) USE OF ANTICOAGULANTS: ICD-10-CM

## 2017-06-06 DIAGNOSIS — I48.91 ATRIAL FIBRILLATION (H): ICD-10-CM

## 2017-06-06 LAB — INR PPP: 3.3

## 2017-06-06 PROCEDURE — 99207 ZZC NO CHARGE NURSE ONLY: CPT | Performed by: INTERNAL MEDICINE

## 2017-06-06 NOTE — PROGRESS NOTES
ANTICOAGULATION FOLLOW-UP CLINIC VISIT    Patient Name:  Malgorzata Lan  Date:  6/6/2017  Contact Type:  Telephone    SUBJECTIVE:     Patient Findings     Positives Inflammation    Comments Increased pain           OBJECTIVE    INR   Date Value Ref Range Status   06/06/2017 3.3  Final       ASSESSMENT / PLAN  INR assessment SUPRA    Recheck INR In: 2 WEEKS    INR Location Home INR      Anticoagulation Summary as of 6/6/2017     INR goal 2.0-3.0   Today's INR 3.3!   Maintenance plan 5 mg (2.5 mg x 2) every day   Full instructions 6/6: 2.5 mg; Otherwise 5 mg every day   Weekly total 35 mg   Plan last modified Bharati Saini RN (10/4/2016)   Next INR check 6/19/2017   Priority INR   Target end date     Indications   Long-term (current) use of anticoagulants [Z79.01] [Z79.01]  Atrial fibrillation (H) [I48.91]         Anticoagulation Episode Summary     INR check location Home Draw    Preferred lab     Send INR reminders to CS ANTICOAGULATION    Comments [facility] Home INR      Anticoagulation Care Providers     Provider Role Specialty Phone number    Derek Michelle Crow MD [facility] [phone]            See the Encounter Report to view Anticoagulation Flowsheet and Dosing Calendar (Go to Encounters tab in chart review, and find the Anticoagulation Therapy Visit)    Dosage adjustment made based on physician directed care plan. Fax from [facility]. INR 3.3 done 6/5/17.  Reached patient who states she has some increased pain. Home nurse with patient at this time.  Advised 2.5 mg today only, then resume 5 mg daily and recheck in about 10 days.    Marifer Zaragoza RN

## 2017-06-06 NOTE — TELEPHONE ENCOUNTER
Please advise to request to change wound dressing as noted below.  Verona Staples RN  [employer]

## 2017-06-06 NOTE — MR AVS SNAPSHOT
Malgorzata Lan   6/6/2017   Anticoagulation Therapy Visit    Description:  77 year old female   Provider:  Clair Michelle DO   Department:  [facility]           INR as of 6/6/2017     Today's INR 3.3!      Anticoagulation Summary as of 6/6/2017     INR goal 2.0-3.0   Today's INR 3.3!   Full instructions 6/6: 2.5 mg; Otherwise 5 mg every day   Next INR check 6/19/2017    Indications   Long-term (current) use of anticoagulants [Z79.01] [Z79.01]  Atrial fibrillation (H) [I48.91]         June 2017 Details    Sun Mon Tue Wed Thu Fri Sat         1               2               3                 4               5               6      2.5 mg   See details      7      5 mg         8      5 mg         9      5 mg         10      5 mg           11      5 mg         12      5 mg         13      5 mg         14      5 mg         15      5 mg         16      5 mg         17      5 mg           18      5 mg         19            20               21               22               23               24                 25               26               27               28               29               30                 Date Details   06/06 This INR check       Date of next INR:  6/19/2017         How to take your warfarin dose     To take:  2.5 mg Take 1 of the 2.5 mg tablets.    To take:  5 mg Take 2 of the 2.5 mg tablets.

## 2017-06-06 NOTE — TELEPHONE ENCOUNTER
Reason for Call: Request for an order or referral:    Order or referral being requested: updated wound care recommendations     Date needed: as soon as possible    Has the patient been seen by the PCP for this problem? YES    Additional comments: RN recommends, 2 left venous wounds aquacel AG foam dressing curlics changed every other day     Phone number Patient can be reached at:  MORRIS Driver [facility] [phone]    Best Time:  Anytime     Can we leave a detailed message on this number?  YES    Call taken on 6/6/2017 at 12:22 PM by Endy Monroy

## 2017-06-07 ENCOUNTER — OFFICE VISIT (OUTPATIENT)
Dept: CARDIOLOGY | Facility: CLINIC | Age: 77
End: 2017-06-07
Attending: INTERNAL MEDICINE
Payer: MEDICARE

## 2017-06-07 ENCOUNTER — TELEPHONE (OUTPATIENT)
Dept: FAMILY MEDICINE | Facility: CLINIC | Age: 77
End: 2017-06-07

## 2017-06-07 VITALS
SYSTOLIC BLOOD PRESSURE: 110 MMHG | WEIGHT: 272.7 LBS | HEIGHT: 65 IN | DIASTOLIC BLOOD PRESSURE: 58 MMHG | BODY MASS INDEX: 45.43 KG/M2 | HEART RATE: 82 BPM

## 2017-06-07 DIAGNOSIS — I48.20 CHRONIC ATRIAL FIBRILLATION (H): ICD-10-CM

## 2017-06-07 DIAGNOSIS — I10 ESSENTIAL HYPERTENSION: ICD-10-CM

## 2017-06-07 DIAGNOSIS — Z95.0 CARDIAC PACEMAKER IN SITU: ICD-10-CM

## 2017-06-07 DIAGNOSIS — E78.2 MIXED HYPERLIPIDEMIA: ICD-10-CM

## 2017-06-07 DIAGNOSIS — I42.9 CARDIOMYOPATHY (H): ICD-10-CM

## 2017-06-07 DIAGNOSIS — I35.9 AORTIC VALVE DISEASE: ICD-10-CM

## 2017-06-07 PROCEDURE — 99214 OFFICE O/P EST MOD 30 MIN: CPT | Performed by: INTERNAL MEDICINE

## 2017-06-07 NOTE — PROGRESS NOTES
HPI and Plan:   See dictation    Interpretation Summary - echo on 9-:     There is mild to moderate concentric left ventricular hypertrophy.  The visual ejection fraction is estimated at 50-55%.  There is a catheter/pacemaker lead seen in the right ventricle.  The left atrium is moderately dilated.  There is mild (1+) aortic regurgitation.  Mild valvular aortic stenosis.  The rhythm was atrial fibrillation.  AS appears less severe compared with previous studies.      No orders of the defined types were placed in this encounter.    No orders of the defined types were placed in this encounter.    Medications Discontinued During This Encounter   Medication Reason     JANTOVEN 2.5 MG tablet      Warfarin Therapy Reminder          Encounter Diagnoses   Name Primary?     Cardiac pacemaker in situ      Cardiomyopathy (H)      Chronic atrial fibrillation (H)      Essential hypertension      Aortic valve disease      Mixed hyperlipidemia        CURRENT MEDICATIONS:  Current Outpatient Prescriptions   Medication Sig Dispense Refill     traMADol (ULTRAM) 50 MG tablet TAKE 1-2 TABLET BY MOUTH EVERY 6 HOURS AS NEEDED FOR PAIN 240 tablet 1     Lidocaine 4 % GEL Externally apply topically every 3 days Prior to dressing changes. 30 g 2     pentoxifylline (TRENTAL) 400 MG CR tablet TAKE 1 TABLET(400 MG) BY MOUTH THREE TIMES DAILY WITH MEALS 270 tablet 0     meclizine (ANTIVERT) 25 MG tablet TAKE 1 TABLET(25 MG) BY MOUTH THREE TIMES DAILY AS NEEDED FOR DIZZINESS 180 tablet 0     betamethasone valerate (VALISONE) 0.1 % ointment Apply topically daily as needed To inside of ears. 15 g 2     oxybutynin (DITROPAN) 5 MG tablet Take 1 tablet (5 mg) by mouth daily 90 tablet 1     digoxin (LANOXIN) 125 MCG tablet Take 1 tablet (125 mcg) by mouth daily 90 tablet 3     calcitonin, salmon, (MIACALCIN) 200 UNIT/ACT nasal spray SPRAY 1 SPRAY INTO ONE NOSTRIL DAILY ALTERNATE NOSTRIL EACH DAY, RIGHT NOSTRIL ON EVEN DAYS AND LEFT NOSTRIL ON  ODD DAYS 11.1 mL 3     lisinopril (PRINIVIL/ZESTRIL) 5 MG tablet Take 1 tablet (5 mg) by mouth daily 90 tablet 0     potassium chloride SA (K-DUR/KLOR-CON M) 20 MEQ CR tablet Take 1 tablet (20 mEq) by mouth 2 times daily 180 tablet 0     torsemide (DEMADEX) 10 MG tablet Take 2 tablets (20 mg) by mouth At Bedtime 180 tablet 3     carvedilol (COREG) 6.25 MG tablet Take 2 tablets (12.5 mg) by mouth 2 times daily (with meals) 180 tablet 1     blood glucose monitoring (NO BRAND SPECIFIED) test strip Use to test blood sugars 3 -4  times daily or as directed 3 Month 3     ASPIRIN NOT PRESCRIBED (INTENTIONAL) Antiplatelet medication not prescribed intentionally due to Current anticoagulant therapy (warfarin/enoxaparin) 0 each 0     fluticasone (FLONASE) 50 MCG/ACT nasal spray Spray 1-2 sprays into both nostrils daily 3 Bottle 3     albuterol (PROVENTIL) (5 MG/ML) 0.5% nebulizer solution Take 0.5 mLs (2.5 mg) by nebulization every 4 hours as needed for wheezing or shortness of breath / dyspnea (dyspnea) 60 vial 1     atorvastatin (LIPITOR) 40 MG tablet Take 1 tablet (40 mg) by mouth At Bedtime 90 tablet 3     budesonide (PULMICORT) 0.5 MG/2ML nebulizer solution Take 2 mLs (0.5 mg) by nebulization 2 times daily as needed 60 ampule 1     Cyanocobalamin (VITAMIN B 12) 100 MCG LOZG Take 1,000 mcg by mouth daily 100 lozenge 1     Cholecalciferol (VITAMIN D3) 2000 UNITS CAPS Take 2,000 Units by mouth daily 90 capsule 1     NOVOLIN MIX 70/30 VIAL (70-30) 100 UNIT/ML susp Inject 30 Units Subcutaneous 2 times daily (with meals) 10 mL 11     nitroglycerin (NITROSTAT) 0.4 MG SL tablet If chest pain place under tongue call 911 if pain not resolved/ repeat every 5 min for total of 3 doses 30 tablet 3     JANTOVEN 2.5 MG tablet TK 3 TO 4 TS PO QD OR UTD BY INR CLINIC 100 tablet 3     acetaminophen 650 MG 8 hour tablet Take 650 mg by mouth every 6 hours as needed for mild pain 100 tablet      alum & mag hydroxide-simethicone (MYLANTA  ES/MAALOX  ES) 400-400-40 MG/5ML SUSP Take 15-30 mLs by mouth every 4 hours as needed for indigestion  0     Cod Liver Oil CAPS Take 1 capsule by mouth daily       polyethylene glycol (MIRALAX/GLYCOLAX) packet Take 1 packet by mouth 2 times daily as needed for constipation        CINNAMON PO Take 1,000 mg by mouth daily       latanoprost (XALATAN) 0.005 % ophthalmic solution Place 1 drop into both eyes At Bedtime.       [DISCONTINUED] JANTOVEN 2.5 MG tablet TAKE 3 TO 4 TABLETS BY MOUTH EVERY DAY OR AS DIRECTED BY INR CLINIC 180 tablet 0     [DISCONTINUED] Warfarin Therapy Reminder 1 each continuous prn         ALLERGIES     Allergies   Allergen Reactions     Food Anaphylaxis     With green peppers     Codeine Sulfate Swelling     Throats swell     Flagyl [Metronidazole Hcl] Swelling     Hydrocodone Visual Disturbance     Pantoprazole Unknown     Penicillins Unknown     Hasn't had it for 30 years     Diltiazem Rash       PAST MEDICAL HISTORY:  Past Medical History:   Diagnosis Date     Aortic stenosis, moderate     on echo 3-14      Chronic leg pain      Congestive heart failure, unspecified     worsened during hospitalization/ diastolic failure  echo 3-2014 EF 55-60%/      COPD (chronic obstructive pulmonary disease) (H)      Depression      Diabetes mellitus (H)      Diastolic dysfunction      DJD (degenerative joint disease)      Glaucoma      Hyperlipidemia      Hyperparathyroidism (H)      Hypertension      Influenza A with pneumonia 3-2014    hospitalized      Lung cancer (H)      Morbid obesity (H)      Obstructive sleep apnea     wears CPAP     Pancytopenia (H)      Peripheral vascular disease (H)      Persistent atrial fibrillation (H) 06/08/2004     Pickwickian syndrome (H)      Renal insufficiency      Respiratory failure (H) 3-2014    hospitalized     Sinus node dysfunction (H)     TACHYBRADY SYNDROME s/p PPM     Squamous cell carcinoma of lung, stage I (H)      Syncope        PAST SURGICAL HISTORY:  Past  Surgical History:   Procedure Laterality Date     APPLY WOUND VAC Right 8/4/2015    Procedure: APPLY WOUND VAC;  Surgeon: Yandel Fair MD;  Location:  [facility]     BONE MARROW BIOPSY, BONE SPECIMEN, NEEDLE/TROCAR  10/30/2013    Procedure: BIOPSY BONE MARROW;  BONE MARROW BIOPSY ;  Surgeon: Kimani Lobo MD;  Location:  GI     CHOLECYSTECTOMY       DAVINCI HYSTERECTOMY TOTAL, BILATERAL SALPINGO-OOPHORECTOMY, NODE DISSECTION, COMBINED N/A 3/30/2016    Procedure: COMBINED DAVINCI HYSTERECTOMY TOTAL, SALPINGO-OOPHORECTOMY, NODE DISSECTION;  Surgeon: Kristin Arriaza MD;  Location:  OR     DILATION AND CURETTAGE N/A 3/18/2016    Procedure: DILATION AND CURETTAGE;  Surgeon: Gilmer Cruz MD;  Location:  OR     GRAFT SKIN SPLIT THICKNESS FROM EXTREMITY Right 8/4/2015    Procedure: GRAFT SKIN SPLIT THICKNESS FROM EXTREMITY;  Surgeon: Yandel Fair MD;  Location:  [facility]     IMPLANT PACEMAKER  7/31/2013    Single chamber Merom Sci PPM      IRRIGATION AND DEBRIDEMENT LOWER EXTREMITY, COMBINED Right 7/10/2015    Procedure: COMBINED IRRIGATION AND DEBRIDEMENT LOWER EXTREMITY;  Surgeon: Yandel Fair MD;  Location:  OR     ORTHOPEDIC SURGERY      t oes     SINUS SURGERY       THORACOSCOPIC WEDGE RESECTION LUNG Right 3/17/2015    Procedure: THORACOSCOPIC WEDGE RESECTION LUNG;  Surgeon: Santos Crenshaw MD;  Location:  OR     tonsils         FAMILY HISTORY:  Family History   Problem Relation Age of Onset     CANCER Mother 83     Lung cancer     GASTROINTESTINAL DISEASE Father 69     Ulcer bleed     HEART DISEASE Brother 58     CANCER Sister 60     Lung cancer     CANCER Sister 58     Lung cancer       SOCIAL HISTORY:  Social History     Social History     Marital status:      Spouse name: N/A     Number of children: N/A     Years of education: N/A     Social History Main Topics     Smoking status: Former Smoker     Packs/day: 1.00     Years: 30.00     Types:  Cigarettes     Quit date: 8/14/1993     Smokeless tobacco: Never Used     Alcohol use 0.0 oz/week     0 Standard drinks or equivalent per week      Comment: 2 a week      Drug use: No     Sexual activity: Not Currently     Partners: Male     Other Topics Concern     Parent/Sibling W/ Cabg, Mi Or Angioplasty Before 65f 55m? No     Caffeine Concern Yes     3-4 cups of coffee     Special Diet No     Exercise No     Social History Narrative        _______        Malgorzata Lan  MRN# [id number]     YOB: 1940  Age: 75 year old         Date of Admission: 8/4/2015    Date of Discharge: 8/5/2015    Admitting Physician: Yandel Fair MD    Discharging Service: [facility] General Surgery     Primary Provider: Michelle Reed (General)        Discharge Diagnosis:     Principle Diagnosis:    right calf wound    Wound, open    Calf ulcer, right, with unspecified severity        Brief HPI: Malgorzata Lan is a 75 year old year-old female who presented to the [facility] for an elective split thickness skin grafting. She was being followed closely as an outpatient in wound clinic by Dr. Fair. She recently developed a significant ulceration in her right anterior lateral calf. Operative debridement was undertaken previously with good results with plans for eventual STSG which was undertaken 8/5/15 without complications.    Hospital Course: Malgorzata Lan underwent the above named procedure without complications. Please see op note for further details. The patient had a routine hospital course and recovered as anticipated. By POD #1, she had advanced to a regular diet and was transitioned successfully to oral pain medications. She was deemed ready for discharge on POD #1. She will follow-up in wound clinic on Monday 8/10/15 as previously scheduled for wound check.         Inpatient Consultations: No consultations were requested during this admission        Procedures:     Split  "thickness skin grafting        Labs/Imaging:     Results for orders placed or performed during the hospital encounter of 08/04/15 (from the past 24 hour(s))     Potassium     Result  Value  Ref Range      Potassium  4.2  3.4 - 5.3 mmol/L     INR     Result  Value  Ref Range      INR  2.84 (H)  0.86 - 1.14     Hemoglobin     Result  Value  Ref Range      Hemoglobin  9.0 (L)  11.7 - 15.7 g/dL     Glucose by meter     Result  Value  Ref Range      Glucose  183 (H)  70 - 99 mg/dL     Glucose by meter     Result  Value  Ref Range      Glucose  154 (H)  70 - 99 mg/dL     Glucose by meter     Result  Value  Ref Range      Glucose  142 (H)  70 - 99 mg/dL     Glucose by meter     Result  Value  Ref Range      Glucose  126 (H)  70 - 99 mg/dL     Glucose by meter     Result  Value  Ref Range      Glucose  118 (H)  70 - 99 mg/dL     INR     Result  Value  Ref Range      INR  3.89 (H)  0.86 - 1.14             Disposition:     Discharged to home         Discharge Condition    Discharge condition:  Stable     Discharge vitals:  Blood pressure 112/59, pulse 83, temperature 97.8  F (36.6  C), temperature source Oral, resp. rate 16, height 1.651 m (5' 5\"), weight 134.718 kg (297 lb), SpO2 100 %, not currently breastfeeding.         Discharge Medications:     Current Discharge Medication List         START taking these medications      Details     !! oxyCODONE-acetaminophen (PERCOCET) 5-325 MG per tablet  Take 1-2 tablets by mouth every 6 hours as needed for moderate to severe pain    Qty: 20 tablet, Refills: 0      Associated Diagnoses: Acute post-operative pain         !! - Potential duplicate medications found. Please discuss with provider.         CONTINUE these medications which have NOT CHANGED      Details     !! oxyCODONE-acetaminophen (PERCOCET) 5-325 MG per tablet  Take 1 tablet by mouth every 6 hours as needed for moderate to severe pain    Qty: 120 tablet, Refills: 0      Associated Diagnoses: Chronic pain         insulin " NPH-insulin regular (NOVOLIN MIX 70/30 VIAL) VIAL 100 UNITS/ML susp  20 units before breakfast 10 units before dinner    Qty: 3 Month, Refills: 3      Associated Diagnoses: Diabetes mellitus, type 2         pentoxifylline (TRENTAL) 400 MG CR tablet  Take 1 tablet (400 mg) by mouth 3 times daily (with meals) Office visit due for additional refills. Call [phone] to schedule.    Qty: 90 tablet, Refills: 0      Associated Diagnoses: Cramp of limb         torsemide (DEMADEX) 10 MG tablet  Take 2 daily may on occasion double to 4 a day prn edema    Qty: 270 tablet, Refills: 1      Associated Diagnoses: Cardiomyopathy         fluticasone (FLONASE) 50 MCG/ACT nasal spray  Spray 1-2 sprays into both nostrils daily    Qty: 1 Package, Refills: 11      Associated Diagnoses: Right chronic serous otitis media         atorvastatin (LIPITOR) 40 MG tablet  Take 1 tablet (40 mg) by mouth daily Office visit with fasting labs due for additional refills. Call [phone] to schedule.    Qty: 30 tablet, Refills: 0      Associated Diagnoses: Other and unspecified hyperlipidemia         traMADol (ULTRAM) 50 MG tablet  Take 1 tablet (50 mg) by mouth every 6 hours as needed for moderate pain    Qty: 120 tablet, Refills: 3      Associated Diagnoses: Peripheral vascular disease         polyethylene glycol (MIRALAX/GLYCOLAX) packet  Take 1 packet by mouth daily as needed         lisinopril (PRINIVIL,ZESTRIL) 10 MG tablet  Take 0.5 tablets (5 mg) by mouth daily    Qty: 90 tablet, Refills: 3      Associated Diagnoses: Hypertension         potassium chloride SA (K-DUR,KLOR-CON M) 20 MEQ tablet  Take 1 tablet (20 mEq) by mouth 2 times daily    Qty: 180 tablet, Refills: 1      Associated Diagnoses: Hypertension; Congestive heart failure, unspecified         meclizine (ANTIVERT) 25 MG tablet  Take 1 tablet (25 mg) by mouth 4 times daily as needed    Qty: 360 tablet, Refills: 3      Associated Diagnoses: Dizzy         calcitonin, salmon,  (MIACALCIN) 200 UNIT/ACT nasal spray  Spray 1 spray into one nostril alternating nostrils daily Alternate nostril each day.    Qty: 3 Bottle, Refills: 3      Associated Diagnoses: Osteoporosis, unspecified         oxybutynin (DITROPAN) 5 MG tablet  TAKE 1 TABLET BY MOUTH TWICE DAILY    Qty: 180 tablet, Refills: 3      Associated Diagnoses: Unspecified urinary incontinence         carvedilol (COREG) 6.25 MG tablet  Take 1 tablet (6.25 mg) by mouth 2 times daily (with meals)    Qty: 60 tablet, Refills: 1      Associated Diagnoses: Cardiomyopathy         betamethasone valerate (VALISONE) 0.1 % ointment  Apply topically 2 times daily    Qty: 15 g, Refills: 0         Cholecalciferol (VITAMIN D3 PO)  Take 2,000 Units by mouth daily         budesonide (PULMICORT) 0.5 MG/2ML nebulizer solution  Take 2 mLs (0.5 mg) by nebulization 2 times daily    Qty: 3 Box, Refills: 1      Associated Diagnoses: COPD exacerbation         Cyanocobalamin (VITAMIN B 12 PO)  Take 1,000 mcg by mouth daily         CINNAMON PO  Take 1,000 mg by mouth daily         latanoprost (XALATAN) 0.005 % ophthalmic solution  Place 1 drop into both eyes At Bedtime.         WARFARIN SODIUM PO  Take 5 mg by mouth daily         collagenase ointment  Apply topically daily    Qty: 90 g, Refills: 3      Associated Diagnoses: Open wound         lidocaine (XYLOCAINE) 2 % jelly  Apply topically as needed for moderate pain    Qty: 85 mL, Refills: 3      Associated Diagnoses: Open wound         ipratropium - albuterol 0.5 mg/2.5 mg/3 mL (DUONEB) 0.5-2.5 (3) MG/3ML nebulization  Take 1 vial (3 mLs) by nebulization 4 times daily    Qty: 360 mL, Refills: 5      Associated Diagnoses: COPD exacerbation         albuterol (PROVENTIL) (5 MG/ML) 0.5% nebulizer solution  Take 0.5 mLs (2.5 mg) by nebulization every 4 hours as needed for wheezing or shortness of breath / dyspnea (dyspnea)    Qty: 60 vial      Associated Diagnoses: COPD exacerbation         Nitroglycerin (NITROSTAT  SL)  Place 0.4 mg under the tongue Repeat in 5 min x 2 if not gone.         !! - Potential duplicate medications found. Please discuss with provider.                 Discharge Instructions:     Follow-up Appointments     Follow-up and recommended labs and tests     Follow up with Dr. Fair , at (location with clinic name or city)     [facility] wound clinic, as previously scheduled. No follow up labs or test     are needed.     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 The following problems were addressed during her hospitalization:    RLE Non-healing wound    long-standing right lower extremity wound, nonhealing, with purulent debris.     Previously biopsied to rule out cancer.     X-ray of the lower extremity as well as an ultrasound of the arterial flow was completed without acute abnormality per report.     Treated with ABX initially with Vanco and Ertapenem.     Seen by Surgery Dr. Fair and taken for debridement of the wound on 7/10.     Also seen by ID. Symptoms improved with regards to pain s/p debridement.     Wound cultures + for E.Coli, Pseudomonas, GNR and enterococcus. BC 1 of 2 on 7/8 + for Strep viridans. Repeats on 7/9 and 7/10 NGTD.     Suspect contaminant.     Antibiotics not continued on discharge as debridement was felt to be sufficient.     To follow up with Dr. Fair in the [facility] to have a wound vac placed for 1 week and then to undergo a skin graft. Continue with Aquacel for wound.         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 To Follow with [facility].         H/o Chronic  atrial fibrillation/CHF/HTN/HLD:     The patient has a pacemaker. Last echocardiogram from 6/2014 was 55% to 60%. She currently demonstrates no signs of volume overload, but does have some chronic lower extremity edema. Fluids discontinued, but intake remains below baseline with borderline BP. Continue with prior to admission lisinopril, Torsemide and Coreg. Warfarin held for procedure but resumed post op. INR was 1.43 on d/c. Bridged with Lovenox gien her CHADS2 of 4.         Insulin-dependent diabetes mellitus:     Recent hemoglobin A1c is 6.4%. Home regimen includes Novolin 70/30 insulin 30 units with breakfast and 20-26 units with dinner. Was on 15 units due to decreased po intake and increased to 20 Q am 7/11. C/w 10 units at dinner. This is the dose she was discharged on. To adjust back to her pta dose as needed.         Chronic kidney disease, stage 3:     Baseline creatinine is 1.1-1.3. Currently at her baseline.         Pancytopenia:     This is chronic. She also has a history of MGUS with a negative bone marrow biopsy in 2013. Iron studies, b12, folate, retic, ps completed.             Consultations This Hospital Stay     SOCIAL WORK IP CONSULT    VASCULAR SURGERY IP CONSULT    INFECTIOUS DISEASES IP CONSULT        INR     Home RN to draw and send to PCP/INR Clinic.     Home care nursing referral     Home care PT referral     Home care OT referral     Follow Up and recommended labs and tests     Follow up with primary care provider, Michelle Reed within 14 days for hospital follow- up. No follow up labs or test are needed.    Follow up with Dr. Fair at the [facility] for continued RLE wound care. Plan is for a wound vac x 1 week to be applied in clinic followed by a skin graft.     Discharge Instructions     Continue with Lovenox BID until INR 2-3.    Adjust Lantus based on Finger sticks when you return home. You were on 30 units in the morning and 20 Units at night prior to coming to the hospital. I  "have reduced this to 20 units in the morning and 10 units at night due to you not requiring as much insulin here.     MD face to face encounter     Nurse is needed for complex aftercare of surgical procedures because the patient needs instruction and cannot perform care on their own due to location of wound and steps involved.     Physical therapy services are needed to assess and treat the following functional impairments: Deconditioning .     Occupational therapy services are needed to assess and treat cognitive ability and address ADL safety due to impairment in Ambulation and deconditioning.                Results for orders placed or performed during the hospital encounter of 07/08/15     US Lower Extremity Arterial Duplex Right      Narrative           Impression      IMPRESSION:    1. Patent right lower extremity arterial vessels without evidence of    significant stenotic or occlusive disease.    2. Right popliteal cyst.          Impression: Deep venous insufficiency in the right common femoral vein    and superficial venous insufficiency in the right greater saphenous    vein as described above.        HAROON VANG MD                  Review of Systems:  Skin:  Negative       Eyes:  Positive for glasses    ENT:  Negative      Respiratory:  Positive for   has O2 PRN   Cardiovascular:    Positive for (pt feels chest pressure around her entire chest area)    Gastroenterology: Negative      Genitourinary:  Negative      Musculoskeletal:  Positive for arthritis;joint pain;joint stiffness;muscular weakness knee and hip on left  Neurologic:  Positive for   neuropathy in legs and feet  Psychiatric:  Negative      Heme/Lymph/Imm:  Positive for allergies    Endocrine:  Positive for diabetes      Physical Exam:  Vitals: /58  Pulse 82  Ht 1.651 m (5' 5\")  Wt 123.7 kg (272 lb 11.2 oz)  LMP  (LMP Unknown)  BMI 45.38 kg/m2    Constitutional:  cooperative;alert and oriented;well developed;well nourished " morbidly obese      Skin:  warm and dry to the touch        Head:  normocephalic        Eyes:  pupils equal and round;sclera white        ENT:  no pallor or cyanosis        Neck:  carotid pulses are full and equal bilaterally;JVP normal;no carotid bruit        Chest:  clear to auscultation     few left base rales.     Cardiac: regular rhythm;normal S1 and S2 irregularly irregular rhythm     systolic murmur grade 1        Abdomen:  abdomen soft;non-tender;no masses obese      Vascular: pulses full and equal                                        Extremities and Back:  no deformities, clubbing, cyanosis, erythema observed stasis pigmentation bilateral LE edema;1+;pitting     tight bilateral lower extremity edema    Neurological:  affect appropriate, oriented to time, person and place   in a wheelchair      Recent Lab Results:  LIPID RESULTS:  Lab Results   Component Value Date    CHOL 105 06/08/2016    HDL 44 (L) 06/08/2016    LDL 37 06/08/2016    TRIG 122 06/08/2016    CHOLHDLRATIO 3.2 03/13/2014       LIVER ENZYME RESULTS:  Lab Results   Component Value Date    AST 19 04/11/2017    ALT 13 04/11/2017       CBC RESULTS:  Lab Results   Component Value Date    WBC 3.0 (L) 04/11/2017    RBC 3.46 (L) 04/11/2017    HGB 10.9 (L) 04/11/2017    HCT 33.4 (L) 04/11/2017    MCV 97 04/11/2017    MCH 31.5 04/11/2017    MCHC 32.6 04/11/2017    RDW 14.7 04/11/2017    PLT 96 (L) 04/11/2017       BMP RESULTS:  Lab Results   Component Value Date     04/11/2017    POTASSIUM 4.0 04/11/2017    CHLORIDE 105 04/11/2017    CO2 26 04/11/2017    ANIONGAP 8 04/11/2017     (H) 04/11/2017    BUN 14 04/11/2017    CR 1.02 04/11/2017    GFRESTIMATED 52 (L) 04/11/2017    GFRESTBLACK 63 04/11/2017    ANNALISA 9.8 04/11/2017        A1C RESULTS:  Lab Results   Component Value Date    A1C 5.7 04/18/2016       INR RESULTS:  Lab Results   Component Value Date    INR 3.3 06/06/2017    INR 2.3 05/23/2017           CC  Ed Shine MD    [facility]  [id number]  [place] [id number]

## 2017-06-07 NOTE — TELEPHONE ENCOUNTER
Reason for Call:  Medication or medication refill:    Do you use a [facility]?  Name of the pharmacy and phone number for the current request:   at the clinic!    Name of the medication requested: traMADol (ULTRAM) 50 MG tablet    Other request: pt. Had [facility] call for script.  They never received it.  She said she realized that she should  script here at the clinic.  Please print out script and she will  at the .    Would like to  this afternoon.  I did let her know that it could take up to 72 hours for script to be ready.  She will stop in and see if it is ready.    Can we leave a detailed message on this number? YES    Phone number patient can be reached at: Home number on file [phone] (home)    Best Time: any time    Call taken on 6/7/2017 at 12:14 PM by Julita Ansari  .

## 2017-06-07 NOTE — MR AVS SNAPSHOT
After Visit Summary   6/7/2017    Malgorzata Lan    MRN: [id number]           Patient Information     Date Of Birth          1940        Visit Information        Provider Department      6/7/2017 2:45 PM Ed Shine MD [facility]        Today's Diagnoses     Cardiac pacemaker in situ        Cardiomyopathy (H)        Chronic atrial fibrillation (H)        Essential hypertension        Aortic valve disease        Mixed hyperlipidemia           Follow-ups after your visit        Additional Services     Follow-Up with Cardiologist                 Your next 10 appointments already scheduled     Jun 14, 2017  4:00 PM CDT   Remote PPM Check with BUCIO TECH1   [facility] ([facility])    [address] [place], [place] [id number]  [facility] [id number] [phone]           This appointment is for a remote check of your pacemaker.  This is not an appointment at the office.              Future tests that were ordered for you today     Open Future Orders        Priority Expected Expires Ordered    Follow-Up with Cardiologist Routine 11/4/2017 6/7/2018 6/7/2017            If Something Goes Wrong     I will check my blood sugars twice a day     Notes - Note created  7/17/2014 10:25 AM by Jeannine Baker, RN    As of today's date 7/17/2014 goal is met at 76 - 100%.   Goal Status:  Active        Who to contact     If you have questions or need follow up information about today's clinic visit or your schedule please contact [facility] directly at [phone].  Normal or non-critical lab and imaging results will be communicated to you by MyChart, letter or phone within 4 business days after the clinic has received the results. If you do not hear from us within 7 days, please contact the clinic through MyChart or phone. If you have a critical or abnormal lab result, we will notify you by  "phone as soon as possible.  Submit refill requests through Dr. Tariff or call your pharmacy and they will forward the refill request to us. Please allow 3 business days for your refill to be completed.          Additional Information About Your Visit        Care EveryWhere ID     This is your Care EveryWhere ID. This could be used by other organizations to access your [place] medical records  [id number]        Your Vitals Were     Pulse Height Last Period BMI (Body Mass Index)          82 1.651 m (5' 5\") (LMP Unknown) 45.38 kg/m2         Blood Pressure from Last 3 Encounters:   06/07/17 110/58   04/11/17 112/68   03/23/17 128/78    Weight from Last 3 Encounters:   06/07/17 123.7 kg (272 lb 11.2 oz)   04/11/17 129.7 kg (286 lb)   03/23/17 129.7 kg (286 lb)              We Performed the Following     Follow-Up with Cardiologist          Today's Medication Changes          These changes are accurate as of: 6/7/17  3:26 PM.  If you have any questions, ask your nurse or doctor.               These medicines have changed or have updated prescriptions.        Dose/Directions    JANTOVEN 2.5 MG tablet   This may have changed:  Another medication with the same name was removed. Continue taking this medication, and follow the directions you see here.   Used for:  Chronic atrial fibrillation (H)   Generic drug:  warfarin   Changed by:  Michelle Reed MD        TK 3 TO 4 TS PO QD OR UTD BY INR CLINIC   Quantity:  100 tablet   Refills:  3                Primary Care Provider Office Phone # Fax #    Clair Segundo Michelle,  [phone]       [facility] [place] [address]  [place] [address]        Goals        General    I will call my PCP right away if I have increased shortness of breath.   (pt-stated)     Notes - Note created  7/17/2014 10:27 AM by Jeannine Baker RN    As of today's date 7/17/2014 goal is met at 51 - 75%.   Goal Status:  Active      I will weigh myself daily and records " results (pt-stated)     Notes - Note created  7/17/2014 10:25 AM by Jeannine Baker RN    As of today's date 7/17/2014 goal is met at 76 - 100%.   Goal Status:  Active        Thank you!     Thank you for choosing [facility] [place]  for your care. Our goal is always to provide you with excellent care. Hearing back from our patients is one way we can continue to improve our services. Please take a few minutes to complete the written survey that you may receive in the mail after your visit with us. Thank you!             Your Updated Medication List - Protect others around you: Learn how to safely use, store and throw away your medicines at www.disposemymeds.org.          This list is accurate as of: 6/7/17  3:26 PM.  Always use your most recent med list.                   Brand Name Dispense Instructions for use    acetaminophen 650 MG 8 hour tablet     100 tablet    Take 650 mg by mouth every 6 hours as needed for mild pain       albuterol (5 MG/ML) 0.5% neb solution    PROVENTIL    60 vial    Take 0.5 mLs (2.5 mg) by nebulization every 4 hours as needed for wheezing or shortness of breath / dyspnea (dyspnea)       alum & mag hydroxide-simethicone 400-400-40 MG/5ML Susp suspension    MYLANTA ES/MAALOX  ES     Take 15-30 mLs by mouth every 4 hours as needed for indigestion       ASPIRIN NOT PRESCRIBED    INTENTIONAL    0 each    Antiplatelet medication not prescribed intentionally due to Current anticoagulant therapy (warfarin/enoxaparin)       atorvastatin 40 MG tablet    LIPITOR    90 tablet    Take 1 tablet (40 mg) by mouth At Bedtime       betamethasone valerate 0.1 % ointment    VALISONE    15 g    Apply topically daily as needed To inside of ears.       blood glucose monitoring test strip    no brand specified    3 Month    Use to test blood sugars 3 -4  times daily or as directed       budesonide 0.5 MG/2ML neb solution    PULMICORT    60 ampule    Take 2 mLs (0.5 mg) by  nebulization 2 times daily as needed       calcitonin (salmon) 200 UNIT/ACT nasal spray    MIACALCIN    11.1 mL    SPRAY 1 SPRAY INTO ONE NOSTRIL DAILY ALTERNATE NOSTRIL EACH DAY, RIGHT NOSTRIL ON EVEN DAYS AND LEFT NOSTRIL ON ODD DAYS       carvedilol 6.25 MG tablet    COREG    180 tablet    Take 2 tablets (12.5 mg) by mouth 2 times daily (with meals)       CINNAMON PO      Take 1,000 mg by mouth daily       cod liver oil Caps capsule      Take 1 capsule by mouth daily       digoxin 125 MCG tablet    LANOXIN    90 tablet    Take 1 tablet (125 mcg) by mouth daily       fluticasone 50 MCG/ACT spray    FLONASE    3 Bottle    Spray 1-2 sprays into both nostrils daily       JANTOVEN 2.5 MG tablet   Generic drug:  warfarin     100 tablet    TK 3 TO 4 TS PO QD OR UTD BY INR CLINIC       latanoprost 0.005 % ophthalmic solution    XALATAN     Place 1 drop into both eyes At Bedtime.       Lidocaine 4 % Gel     30 g    Externally apply topically every 3 days Prior to dressing changes.       lisinopril 5 MG tablet    PRINIVIL/ZESTRIL    90 tablet    Take 1 tablet (5 mg) by mouth daily       meclizine 25 MG tablet    ANTIVERT    180 tablet    TAKE 1 TABLET(25 MG) BY MOUTH THREE TIMES DAILY AS NEEDED FOR DIZZINESS       nitroglycerin 0.4 MG sublingual tablet    NITROSTAT    30 tablet    If chest pain place under tongue call 911 if pain not resolved/ repeat every 5 min for total of 3 doses       NovoLIN MIX 70/30 VIAL injection   Generic drug:  insulin NPH-insulin regular     10 mL    Inject 30 Units Subcutaneous 2 times daily (with meals)       oxybutynin 5 MG tablet    DITROPAN    90 tablet    Take 1 tablet (5 mg) by mouth daily       pentoxifylline 400 MG CR tablet    TRENtal    270 tablet    TAKE 1 TABLET(400 MG) BY MOUTH THREE TIMES DAILY WITH MEALS       polyethylene glycol Packet    MIRALAX/GLYCOLAX     Take 1 packet by mouth 2 times daily as needed for constipation       potassium chloride SA 20 MEQ CR tablet     K-DUR/KLOR-CON M    180 tablet    Take 1 tablet (20 mEq) by mouth 2 times daily       torsemide 10 MG tablet    DEMADEX    180 tablet    Take 2 tablets (20 mg) by mouth At Bedtime       traMADol 50 MG tablet    ULTRAM    240 tablet    TAKE 1-2 TABLET BY MOUTH EVERY 6 HOURS AS NEEDED FOR PAIN       Vitamin B 12 100 MCG Lozg     100 lozenge    Take 1,000 mcg by mouth daily       vitamin D3 2000 UNITS Caps     90 capsule    Take 2,000 Units by mouth daily

## 2017-06-07 NOTE — TELEPHONE ENCOUNTER
Patient came into clinic to  her written prescription of Tramodol since it is a narcotic.  I told her we  manually faxed    I showed her our list of Class II, IV, V (signed rx manually faxed) vs. Class II ().  Patient said I should send the list to [facility] because she said they told her she needed to  the prescription.   I gave her the original signed rx and she will take it to [facility]

## 2017-06-07 NOTE — LETTER
6/7/2017    Clair Michelle, DO  [facility]   [address]  [facility] [address]    RE: Malgorzata Lan       Dear Colleague,    I had the pleasure of seeing Malgorzata Lan in the [facility].    I saw Malgorzata today.  She is 77.  She, as has recently been true, comes to visit in a wheelchair.  She was to note that she had lost 14 pounds since I saw her last summer which was really quite remarkable.  She is apparently on a protein diet, minimizing carbohydrates and fat.      She has been struggling with some nonhealing lower leg ulcers at home care.  [place]  Now with [facility], they are improving slowly.      She is not having episodes of dyspnea.  She is having occasional band-like discomfort across her breast that comes and goes, particularly in the evening and typically when she is not doing anything.  She has never tried a nitroglycerin and I told her to give that a try.      Appetite is good.  Bowel and urinary functions have been stable.  She lives in an assisted-living environment.  She has 1 sister who is in her 50s, but the 2 of them apparently have not gotten along and accordingly she lives with enjoying the friends that she has around her otherwise.      REVIEW OF SYSTEMS:   HEENT:  Negative.   RESPIRATORY:  Positive for dyspnea on exertion, but she does not push herself often.  She does have home O2.  She has had a little bit of lightheadedness if she gets up quickly, but she rarely does so.  UPPER GASTROINTESTINAL AND LOWER GASTROINTESTINAL:  Negative MUSCULOSKELETAL:  Positive for achiness of the joints and stiffness and chronic edema of her lower legs.   PSYCHIATRIC:  Negative.   HEMATOLOGIC:  Negative.   ENDOCRINE:  Positive for diabetes.      CURRENT MEDICATIONS:  Listed in Epic and include:   1.  Trental.    2.  Digoxin 0.125 mg a day.   3.  Lisinopril 5 mg a day.   4.  Demadex  20 mg at bedtime.   5.  Carvedilol 12.5 mg b.i.d.   6.  Atorvastatin 40 mg at bedtime.   7.  Pulmicort inhaler p.r.n.   8.  Calcium and vitamin D.   9.  Jantoven, per INR.   10.  Several p.r.n. medicines that I will not list.       PHYSICAL EXAMINATION:    VITAL SIGNS:  272  pounds.   Blood pressure 110/60, heart rate 80 beats a minute.     GENERAL:  She is sitting in the wheelchair, but appears comfortable.   NECK:  She had no neck vein distention at 90 degrees, no bruits heard.   HEART:  Regular without a definite gallop or murmur.   LUNGS:  Clear.     CHEST:  There is a pacemaker in the low left anterior chest buried in soft breast tissue.     ABDOMEN:  Pendulous, massive, firm, nontender.   EXTREMITIES:  Show bilateral superficial venous stasis changes, +2 to +3 edema.      Malgorzata Lan is one of the sweeter people I know.  She is maintained a remarkably cheerful demeanor considering her multiple limitations, from a cardiac standpoint.  She has had well preserved LV systolic function, moderate LVH, mild aortic stenosis, mild aortic insufficiency.  Her rhythm has been atrial fibrillation with pacing at a regular rate.  Her blood pressures are controlled.  She is on atorvastatin 40 mg at bedtime and her LDL has been at goal.      The warfarin is for her atrial fibrillation and she has had no obvious bleeding issues.      IMPRESSION:   1.  Chronic proper pacemaker performance.   2.  Chronic mild cardiomyopathy by history, but LVEF of 50% noted.   3.  Treated hypertension.   4.  Mild aortic stenosis.   5.  Chronic mixed hyperlipidemia.      PLAN:  I made no changes.  I did not order any tests.  I did not order any studies.  I thought she was relatively stable, from a cardiac standpoint, particularly in the context of how inactive she is forced to be.  We agreed to get together in 6 months.  She apparently appreciates my input, although at this juncture, I think she is stable.     Again, thank you for allowing me to  participate in the care of your patient.      Sincerely,    SIMEON MARSH MD     [facility]

## 2017-06-08 NOTE — PROGRESS NOTES
HISTORY OF PRESENT ILLNESS:  I saw Malgorzata today.  She is 77.  She, as has recently been true, comes to visit in a wheelchair.  She was to note that she had lost 14 pounds since I saw her last summer which was really quite remarkable.  She is apparently on a protein diet, minimizing carbohydrates and fat.      She has been struggling with some nonhealing lower leg ulcers at home care.  [place]  Now with [facility], they are improving slowly.      She is not having episodes of dyspnea.  She is having occasional band-like discomfort across her breast that comes and goes, particularly in the evening and typically when she is not doing anything.  She has never tried a nitroglycerin and I told her to give that a try.      Appetite is good.  Bowel and urinary functions have been stable.  She lives in an assisted-living environment.  She has 1 sister who is in her 50s, but the 2 of them apparently have not gotten along and accordingly she lives with enjoying the friends that she has around her otherwise.      REVIEW OF SYSTEMS:   HEENT:  Negative.   RESPIRATORY:  Positive for dyspnea on exertion, but she does not push herself often.  She does have home O2.  She has had a little bit of lightheadedness if she gets up quickly, but she rarely does so.  UPPER GASTROINTESTINAL AND LOWER GASTROINTESTINAL:  Negative MUSCULOSKELETAL:  Positive for achiness of the joints and stiffness and chronic edema of her lower legs.   PSYCHIATRIC:  Negative.   HEMATOLOGIC:  Negative.   ENDOCRINE:  Positive for diabetes.      CURRENT MEDICATIONS:  Listed in Epic and include:   1.  Trental.    2.  Digoxin 0.125 mg a day.   3.  Lisinopril 5 mg a day.   4.  Demadex 20 mg at bedtime.   5.  Carvedilol 12.5 mg b.i.d.   6.  Atorvastatin 40 mg at bedtime.   7.  Pulmicort inhaler p.r.n.   8.  Calcium and vitamin D.   9.  Jantoven, per INR.   10.  Several p.r.n. medicines that I will not  list.       PHYSICAL EXAMINATION:    VITAL SIGNS:  272  pounds.   Blood pressure 110/60, heart rate 80 beats a minute.     GENERAL:  She is sitting in the wheelchair, but appears comfortable.   NECK:  She had no neck vein distention at 90 degrees, no bruits heard.   HEART:  Regular without a definite gallop or murmur.   LUNGS:  Clear.     CHEST:  There is a pacemaker in the low left anterior chest buried in soft breast tissue.     ABDOMEN:  Pendulous, massive, firm, nontender.   EXTREMITIES:  Show bilateral superficial venous stasis changes, +2 to +3 edema.      Malgorzata Lan is one of the sweeter people I know.  She is maintained a remarkably cheerful demeanor considering her multiple limitations, from a cardiac standpoint.  She has had well preserved LV systolic function, moderate LVH, mild aortic stenosis, mild aortic insufficiency.  Her rhythm has been atrial fibrillation with pacing at a regular rate.  Her blood pressures are controlled.  She is on atorvastatin 40 mg at bedtime and her LDL has been at goal.      The warfarin is for her atrial fibrillation and she has had no obvious bleeding issues.      IMPRESSION:   1.  Chronic proper pacemaker performance.   2.  Chronic mild cardiomyopathy by history, but LVEF of 50% noted.   3.  Treated hypertension.   4.  Mild aortic stenosis.   5.  Chronic mixed hyperlipidemia.      PLAN:  I made no changes.  I did not order any tests.  I did not order any studies.  I thought she was relatively stable, from a cardiac standpoint, particularly in the context of how inactive she is forced to be.  We agreed to get together in 6 months.  She apparently appreciates my input, although at this juncture, I think she is stable.      cc:   Clair Michelle, [facility]   [address]   [place] [address]         SIMEON MARSH MD, FACC             D: 06/07/2017 15:26   T: 06/08/2017 13:19   MT: BELEM      Name:     MALGORZATA LAN   MRN:      [id number]         Account:      [id number]   :      1940           Service Date: 2017      Document: [id number]

## 2017-06-14 ENCOUNTER — ALLIED HEALTH/NURSE VISIT (OUTPATIENT)
Dept: CARDIOLOGY | Facility: CLINIC | Age: 77
End: 2017-06-14
Payer: MEDICARE

## 2017-06-14 DIAGNOSIS — Z95.0 CARDIAC PACEMAKER IN SITU: Primary | ICD-10-CM

## 2017-06-14 DIAGNOSIS — I48.20 CHRONIC ATRIAL FIBRILLATION (H): ICD-10-CM

## 2017-06-14 DIAGNOSIS — I50.9 CONGESTIVE HEART FAILURE, UNSPECIFIED CONGESTIVE HEART FAILURE CHRONICITY, UNSPECIFIED CONGESTIVE HEART FAILURE TYPE: ICD-10-CM

## 2017-06-14 PROCEDURE — 93296 REM INTERROG EVL PM/IDS: CPT | Performed by: INTERNAL MEDICINE

## 2017-06-14 PROCEDURE — 93294 REM INTERROG EVL PM/LDLS PM: CPT | Performed by: INTERNAL MEDICINE

## 2017-06-14 RX ORDER — CARVEDILOL 6.25 MG/1
12.5 TABLET ORAL 2 TIMES DAILY WITH MEALS
Qty: 180 TABLET | Refills: 3 | Status: SHIPPED | OUTPATIENT
Start: 2017-06-14

## 2017-06-14 NOTE — MR AVS SNAPSHOT
After Visit Summary   6/14/2017    Malgorzata Lan    MRN: [id number]           Patient Information     Date Of Birth          1940        Visit Information        Provider Department      6/14/2017 4:00 PM BUCIO TECH1 [facility]        Today's Diagnoses     Cardiac pacemaker in situ    -  1       Follow-ups after your visit        Your next 10 appointments already scheduled     Jun 14, 2017  4:00 PM CDT   Remote PPM Check with BUCIO TECH1   [facility] ([facility])    [address] [place] [id number] [phone]           This appointment is for a remote check of your pacemaker.  This is not an appointment at the office.              If Something Goes Wrong     I will check my blood sugars twice a day     Notes - Note created  7/17/2014 10:25 AM by Jeannine Baker RN    As of today's date 7/17/2014 goal is met at 76 - 100%.   Goal Status:  Active        Who to contact     If you have questions or need follow up information about today's clinic visit or your schedule please contact [facility] directly at [phone].  Normal or non-critical lab and imaging results will be communicated to you by MyChart, letter or phone within 4 business days after the clinic has received the results. If you do not hear from us within 7 days, please contact the clinic through MyChart or phone. If you have a critical or abnormal lab result, we will notify you by phone as soon as possible.  Submit refill requests through [employer] or call your pharmacy and they will forward the refill request to us. Please allow 3 business days for your refill to be completed.          Additional Information About Your Visit        Care EveryWhere ID     This is your Care EveryWhere ID. This could be used by other organizations to access your [place] medical records  [id number]         Your Vitals Were     Last Period                   (LMP Unknown)            Blood Pressure from Last 3 Encounters:   06/07/17 110/58   04/11/17 112/68   03/23/17 128/78    Weight from Last 3 Encounters:   06/07/17 123.7 kg (272 lb 11.2 oz)   04/11/17 129.7 kg (286 lb)   03/23/17 129.7 kg (286 lb)              We Performed the Following     INTERROGATION DEVICE EVAL REMOTE, PACER/ICD (45926)     PM DEVICE INTERROGATE REMOTE (02093)        Primary Care Provider Office Phone # Fax #    Clair Michelle, [phone]       [facility] [place] [address]  [place] [address]        Goals        General    I will call my PCP right away if I have increased shortness of breath.   (pt-stated)     Notes - Note created  7/17/2014 10:27 AM by Jeannine Baker RN    As of today's date 7/17/2014 goal is met at 51 - 75%.   Goal Status:  Active      I will weigh myself daily and records results (pt-stated)     Notes - Note created  7/17/2014 10:25 AM by Jeannine Baker RN    As of today's date 7/17/2014 goal is met at 76 - 100%.   Goal Status:  Active        Thank you!     Thank you for choosing [facility]  for your care. Our goal is always to provide you with excellent care. Hearing back from our patients is one way we can continue to improve our services. Please take a few minutes to complete the written survey that you may receive in the mail after your visit with us. Thank you!             Your Updated Medication List - Protect others around you: Learn how to safely use, store and throw away your medicines at www.disposemymeds.org.          This list is accurate as of: 6/14/17  8:22 AM.  Always use your most recent med list.                   Brand Name Dispense Instructions for use    acetaminophen 650 MG 8 hour tablet     100 tablet    Take 650 mg by mouth every 6 hours as needed for mild pain       albuterol (5 MG/ML) 0.5% neb solution    PROVENTIL    60  vial    Take 0.5 mLs (2.5 mg) by nebulization every 4 hours as needed for wheezing or shortness of breath / dyspnea (dyspnea)       alum & mag hydroxide-simethicone 400-400-40 MG/5ML Susp suspension    MYLANTA ES/MAALOX  ES     Take 15-30 mLs by mouth every 4 hours as needed for indigestion       ASPIRIN NOT PRESCRIBED    INTENTIONAL    0 each    Antiplatelet medication not prescribed intentionally due to Current anticoagulant therapy (warfarin/enoxaparin)       atorvastatin 40 MG tablet    LIPITOR    90 tablet    Take 1 tablet (40 mg) by mouth At Bedtime       betamethasone valerate 0.1 % ointment    VALISONE    15 g    Apply topically daily as needed To inside of ears.       blood glucose monitoring test strip    no brand specified    3 Month    Use to test blood sugars 3 -4  times daily or as directed       budesonide 0.5 MG/2ML neb solution    PULMICORT    60 ampule    Take 2 mLs (0.5 mg) by nebulization 2 times daily as needed       calcitonin (salmon) 200 UNIT/ACT nasal spray    MIACALCIN    11.1 mL    SPRAY 1 SPRAY INTO ONE NOSTRIL DAILY ALTERNATE NOSTRIL EACH DAY, RIGHT NOSTRIL ON EVEN DAYS AND LEFT NOSTRIL ON ODD DAYS       carvedilol 6.25 MG tablet    COREG    180 tablet    Take 2 tablets (12.5 mg) by mouth 2 times daily (with meals)       CINNAMON PO      Take 1,000 mg by mouth daily       cod liver oil Caps capsule      Take 1 capsule by mouth daily       digoxin 125 MCG tablet    LANOXIN    90 tablet    Take 1 tablet (125 mcg) by mouth daily       fluticasone 50 MCG/ACT spray    FLONASE    3 Bottle    Spray 1-2 sprays into both nostrils daily       JANTOVEN 2.5 MG tablet   Generic drug:  warfarin     100 tablet    TK 3 TO 4 TS PO QD OR UTD BY INR CLINIC       latanoprost 0.005 % ophthalmic solution    XALATAN     Place 1 drop into both eyes At Bedtime.       Lidocaine 4 % Gel     30 g    Externally apply topically every 3 days Prior to dressing changes.       lisinopril 5 MG tablet    PRINIVIL/ZESTRIL     90 tablet    Take 1 tablet (5 mg) by mouth daily       meclizine 25 MG tablet    ANTIVERT    180 tablet    TAKE 1 TABLET(25 MG) BY MOUTH THREE TIMES DAILY AS NEEDED FOR DIZZINESS       nitroglycerin 0.4 MG sublingual tablet    NITROSTAT    30 tablet    If chest pain place under tongue call 911 if pain not resolved/ repeat every 5 min for total of 3 doses       NovoLIN MIX 70/30 VIAL injection   Generic drug:  insulin NPH-insulin regular     10 mL    Inject 30 Units Subcutaneous 2 times daily (with meals)       oxybutynin 5 MG tablet    DITROPAN    90 tablet    Take 1 tablet (5 mg) by mouth daily       pentoxifylline 400 MG CR tablet    TRENtal    270 tablet    TAKE 1 TABLET(400 MG) BY MOUTH THREE TIMES DAILY WITH MEALS       polyethylene glycol Packet    MIRALAX/GLYCOLAX     Take 1 packet by mouth 2 times daily as needed for constipation       potassium chloride SA 20 MEQ CR tablet    K-DUR/KLOR-CON M    180 tablet    Take 1 tablet (20 mEq) by mouth 2 times daily       torsemide 10 MG tablet    DEMADEX    180 tablet    Take 2 tablets (20 mg) by mouth At Bedtime       traMADol 50 MG tablet    ULTRAM    240 tablet    TAKE 1-2 TABLET BY MOUTH EVERY 6 HOURS AS NEEDED FOR PAIN       Vitamin B 12 100 MCG Lozg     100 lozenge    Take 1,000 mcg by mouth daily       vitamin D3 2000 UNITS Caps     90 capsule    Take 2,000 Units by mouth daily

## 2017-06-14 NOTE — PROGRESS NOTES
[employer] (S) Remote PPM Device Check  : 18 %, chronic AFIB, taking Jantoven  Mode: VVI        Presenting Rhythm: VS events, rates 57-88bpm  Heart Rate: Rates 50-160bpm per histogram  Sensing: Stable    Pacing Threshold: Stable    Impedance: Stable  Battery Status: 6.5 years  Atrial Arrhythmia: N/A  Ventricular Arrhythmia: 90 ventricular high rates. 2 EGMs show irregular VS events for RVR. Episodes lasted 4-51 seconds, rates 69-205bpm. Reviewed with MORRIS Andrea.      Care Plan: F/u PPM Latitude q 3 months. Gave patient results over the phone.  Trudy,CVT

## 2017-06-16 ENCOUNTER — TRANSFERRED RECORDS (OUTPATIENT)
Dept: HEALTH INFORMATION MANAGEMENT | Facility: CLINIC | Age: 77
End: 2017-06-16

## 2017-06-16 ENCOUNTER — APPOINTMENT (OUTPATIENT)
Age: 77
Setting detail: DERMATOLOGY
End: 2017-07-04

## 2017-06-16 DIAGNOSIS — D18.0 HEMANGIOMA: ICD-10-CM

## 2017-06-16 PROBLEM — D18.01 HEMANGIOMA OF SKIN AND SUBCUTANEOUS TISSUE: Status: ACTIVE | Noted: 2017-06-16

## 2017-06-16 PROCEDURE — OTHER MIPS QUALITY: OTHER

## 2017-06-16 PROCEDURE — OTHER COUNSELING: OTHER

## 2017-06-16 PROCEDURE — 99202 OFFICE O/P NEW SF 15 MIN: CPT

## 2017-06-16 ASSESSMENT — LOCATION SIMPLE DESCRIPTION DERM: LOCATION SIMPLE: NOSE

## 2017-06-16 ASSESSMENT — LOCATION ZONE DERM: LOCATION ZONE: NOSE

## 2017-06-16 ASSESSMENT — LOCATION DETAILED DESCRIPTION DERM: LOCATION DETAILED: NASAL SUPRATIP

## 2017-06-16 NOTE — PROCEDURE: MIPS QUALITY
Detail Level: Detailed
Quality 226: Preventive Care And Screening: Tobacco Use: Screening And Cessation Intervention: Patient screened for tobacco and is an ex-smoker
Quality 431: Preventive Care And Screening: Unhealthy Alcohol Use - Screening: Patient screened for unhealthy alcohol use using a single question and scores less than 2 times per year
Quality 131: Pain Assessment And Follow-Up: Pain assessment using a standardized tool is documented as negative, no follow-up plan required
Quality 130: Documentation Of Current Medications In The Medical Record: Current Medications Documented
Quality 110: Preventive Care And Screening: Influenza Immunization: Influenza Immunization previously received during influenza season

## 2017-06-20 LAB — INR PPP: 4.2

## 2017-06-22 ENCOUNTER — TRANSFERRED RECORDS (OUTPATIENT)
Dept: HEALTH INFORMATION MANAGEMENT | Facility: CLINIC | Age: 77
End: 2017-06-22

## 2017-06-22 ENCOUNTER — ANTICOAGULATION THERAPY VISIT (OUTPATIENT)
Dept: FAMILY MEDICINE | Facility: CLINIC | Age: 77
End: 2017-06-22
Payer: MEDICARE

## 2017-06-22 DIAGNOSIS — Z79.01 LONG-TERM (CURRENT) USE OF ANTICOAGULANTS: ICD-10-CM

## 2017-06-22 DIAGNOSIS — I48.91 ATRIAL FIBRILLATION (H): ICD-10-CM

## 2017-06-22 PROCEDURE — 99207 ZZC NO CHARGE NURSE ONLY: CPT | Performed by: INTERNAL MEDICINE

## 2017-06-22 NOTE — PROGRESS NOTES
ANTICOAGULATION FOLLOW-UP CLINIC VISIT    Patient Name:  Malgorzata Lan  Date:  6/22/2017  Contact Type:  Fax received from [facility]. Called pt.  See flowsheet.     SUBJECTIVE:     Patient Findings     Comments INR results from 6-20-17 delivered to INR room 6-22-17.    Called patient.   Patient states she took 2.5mg 6-20-17 due to high INR, then resumed 5mg daily.   Reports increased stress.  Also has wound on lower left leg--- wound care seeing patient.  Patient hopeful that wound is improving--slowly.            OBJECTIVE    INR   Date Value Ref Range Status   06/20/2017 4.2  Final       ASSESSMENT / PLAN  INR assessment SUPRA    Recheck INR In: 1 WEEK    INR Location Home INR    Billed home INR Yes      Anticoagulation Summary as of 6/22/2017     INR goal 2.0-3.0   Today's INR 4.2! (6/20/2017)   Maintenance plan 5 mg (2.5 mg x 2) every day   Full instructions 5 mg every day   Weekly total 35 mg   Plan last modified Bharati Saini RN (10/4/2016)   Next INR check 6/27/2017   Priority INR   Target end date     Indications   Long-term (current) use of anticoagulants [Z79.01] [Z79.01]  Atrial fibrillation (H) [I48.91]         Anticoagulation Episode Summary     INR check location Home Draw    Preferred lab     Send INR reminders to  ANTICOAGULATION    Comments [facility] Home INR      Anticoagulation Care Providers     Provider Role Specialty Phone number    Derek Michelle Crow MD [facility] [phone]            See the Encounter Report to view Anticoagulation Flowsheet and Dosing Calendar (Go to Encounters tab in chart review, and find the Anticoagulation Therapy Visit)    Dosage adjustment made based on physician directed care plan.    Jen Lowry, RN

## 2017-06-22 NOTE — MR AVS SNAPSHOT
Malgorzata [place]   6/22/2017   Anticoagulation Therapy Visit    Description:  77 year old female   Provider:  Clair Michelle DO   Department:  [facility]           INR as of 6/22/2017     Today's INR 4.2! (6/20/2017)      Anticoagulation Summary as of 6/22/2017     INR goal 2.0-3.0   Today's INR 4.2! (6/20/2017)   Full instructions 5 mg every day   Next INR check 6/27/2017    Indications   Long-term (current) use of anticoagulants [Z79.01] [Z79.01]  Atrial fibrillation (H) [I48.91]         June 2017 Details    Sun Mon Tue Wed Thu Fri Sat         1               2               3                 4               5               6               7               8               9               10                 11               12               13               14               15               16               17                 18               19               20               21               22      5 mg   See details      23      5 mg         24      5 mg           25      5 mg         26      5 mg         27            28               29               30                 Date Details   06/22 This INR check       Date of next INR:  6/27/2017         How to take your warfarin dose     To take:  5 mg Take 2 of the 2.5 mg tablets.

## 2017-06-26 ENCOUNTER — ANTICOAGULATION THERAPY VISIT (OUTPATIENT)
Dept: FAMILY MEDICINE | Facility: CLINIC | Age: 77
End: 2017-06-26
Payer: MEDICARE

## 2017-06-26 ENCOUNTER — TELEPHONE (OUTPATIENT)
Dept: FAMILY MEDICINE | Facility: CLINIC | Age: 77
End: 2017-06-26

## 2017-06-26 DIAGNOSIS — Z79.01 LONG-TERM (CURRENT) USE OF ANTICOAGULANTS: ICD-10-CM

## 2017-06-26 DIAGNOSIS — I48.91 ATRIAL FIBRILLATION (H): ICD-10-CM

## 2017-06-26 LAB — INR POINT OF CARE: 3 (ref 0.86–1.14)

## 2017-06-26 PROCEDURE — 99207 ZZC NO CHARGE NURSE ONLY: CPT | Performed by: INTERNAL MEDICINE

## 2017-06-26 PROCEDURE — 85610 PROTHROMBIN TIME: CPT | Mod: QW | Performed by: INTERNAL MEDICINE

## 2017-06-26 PROCEDURE — 36416 COLLJ CAPILLARY BLOOD SPEC: CPT | Performed by: INTERNAL MEDICINE

## 2017-06-26 NOTE — PROGRESS NOTES
ANTICOAGULATION FOLLOW-UP CLINIC VISIT    Patient Name:  Malgorzata Lan  Date:  6/26/2017  Contact Type:  Face to Face    SUBJECTIVE:     Patient Findings     Positives Intentional hold of therapy           OBJECTIVE    INR Protime   Date Value Ref Range Status   06/26/2017 3.0 (A) 0.86 - 1.14 Final       ASSESSMENT / PLAN  INR assessment THER    Recheck INR In: 1 WEEK    INR Location Home INR      Anticoagulation Summary as of 6/26/2017     INR goal 2.0-3.0   Today's INR 3.0   Maintenance plan 2.5 mg (2.5 mg x 1) on Tue; 5 mg (2.5 mg x 2) all other days   Full instructions 2.5 mg on Tue; 5 mg all other days   Weekly total 32.5 mg   Plan last modified Leni Renae RN (6/26/2017)   Next INR check 7/3/2017   Priority INR   Target end date     Indications   Long-term (current) use of anticoagulants [Z79.01] [Z79.01]  Atrial fibrillation (H) [I48.91]         Anticoagulation Episode Summary     INR check location Home Draw    Preferred lab     Send INR reminders to  ANTICOAGULATION    Comments [facility] Home INR      Anticoagulation Care Providers     Provider Role Specialty Phone number    ReedMichelle MD Responsible Family Practice [phone]            See the Encounter Report to view Anticoagulation Flowsheet and Dosing Calendar (Go to Encounters tab in chart review, and find the Anticoagulation Therapy Visit)    Dosage adjustment made based on physician directed care plan.  Home monitor pt.  Has made temp adjustments down, then goes suprapubic again.  Made permanent adjust to plan of reducing Tuesday, will recheck next week for safety, either Mon or Wed at pt's convenience as regular day is 4th of July.    Leni Renae, RN

## 2017-06-26 NOTE — TELEPHONE ENCOUNTER
Reason for Call:  INR    Who is calling?  Pt     Phone number: [phone]    Name of caller: Malgorzata    INR Value:  3.0    Are there any other concerns:  No    Can we leave a detailed message on this number? YES    Phone number patient can be reached at: Home number on file [phone] (home)      Call taken on 6/26/2017 at 2:25 PM by Vanessa Núñez

## 2017-06-26 NOTE — MR AVS SNAPSHOT
Malgorzata Lan   6/26/2017   Anticoagulation Therapy Visit    Description:  77 year old female   Provider:  Clair Michelle DO   Department:  [facility]           INR as of 6/26/2017     Today's INR 3.0      Anticoagulation Summary as of 6/26/2017     INR goal 2.0-3.0   Today's INR 3.0   Full instructions 2.5 mg on Tue; 5 mg all other days   Next INR check 7/5/2017    Indications   Long-term (current) use of anticoagulants [Z79.01] [Z79.01]  Atrial fibrillation (H) [I48.91]         June 2017 Details    Sun Mon Tue Wed Thu Fri Sat         1               2               3                 4               5               6               7               8               9               10                 11               12               13               14               15               16               17                 18               19               20               21               22               23               24                 25               26      5 mg   See details      27      2.5 mg         28      5 mg         29      5 mg         30      5 mg           Date Details   06/26 This INR check               How to take your warfarin dose     To take:  2.5 mg Take 1 of the 2.5 mg tablets.    To take:  5 mg Take 2 of the 2.5 mg tablets.           July 2017 Details    Sun Mon Tue Wed Thu Fri Sat           1      5 mg           2      5 mg         3      5 mg         4      2.5 mg         5            6               7               8                 9               10               11               12               13               14               15                 16               17               18               19               20               21               22                 23               24               25               26               27               28               29                 30               31                     Date Details   No additional details    Date  of next INR:  7/5/2017         How to take your warfarin dose     To take:  2.5 mg Take 1 of the 2.5 mg tablets.    To take:  5 mg Take 2 of the 2.5 mg tablets.

## 2017-07-05 ENCOUNTER — ANTICOAGULATION THERAPY VISIT (OUTPATIENT)
Dept: FAMILY MEDICINE | Facility: CLINIC | Age: 77
End: 2017-07-05
Payer: MEDICARE

## 2017-07-05 ENCOUNTER — TELEPHONE (OUTPATIENT)
Dept: FAMILY MEDICINE | Facility: CLINIC | Age: 77
End: 2017-07-05

## 2017-07-05 DIAGNOSIS — Z79.01 LONG-TERM (CURRENT) USE OF ANTICOAGULANTS: ICD-10-CM

## 2017-07-05 DIAGNOSIS — I48.91 ATRIAL FIBRILLATION (H): ICD-10-CM

## 2017-07-05 LAB — INR PPP: 3.9

## 2017-07-05 PROCEDURE — 99207 ZZC NO CHARGE NURSE ONLY: CPT | Performed by: INTERNAL MEDICINE

## 2017-07-05 NOTE — MR AVS SNAPSHOT
Malgorzata Lan   7/5/2017   Anticoagulation Therapy Visit    Description:  77 year old female   Provider:  Clair Michelle DO   Department:  [facility]           INR as of 7/5/2017     Today's INR 3.9!      Anticoagulation Summary as of 7/5/2017     INR goal 2.0-3.0   Today's INR 3.9!   Full instructions 7/5: 2.5 mg; 7/6: 2.5 mg; 7/11: 5 mg; Otherwise 2.5 mg on Tue; 5 mg all other days   Next INR check 7/12/2017    Indications   Long-term (current) use of anticoagulants [Z79.01] [Z79.01]  Atrial fibrillation (H) [I48.91]         July 2017 Details    Sun Mon Tue Wed Thu Fri Sat           1                 2               3               4               5      2.5 mg   See details      6      2.5 mg         7      5 mg         8      5 mg           9      5 mg         10      5 mg         11      5 mg         12            13               14               15                 16               17               18               19               20               21               22                 23               24               25               26               27               28               29                 30               31                     Date Details   07/05 This INR check       Date of next INR:  7/12/2017         How to take your warfarin dose     To take:  2.5 mg Take 1 of the 2.5 mg tablets.    To take:  5 mg Take 2 of the 2.5 mg tablets.

## 2017-07-05 NOTE — PROGRESS NOTES
ANTICOAGULATION FOLLOW-UP CLINIC VISIT    Patient Name:  Malgorzata Lan  Date:  7/5/2017  Contact Type:  Telephone/ Dose instructions left on patient's voice mail    SUBJECTIVE:     Patient Findings     Positives No Problem Findings           OBJECTIVE    INR   Date Value Ref Range Status   07/05/2017 3.9  Final       ASSESSMENT / PLAN  INR assessment SUPRA    Recheck INR In: 1 WEEK    INR Location Clinic      Anticoagulation Summary as of 7/5/2017     INR goal 2.0-3.0   Today's INR 3.9!   Maintenance plan 2.5 mg (2.5 mg x 1) on Tue; 5 mg (2.5 mg x 2) all other days   Full instructions 7/5: 2.5 mg; 7/6: 2.5 mg; 7/11: 5 mg; Otherwise 2.5 mg on Tue; 5 mg all other days   Weekly total 32.5 mg   Plan last modified Leni Renae RN (6/26/2017)   Next INR check 7/12/2017   Priority INR   Target end date     Indications   Long-term (current) use of anticoagulants [Z79.01] [Z79.01]  Atrial fibrillation (H) [I48.91]         Anticoagulation Episode Summary     INR check location Home Draw    Preferred lab     Send INR reminders to  ANTICOAGULATION    Comments [facility] Home INR      Anticoagulation Care Providers     Provider Role Specialty Phone number    Michelle Reed MD [facility] [phone]            See the Encounter Report to view Anticoagulation Flowsheet and Dosing Calendar (Go to Encounters tab in chart review, and find the Anticoagulation Therapy Visit)    Dosage adjustment made based on physician directed care plan.    Starla Sue RN

## 2017-07-05 NOTE — TELEPHONE ENCOUNTER
Reason for Call:  INR    Who is calling?  Pt    Phone number:  [phone]    Name of caller: Malgorzata    INR Value:  3.9    Are there any other concerns:  No    Can we leave a detailed message on this number? YES    Phone number patient can be reached at: Home number on file [phone] (home)      Call taken on 7/5/2017 at 4:05 PM by Vanessa Núñez

## 2017-07-07 ENCOUNTER — TELEPHONE (OUTPATIENT)
Dept: FAMILY MEDICINE | Facility: CLINIC | Age: 77
End: 2017-07-07

## 2017-07-07 DIAGNOSIS — S81.802D NON-HEALING WOUND OF LOWER EXTREMITY, LEFT, SUBSEQUENT ENCOUNTER: ICD-10-CM

## 2017-07-07 NOTE — TELEPHONE ENCOUNTER
Reason for Call:  Medication or medication refill:    Do you use a [facility]?  Name of the pharmacy and phone number for the current request:       [facility] [address] [place] [address] [address] AT [facility] [address]      Name of the medication requested: Lidocaine Solution 4%    Other request: nurse Neisha is calling    Can we leave a detailed message on this number? YES    Phone number patient can be reached at: [phone] Kaila constantino    Best Time: any    Call taken on 7/7/2017 at 1:01 PM by Luz Ford

## 2017-07-11 ENCOUNTER — ANTICOAGULATION THERAPY VISIT (OUTPATIENT)
Dept: FAMILY MEDICINE | Facility: CLINIC | Age: 77
End: 2017-07-11
Payer: MEDICARE

## 2017-07-11 DIAGNOSIS — I48.0 PAROXYSMAL ATRIAL FIBRILLATION (H): ICD-10-CM

## 2017-07-11 DIAGNOSIS — Z79.01 LONG-TERM (CURRENT) USE OF ANTICOAGULANTS: ICD-10-CM

## 2017-07-11 LAB — INR PPP: 2.8

## 2017-07-11 PROCEDURE — 99207 ZZC NO CHARGE NURSE ONLY: CPT | Performed by: INTERNAL MEDICINE

## 2017-07-11 NOTE — MR AVS SNAPSHOT
Malgorzata Lan   7/11/2017   Anticoagulation Therapy Visit    Description:  77 year old female   Provider:  Clair Michelle DO   Department:  [facility]           INR as of 7/11/2017     Today's INR 2.8      Anticoagulation Summary as of 7/11/2017     INR goal 2.0-3.0   Today's INR 2.8   Full instructions 7/13: 2.5 mg; Otherwise 2.5 mg on Tue; 5 mg all other days   Next INR check 7/18/2017    Indications   Long-term (current) use of anticoagulants [Z79.01] [Z79.01]  Atrial fibrillation (H) [I48.91]         Description     [facility] home monitoring patient.      July 2017 Details    Sun Mon Tue Wed Thu Fri Sat           1                 2               3               4               5               6               7               8                 9               10               11      2.5 mg   See details      12      5 mg         13      2.5 mg         14      5 mg         15      5 mg           16      5 mg         17      5 mg         18            19               20               21               22                 23               24               25               26               27               28               29                 30               31                     Date Details   07/11 This INR check       Date of next INR:  7/18/2017         How to take your warfarin dose     To take:  2.5 mg Take 1 of the 2.5 mg tablets.    To take:  5 mg Take 2 of the 2.5 mg tablets.

## 2017-07-11 NOTE — PROGRESS NOTES
ANTICOAGULATION FOLLOW-UP CLINIC VISIT    Patient Name:  Malgorzata Lan  Date:  7/11/2017  Contact Type:  Telephone/ Malgorzata    SUBJECTIVE:     Patient Findings     Positives No Problem Findings           OBJECTIVE    INR   Date Value Ref Range Status   07/11/2017 2.8  Final       ASSESSMENT / PLAN  INR assessment THER    Recheck INR In: 1 WEEK    INR Location Home INR      Anticoagulation Summary as of 7/11/2017     INR goal 2.0-3.0   Today's INR 2.8   Maintenance plan 2.5 mg (2.5 mg x 1) on Tue; 5 mg (2.5 mg x 2) all other days   Full instructions 7/13: 2.5 mg; Otherwise 2.5 mg on Tue; 5 mg all other days   Weekly total 32.5 mg   Plan last modified Leni Renae RN (6/26/2017)   Next INR check 7/18/2017   Priority INR   Target end date     Indications   Long-term (current) use of anticoagulants [Z79.01] [Z79.01]  Atrial fibrillation (H) [I48.91]         Anticoagulation Episode Summary     INR check location Home Draw    Preferred lab     Send INR reminders to  ANTICOAGULATION    Comments [facility] Home INR      Anticoagulation Care Providers     Provider Role Specialty Phone number    Derek Michelle Crow MD Responsible Family Practice [phone]            See the Encounter Report to view Anticoagulation Flowsheet and Dosing Calendar (Go to Encounters tab in chart review, and find the Anticoagulation Therapy Visit)    Patient is a home INR [facility] patient.  She confirms and agrees to dosing schedule as above. She agrees to check her INR next week.  Dosing based on [facility] Protocol and Provider directed care plan.      Bharati Saini, RN

## 2017-07-17 ENCOUNTER — TELEPHONE (OUTPATIENT)
Dept: FAMILY MEDICINE | Facility: CLINIC | Age: 77
End: 2017-07-17

## 2017-07-17 NOTE — TELEPHONE ENCOUNTER
Reason for Call:  Home Health Care    [place] with [facility] called regarding (reason for call): Orders    Orders are needed for this patient. Home Health Aide 1 x for this week      Phone Number Homecare Nurse can be reached at: [phone]    Can we leave a detailed message on this number? YES      Call taken on 7/17/2017 at 10:48 AM by Julita Ansari  .

## 2017-07-17 NOTE — TELEPHONE ENCOUNTER
TC's: Please call Patient to schedule follow up with PCP, then route back to triage to call homecare    Thank you    Kristin Phillips RN

## 2017-07-19 ENCOUNTER — TELEPHONE (OUTPATIENT)
Dept: FAMILY MEDICINE | Facility: CLINIC | Age: 77
End: 2017-07-19

## 2017-07-19 ENCOUNTER — ANTICOAGULATION THERAPY VISIT (OUTPATIENT)
Dept: FAMILY MEDICINE | Facility: CLINIC | Age: 77
End: 2017-07-19
Payer: MEDICARE

## 2017-07-19 DIAGNOSIS — Z79.01 LONG-TERM (CURRENT) USE OF ANTICOAGULANTS: ICD-10-CM

## 2017-07-19 DIAGNOSIS — I48.91 ATRIAL FIBRILLATION (H): ICD-10-CM

## 2017-07-19 LAB — INR PPP: 2.4

## 2017-07-19 PROCEDURE — 99207 ZZC NO CHARGE NURSE ONLY: CPT | Performed by: INTERNAL MEDICINE

## 2017-07-19 NOTE — TELEPHONE ENCOUNTER
Reason for Call:  INR    Who is calling?  pt    Phone number:      Fax number:      Name of caller: pt    INR Value:  2.4    Are there any other concerns:  No - pt is calling in her own INR    Can we leave a detailed message on this number? YES    Phone number patient can be reached at: Home number on file [phone] (home)      Call taken on 7/19/2017 at 2:28 PM by Luz Ford

## 2017-07-19 NOTE — MR AVS SNAPSHOT
Malgorzata Lan   7/19/2017   Anticoagulation Therapy Visit    Description:  77 year old female   Provider:  Clair Michelle DO   Department:  [facility]           INR as of 7/19/2017     Today's INR 2.4      Anticoagulation Summary as of 7/19/2017     INR goal 2.0-3.0   Today's INR 2.4   Full instructions 2.5 mg on Mon, Thu; 5 mg all other days   Next INR check 8/2/2017    Indications   Long-term (current) use of anticoagulants [Z79.01] [Z79.01]  Atrial fibrillation (H) [I48.91]         July 2017 Details    Sun Mon Tue Wed Thu Fri Sat           1                 2               3               4               5               6               7               8                 9               10               11               12               13               14               15                 16               17               18               19      5 mg   See details      20      2.5 mg         21      5 mg         22      5 mg           23      5 mg         24      2.5 mg         25      5 mg         26      5 mg         27      2.5 mg         28      5 mg         29      5 mg           30      5 mg         31      2.5 mg               Date Details   07/19 This INR check               How to take your warfarin dose     To take:  2.5 mg Take 1 of the 2.5 mg tablets.    To take:  5 mg Take 2 of the 2.5 mg tablets.           August 2017 Details    Sun Mon Tue Wed Thu Fri Sat       1      5 mg         2            3               4               5                 6               7               8               9               10               11               12                 13               14               15               16               17               18               19                 20               21               22               23               24               25               26                 27               28               29               30               31                   Date Details   No additional details    Date of next INR:  8/2/2017         How to take your warfarin dose     To take:  5 mg Take 2 of the 2.5 mg tablets.

## 2017-07-19 NOTE — PROGRESS NOTES
ANTICOAGULATION FOLLOW-UP CLINIC VISIT    Patient Name:  Malgorzata Lan  Date:  7/19/2017  Contact Type:  Telephone/ Dose instrutions given to patient    SUBJECTIVE:     Patient Findings     Positives No Problem Findings    Comments Patient states she took 2.5 mg MTh and 5 mg ROW past week           OBJECTIVE    INR   Date Value Ref Range Status   07/19/2017 2.4  Final       ASSESSMENT / PLAN  INR assessment THER    Recheck INR In: 2 WEEKS    INR Location Home INR      Anticoagulation Summary as of 7/19/2017     INR goal 2.0-3.0   Today's INR 2.4   Maintenance plan 2.5 mg (2.5 mg x 1) on Mon, Thu; 5 mg (2.5 mg x 2) all other days   Full instructions 2.5 mg on Mon, Thu; 5 mg all other days   Weekly total 30 mg   Plan last modified Starla Sue RN (7/19/2017)   Next INR check 8/2/2017   Priority INR   Target end date     Indications   Long-term (current) use of anticoagulants [Z79.01] [Z79.01]  Atrial fibrillation (H) [I48.91]         Anticoagulation Episode Summary     INR check location Home Draw    Preferred lab     Send INR reminders to [facility] ANTICOAGULATION    Comments [facility] Home INR      Anticoagulation Care Providers     Provider Role Specialty Phone number    Derek Michelle Crow MD [facility] [phone]            See the Encounter Report to view Anticoagulation Flowsheet and Dosing Calendar (Go to Encounters tab in chart review, and find the Anticoagulation Therapy Visit)    Dosage adjustment made based on physician directed care plan.    Starla Sue RN

## 2017-07-19 NOTE — TELEPHONE ENCOUNTER
Yosi from  called earlier for orders.  Did he draw INR today?  Note below states patient called in result?   left asking Yosi to call back ([phone])  Hayley Sue RN

## 2017-07-19 NOTE — TELEPHONE ENCOUNTER
Reason for Call:  Home Health Care    oh with [facility] called regarding (reason for call):     Orders are needed for this patient.     PT:     OT:     Skilled Nursing: three times a week for 8 weeks.  Twice a week for 1 weeks  4 PRN      HHA:  Twice a week for 9 weeks.    Pt Provider:     Phone Number Homecare Nurse can be reached at: [phone]    Best Time: any    Call taken on 7/19/2017 at 2:19 PM by Luz Ford

## 2017-07-19 NOTE — TELEPHONE ENCOUNTER
Verbal orders given to the below orders.  She will fax orders for PCP signature.  Bharati Saini RN

## 2017-07-31 ENCOUNTER — HOSPITAL ENCOUNTER (OUTPATIENT)
Dept: WOUND CARE | Facility: CLINIC | Age: 77
Discharge: HOME | End: 2017-07-31
Attending: SURGERY | Admitting: SURGERY
Payer: MEDICARE

## 2017-07-31 VITALS
DIASTOLIC BLOOD PRESSURE: 76 MMHG | SYSTOLIC BLOOD PRESSURE: 128 MMHG | TEMPERATURE: 97.4 F | RESPIRATION RATE: 16 BRPM | HEART RATE: 75 BPM

## 2017-07-31 LAB — INR PPP: 3.8

## 2017-07-31 PROCEDURE — 99213 OFFICE O/P EST LOW 20 MIN: CPT | Mod: 25 | Performed by: SURGERY

## 2017-07-31 PROCEDURE — A6235 HYDROCOLLD DRG >16<=48 W/O B: HCPCS

## 2017-07-31 PROCEDURE — 11045 DBRDMT SUBQ TISS EACH ADDL: CPT

## 2017-07-31 PROCEDURE — 99214 OFFICE O/P EST MOD 30 MIN: CPT | Mod: 25

## 2017-07-31 PROCEDURE — 11045 DBRDMT SUBQ TISS EACH ADDL: CPT | Performed by: SURGERY

## 2017-07-31 PROCEDURE — 11042 DBRDMT SUBQ TIS 1ST 20SQCM/<: CPT

## 2017-07-31 PROCEDURE — 11042 DBRDMT SUBQ TIS 1ST 20SQCM/<: CPT | Performed by: SURGERY

## 2017-07-31 NOTE — PROGRESS NOTES
[facility] [place] Progress Note    Subject: Malgorzata Lan Was referred to see us at the wound clinic for a spontaneously appearing left distal anterior calf ulcer.  She awoke with bleeding from the area proximally four months ago.  No specific trauma.  She's been followed by home health care who is using Aquacel Ag over the ulcers along with a barrier cream on the sides.  This felt the debridement was required and she comes to discuss this with us.    We saw her 2 years ago for a  ulceration to her right calf that underwent successful treatment and eventual split-thickness skin grafting in August 2015.  This is healed over well with no further problems.    PMH: No history of DVT or PAD            Obesity--weight loss of approximately 50 pounds since last visit              Current Outpatient Prescriptions   Medication     Lidocaine 4 % GEL     carvedilol (COREG) 6.25 MG tablet     traMADol (ULTRAM) 50 MG tablet     pentoxifylline (TRENTAL) 400 MG CR tablet     meclizine (ANTIVERT) 25 MG tablet     betamethasone valerate (VALISONE) 0.1 % ointment     oxybutynin (DITROPAN) 5 MG tablet     digoxin (LANOXIN) 125 MCG tablet     calcitonin, salmon, (MIACALCIN) 200 UNIT/ACT nasal spray     lisinopril (PRINIVIL/ZESTRIL) 5 MG tablet     potassium chloride SA (K-DUR/KLOR-CON M) 20 MEQ CR tablet     torsemide (DEMADEX) 10 MG tablet     blood glucose monitoring (NO BRAND SPECIFIED) test strip     ASPIRIN NOT PRESCRIBED (INTENTIONAL)     fluticasone (FLONASE) 50 MCG/ACT nasal spray     albuterol (PROVENTIL) (5 MG/ML) 0.5% nebulizer solution     atorvastatin (LIPITOR) 40 MG tablet     budesonide (PULMICORT) 0.5 MG/2ML nebulizer solution     Cyanocobalamin (VITAMIN B 12) 100 MCG LOZG     Cholecalciferol (VITAMIN D3) 2000 UNITS CAPS     NOVOLIN MIX 70/30 VIAL (70-30) 100 UNIT/ML susp     nitroglycerin (NITROSTAT) 0.4 MG SL tablet     JANTOVEN 2.5 MG tablet     acetaminophen 650 MG 8 hour tablet     alum & mag  hydroxide-simethicone (MYLANTA ES/MAALOX  ES) 400-400-40 MG/5ML SUSP     Cod Liver Oil CAPS     polyethylene glycol (MIRALAX/GLYCOLAX) packet     CINNAMON PO     latanoprost (XALATAN) 0.005 % ophthalmic solution     No current facility-administered medications for this encounter.      Nonsmoker.  Lives independently.  Ambulation is limited with short distance walking in transferring..  She does use a motorized wheelchair most of the time.      Exam:  /76  Pulse 75  Temp 97.4  F (36.3  C) (Temporal)  Resp 16  LMP  (LMP Unknown)   Talkative.  Comfortable.  Alert and appropriate.  Chest= clear          Cardiovascular= regular rate  Abdomen= obese and nontender           Mild calf and foot edema.  Normal sensation.            +3 palpable dorsalis pedis pulses bilaterally.          Well-healed right calf split-thickness skin graft           Left anterior lateral middistal calf with ulceration measuring 13.1 x 10 cm with a depth of 0.2 cm.  Healthy granulation tissue is noted proximally.  However, distally there is some nonviable tissue noted.  No undermining.  Critic-Aid on skin borders with  Normal underlying skin.    Procedure:  Time out was called, informed consent obtained, and topical anesthetic of 4% lidocaine was applied per standing protocol, debridement was performed using a #15 blade down to and including subcutaneous tissue .  We excised the nonviable tissue particularly distally down to healthy bleeding tissue.  Skin edges were excised also 0.1 cm circumferentially.  Debrided less than 20 cm .  She had some minimal discomfort but stopped with reapplication with topical lidocaine. Bleeding controlled with light pressure. Patient tolerated procedure well.    We redressed with Aquacel Ag and an absorptive pad avoiding the Critic-Aid along with a size F Spandagrip    Impression: Left calf ulcer of uncertain etiology.  Wound is fairly large  Over 50% has excellent granulation.  More extensive debridement  of the poor tissue distally.  No underlying arterial insufficiency.  Fairly well-controlled edema.  Continue with dressing changes three times weekly the aid of home health care.  We will see her again in three weeks for reevaluation.  May need eventually a split-thickness skin graft due to the size of the wound.    Plan:  Return to clinic in three weeks.    Yandel Fair MD  07/31/17@ 12:22 PM

## 2017-08-01 ENCOUNTER — ANTICOAGULATION THERAPY VISIT (OUTPATIENT)
Dept: NURSING | Facility: CLINIC | Age: 77
End: 2017-08-01
Payer: MEDICARE

## 2017-08-01 DIAGNOSIS — I48.91 ATRIAL FIBRILLATION (H): ICD-10-CM

## 2017-08-01 DIAGNOSIS — Z79.01 LONG-TERM (CURRENT) USE OF ANTICOAGULANTS: ICD-10-CM

## 2017-08-01 PROCEDURE — 99207 ZZC NO CHARGE NURSE ONLY: CPT | Performed by: INTERNAL MEDICINE

## 2017-08-01 NOTE — PROGRESS NOTES
ANTICOAGULATION FOLLOW-UP CLINIC VISIT    Patient Name:  Malgorzata Lan  Date:  8/1/2017  Contact Type:  Telephone/ Left detailed VM for Malgorzata on home phone with dosing instruction and recheck date. Asked that patient call clinic back with questions.     SUBJECTIVE:        OBJECTIVE    INR   Date Value Ref Range Status   07/31/2017 3.8  Final       ASSESSMENT / PLAN  INR assessment SUPRA    Recheck INR In: 1 WEEK    INR Location Home INR      Anticoagulation Summary as of 8/1/2017     INR goal 2.0-3.0   Today's INR 3.8! (7/31/2017)   Maintenance plan 2.5 mg (2.5 mg x 1) on Mon, Thu; 5 mg (2.5 mg x 2) all other days   Full instructions 8/2: 2.5 mg; 8/3: 5 mg; 8/4: 2.5 mg; Otherwise 2.5 mg on Mon, Thu; 5 mg all other days   Weekly total 30 mg   Plan last modified Starla Sue RN (7/19/2017)   Next INR check 8/7/2017   Priority INR   Target end date     Indications   Long-term (current) use of anticoagulants [Z79.01] [Z79.01]  Atrial fibrillation (H) [I48.91]         Anticoagulation Episode Summary     INR check location Home Draw    Preferred lab     Send INR reminders to  ANTICOAGULATION    Comments [facility] Home INR      Anticoagulation Care Providers     Provider Role Specialty Phone number    Reed, Michelle Crow MD [facility] [phone]            See the Encounter Report to view Anticoagulation Flowsheet and Dosing Calendar (Go to Encounters tab in chart review, and find the Anticoagulation Therapy Visit)    Dosage adjustment made based on physician directed care plan.  Left detailed VM for Malgorzata on home phone with dosing instruction and recheck date. Asked that patient call clinic back with questions.  Will have patient do 2.5 mg MonWedFri; 5 mg AOD   Recheck on Monday 8.7.17    Britt Jacobo RN

## 2017-08-01 NOTE — MR AVS SNAPSHOT
Malgorzata Lan   8/1/2017   Anticoagulation Therapy Visit    Description:  77 year old female   Provider:  Clair Michelle DO   Department:  [facility] Nurse           INR as of 8/1/2017     Today's INR 3.8! (7/31/2017)      Anticoagulation Summary as of 8/1/2017     INR goal 2.0-3.0   Today's INR 3.8! (7/31/2017)   Full instructions 8/2: 2.5 mg; 8/3: 5 mg; 8/4: 2.5 mg; Otherwise 2.5 mg on Mon, Thu; 5 mg all other days   Next INR check 8/7/2017    Indications   Long-term (current) use of anticoagulants [Z79.01] [Z79.01]  Atrial fibrillation (H) [I48.91]         Contact Numbers     Clinic Number:         August 2017 Details    Sun Mon Tue Wed Thu Fri Sat       1      5 mg   See details      2      2.5 mg         3      5 mg         4      2.5 mg         5      5 mg           6      5 mg         7            8               9               10               11               12                 13               14               15               16               17               18               19                 20               21               22               23               24               25               26                 27               28               29               30               31                  Date Details   08/01 This INR check       Date of next INR:  8/7/2017         How to take your warfarin dose     To take:  2.5 mg Take 1 of the 2.5 mg tablets.    To take:  5 mg Take 2 of the 2.5 mg tablets.

## 2017-08-01 NOTE — PROGRESS NOTES
Attempted to reach patient 1 more time.   Left 2nd  with dosing instructions and recheck date.   Told patient to only call clinic back if she has questions regarding dosing instructions.   Advised rechecking INR in 1 week (8/8/17)    Britt Jacobo RN

## 2017-08-02 DIAGNOSIS — R42 VERTIGO: ICD-10-CM

## 2017-08-03 RX ORDER — MECLIZINE HYDROCHLORIDE 25 MG/1
TABLET ORAL
Qty: 30 TABLET | Refills: 0 | Status: SHIPPED | OUTPATIENT
Start: 2017-08-03 | End: 2017-08-09

## 2017-08-03 NOTE — TELEPHONE ENCOUNTER
Pending Prescriptions:                       Disp   Refills    meclizine (ANTIVERT) 25 MG tablet [Pharma*180 ta*0            Sig: TAKE 1 TABLET(25 MG) BY MOUTH THREE TIMES DAILY           AS NEEDED FOR DIZZINESS          Last Written Prescription Date: 4/26/17  Last Fill Quantity: 180,  # refills: 0   Last Office Visit with [facility], [facility] or [facility] prescribing provider: 4/11/17 Miguel Angel                                         Next 5 appointments (look out 90 days)     Aug 09, 2017 12:30 PM CDT   Office Visit with Clair Michelle DO   [facility] ([facility])    [address] [facility] [address]   [phone]            Aug 28, 2017 11:00 AM CDT   Return Visit with Yandel Fair MD   [facility] [place] ([facility])    [address] [facility] [address]  [facility] [address]   [phone]                    /jr

## 2017-08-03 NOTE — TELEPHONE ENCOUNTER
Prescription approved per [facility] Ref[place] Protocol.  Jami Washington RN

## 2017-08-04 DIAGNOSIS — Z79.4 TYPE 2 DIABETES MELLITUS WITH DIABETIC NEPHROPATHY, WITH LONG-TERM CURRENT USE OF INSULIN (H): Primary | Chronic | ICD-10-CM

## 2017-08-04 DIAGNOSIS — I42.9 CARDIOMYOPATHY (H): ICD-10-CM

## 2017-08-04 DIAGNOSIS — R32 UNSPECIFIED URINARY INCONTINENCE: ICD-10-CM

## 2017-08-04 DIAGNOSIS — E78.2 MIXED HYPERLIPIDEMIA: ICD-10-CM

## 2017-08-04 DIAGNOSIS — E11.21 TYPE 2 DIABETES MELLITUS WITH DIABETIC NEPHROPATHY, WITH LONG-TERM CURRENT USE OF INSULIN (H): Primary | Chronic | ICD-10-CM

## 2017-08-05 NOTE — TELEPHONE ENCOUNTER
oxybutynin (DITROPAN) 5 MG tablet      Last Written Prescription Date: 4/11/17  Last Fill Quantity: 90,  # refills: 1   Last Office Visit with [facility], P or [facility] prescribing provider: 4/11/17                                         Next 5 appointments (look out 90 days)     Aug 09, 2017 12:30 PM CDT   Office Visit with Clair Michelle DO   [facility]    [address] [facility] [address]   [phone]            Aug 28, 2017 11:00 AM CDT   Return Visit with Yandel Fair MD   [facility] [place] ([facility])    [address] [address]   [phone]                    Yesenia Sun RT(R)

## 2017-08-05 NOTE — TELEPHONE ENCOUNTER
atorvastatin (LIPITOR) 40 MG tablet     Last Written Prescription Date: 8/26/16  Last Fill Quantity: 90, # refills: 3  Last Office Visit with [facility] or  Health prescribing provider: 4/11/17  Next 5 appointments (look out 90 days)     Aug 09, 2017 12:30 PM CDT   Office Visit with Clair Michelle DO   [facility] ([facility])    [address] [facility] [address]   [phone]            Aug 28, 2017 11:00 AM CDT   Return Visit with Yandel Fair MD   [facility] [place] ([facility])    [address]  [address]  [place] [address] [phone]                   Lab Results   Component Value Date    CHOL 105 06/08/2016     Lab Results   Component Value Date    HDL 44 06/08/2016     Lab Results   Component Value Date    LDL 37 06/08/2016     Lab Results   Component Value Date    TRIG 122 06/08/2016     Lab Results   Component Value Date    CHOLHDLRATIO 3.2 03/13/2014             lisinopril (PRINIVIL/ZESTRIL) 5 MG tablet      Last Written Prescription Date: 3/10/17  Last Fill Quantity: 90, # refills: 0  Last Office Visit with [facility], [facility] or  Health prescribing provider: 4/11/17  Next 5 appointments (look out 90 days)     Aug 09, 2017 12:30 PM CDT   Office Visit with Clair Michelle DO   [facility])    [address] [facility] [address]   [phone]            Aug 28, 2017 11:00 AM CDT   Return Visit with Yandel Fair MD   [facility] [place] ([facility])    [address]  [address]  [facility] [address] [phone]                   Potassium   Date Value Ref Range Status   04/11/2017 4.0 3.4 - 5.3 mmol/L Final     Creatinine   Date Value Ref Range Status   04/11/2017 1.02 0.52 - 1.04 mg/dL Final     BP Readings from Last 3 Encounters:   07/31/17 128/76   06/07/17 110/58   04/11/17 112/68

## 2017-08-07 RX ORDER — OXYBUTYNIN CHLORIDE 5 MG/1
TABLET ORAL
Qty: 180 TABLET | Refills: 1 | Status: SHIPPED | OUTPATIENT
Start: 2017-08-07 | End: 2017-08-09 | Stop reason: DRUGHIGH

## 2017-08-07 RX ORDER — ATORVASTATIN CALCIUM 40 MG/1
TABLET, FILM COATED ORAL
Qty: 90 TABLET | Refills: 0 | Status: SHIPPED | OUTPATIENT
Start: 2017-08-07 | End: 2017-09-03

## 2017-08-07 RX ORDER — LISINOPRIL 5 MG/1
TABLET ORAL
Qty: 90 TABLET | Refills: 0 | Status: SHIPPED | OUTPATIENT
Start: 2017-08-07 | End: 2017-09-03

## 2017-08-07 NOTE — TELEPHONE ENCOUNTER
Routing refill request to provider for review/approval because:  Labs not current:  FLP    Pt will be coming in for OV on 8/9/17. Due for FLP.     Pamela Bosch RN

## 2017-08-07 NOTE — TELEPHONE ENCOUNTER
Prescription approved per [facility] Refill Protocol.    Pamela Bosch RN

## 2017-08-08 ENCOUNTER — ANTICOAGULATION THERAPY VISIT (OUTPATIENT)
Dept: NURSING | Facility: CLINIC | Age: 77
End: 2017-08-08

## 2017-08-08 ENCOUNTER — TELEPHONE (OUTPATIENT)
Dept: FAMILY MEDICINE | Facility: CLINIC | Age: 77
End: 2017-08-08

## 2017-08-08 DIAGNOSIS — I48.91 ATRIAL FIBRILLATION (H): ICD-10-CM

## 2017-08-08 DIAGNOSIS — Z79.01 LONG-TERM (CURRENT) USE OF ANTICOAGULANTS: ICD-10-CM

## 2017-08-08 LAB — INR PPP: 1.9

## 2017-08-08 NOTE — TELEPHONE ENCOUNTER
Reason for Call:  Request for results:    Name of test or procedure: INR Result     Date of test of procedure: 08/08/2017    Location of the test or procedure: Home    OK to leave the result message on voice mail or with a family member? YES    Phone number Patient can be reached at:  Home number on file [phone] (home)    Additional comments: Pt called this morning to report her INR result to the nurse's of Dr. Clair Michelle. Please give pt a call back if there are any questions or concerns. Thank you.    Call taken on 8/8/2017 at 11:35 AM by Nathalia Candelario

## 2017-08-08 NOTE — PROGRESS NOTES
ANTICOAGULATION FOLLOW-UP CLINIC VISIT    Patient Name:  Malgorzata Lan  Date:  8/8/2017  Contact Type:  Telephone/ Verbal orders called to patient at home.    SUBJECTIVE:     Patient Findings     Positives No Problem Findings           OBJECTIVE    INR   Date Value Ref Range Status   08/08/2017 1.9  Final       ASSESSMENT / PLAN  INR assessment THER    Recheck INR In: 1 WEEK    INR Location Home INR    Billed home INR No      Anticoagulation Summary as of 8/8/2017     INR goal 2.0-3.0   Today's INR 1.9!   Maintenance plan 2.5 mg (2.5 mg x 1) on Mon, Thu; 5 mg (2.5 mg x 2) all other days   Full instructions 8/10: 5 mg; Otherwise 2.5 mg on Mon, Thu; 5 mg all other days   Weekly total 30 mg   Plan last modified Starla Sue RN (7/19/2017)   Next INR check 8/15/2017   Priority INR   Target end date     Indications   Long-term (current) use of anticoagulants [Z79.01] [Z79.01]  Atrial fibrillation (H) [I48.91]         Anticoagulation Episode Summary     INR check location Home Draw    Preferred lab     Send INR reminders to  ANTICOAGULATION    Comments [facility] Home INR      Anticoagulation Care Providers     Provider Role Specialty Phone number    Derek Michelle Crow MD [facility] [phone]            See the Encounter Report to view Anticoagulation Flowsheet and Dosing Calendar (Go to Encounters tab in chart review, and find the Anticoagulation Therapy Visit)    Dosage adjustment made based on physician directed care plan.    Olive Reyes RN

## 2017-08-08 NOTE — MR AVS SNAPSHOT
Malgorzata Lan   8/8/2017   Anticoagulation Therapy Visit    Description:  77 year old female   Provider:  Clair Michelle DO   Department:  [facility] Nurse           INR as of 8/8/2017     Today's INR 1.9!      Anticoagulation Summary as of 8/8/2017     INR goal 2.0-3.0   Today's INR 1.9!   Full instructions 8/10: 5 mg; Otherwise 2.5 mg on Mon, Thu; 5 mg all other days   Next INR check 8/15/2017    Indications   Long-term (current) use of anticoagulants [Z79.01] [Z79.01]  Atrial fibrillation (H) [I48.91]         Description     Verbal orders called to patient.  See flow sheet.      Contact Numbers     Clinic Number:         August 2017 Details    Sun Mon Tue Wed Thu Fri Sat       1               2               3               4               5                 6               7               8      5 mg   See details      9      5 mg         10      5 mg         11      5 mg         12      5 mg           13      5 mg         14      2.5 mg         15            16               17               18               19                 20               21               22               23               24               25               26                 27               28               29               30               31                  Date Details   08/08 This INR check       Date of next INR:  8/15/2017         How to take your warfarin dose     To take:  2.5 mg Take 1 of the 2.5 mg tablets.    To take:  5 mg Take 2 of the 2.5 mg tablets.

## 2017-08-09 ENCOUNTER — OFFICE VISIT (OUTPATIENT)
Dept: FAMILY MEDICINE | Facility: CLINIC | Age: 77
End: 2017-08-09
Payer: MEDICARE

## 2017-08-09 ENCOUNTER — TELEPHONE (OUTPATIENT)
Dept: FAMILY MEDICINE | Facility: CLINIC | Age: 77
End: 2017-08-09

## 2017-08-09 VITALS
WEIGHT: 270 LBS | DIASTOLIC BLOOD PRESSURE: 58 MMHG | SYSTOLIC BLOOD PRESSURE: 100 MMHG | TEMPERATURE: 97.6 F | OXYGEN SATURATION: 96 % | HEART RATE: 75 BPM | HEIGHT: 65 IN | BODY MASS INDEX: 44.98 KG/M2

## 2017-08-09 DIAGNOSIS — E66.2 MORBID OBESITY WITH ALVEOLAR HYPOVENTILATION (H): ICD-10-CM

## 2017-08-09 DIAGNOSIS — I48.20 CHRONIC ATRIAL FIBRILLATION (H): ICD-10-CM

## 2017-08-09 DIAGNOSIS — E78.2 MIXED HYPERLIPIDEMIA: ICD-10-CM

## 2017-08-09 DIAGNOSIS — I51.89 DIASTOLIC DYSFUNCTION: Primary | Chronic | ICD-10-CM

## 2017-08-09 DIAGNOSIS — S81.802D NON-HEALING WOUND OF LOWER EXTREMITY, LEFT, SUBSEQUENT ENCOUNTER: ICD-10-CM

## 2017-08-09 DIAGNOSIS — Z79.4 TYPE 2 DIABETES MELLITUS WITH DIABETIC NEPHROPATHY, WITH LONG-TERM CURRENT USE OF INSULIN (H): Chronic | ICD-10-CM

## 2017-08-09 DIAGNOSIS — D61.818 PANCYTOPENIA (H): Chronic | ICD-10-CM

## 2017-08-09 DIAGNOSIS — R32 URINARY INCONTINENCE, UNSPECIFIED TYPE: Chronic | ICD-10-CM

## 2017-08-09 DIAGNOSIS — I73.9 PERIPHERAL VASCULAR DISEASE (H): ICD-10-CM

## 2017-08-09 DIAGNOSIS — E11.21 TYPE 2 DIABETES MELLITUS WITH DIABETIC NEPHROPATHY, WITH LONG-TERM CURRENT USE OF INSULIN (H): Chronic | ICD-10-CM

## 2017-08-09 DIAGNOSIS — C34.90: Chronic | ICD-10-CM

## 2017-08-09 DIAGNOSIS — M85.80 OSTEOPENIA, UNSPECIFIED LOCATION: Chronic | ICD-10-CM

## 2017-08-09 DIAGNOSIS — R09.81 NASAL CONGESTION: ICD-10-CM

## 2017-08-09 DIAGNOSIS — R42 VERTIGO: ICD-10-CM

## 2017-08-09 PROCEDURE — 99214 OFFICE O/P EST MOD 30 MIN: CPT | Performed by: INTERNAL MEDICINE

## 2017-08-09 RX ORDER — POTASSIUM CHLORIDE 1500 MG/1
20 TABLET, EXTENDED RELEASE ORAL 2 TIMES DAILY
Qty: 180 TABLET | Refills: 3 | Status: SHIPPED | OUTPATIENT
Start: 2017-08-09 | End: 2018-01-23

## 2017-08-09 RX ORDER — HUMAN INSULIN 100 [USP'U]/ML
30 INJECTION, SUSPENSION SUBCUTANEOUS 2 TIMES DAILY WITH MEALS
Qty: 10 ML | Refills: 11 | Status: CANCELLED | OUTPATIENT
Start: 2017-08-09

## 2017-08-09 RX ORDER — ALBUTEROL SULFATE 5 MG/ML
2.5 SOLUTION RESPIRATORY (INHALATION) EVERY 4 HOURS PRN
Qty: 60 VIAL | Refills: 1 | Status: CANCELLED | OUTPATIENT
Start: 2017-08-09

## 2017-08-09 RX ORDER — MECLIZINE HYDROCHLORIDE 25 MG/1
TABLET ORAL
Qty: 270 TABLET | Refills: 3 | Status: SHIPPED | OUTPATIENT
Start: 2017-08-09

## 2017-08-09 RX ORDER — ALENDRONATE SODIUM 70 MG/1
70 TABLET ORAL
Qty: 12 TABLET | Refills: 3 | Status: SHIPPED | OUTPATIENT
Start: 2017-08-09

## 2017-08-09 RX ORDER — WARFARIN SODIUM 2.5 MG/1
TABLET ORAL
Qty: 100 TABLET | Refills: 3 | Status: SHIPPED | OUTPATIENT
Start: 2017-08-09 | End: 2018-01-26

## 2017-08-09 RX ORDER — FLUTICASONE PROPIONATE 50 MCG
1-2 SPRAY, SUSPENSION (ML) NASAL DAILY
Qty: 3 BOTTLE | Refills: 3 | Status: CANCELLED | OUTPATIENT
Start: 2017-08-09

## 2017-08-09 RX ORDER — WARFARIN SODIUM 2.5 MG/1
TABLET ORAL
Qty: 100 TABLET | Refills: 3 | Status: CANCELLED | OUTPATIENT
Start: 2017-08-09

## 2017-08-09 RX ORDER — PENTOXIFYLLINE 400 MG/1
TABLET, EXTENDED RELEASE ORAL
Qty: 270 TABLET | Refills: 3 | Status: SHIPPED | OUTPATIENT
Start: 2017-08-09

## 2017-08-09 RX ORDER — FLUTICASONE PROPIONATE 50 MCG
1-2 SPRAY, SUSPENSION (ML) NASAL DAILY
Qty: 3 BOTTLE | Refills: 3 | Status: SHIPPED | OUTPATIENT
Start: 2017-08-09

## 2017-08-09 RX ORDER — NITROGLYCERIN 0.4 MG/1
TABLET SUBLINGUAL
Qty: 30 TABLET | Refills: 3 | Status: CANCELLED | OUTPATIENT
Start: 2017-08-09

## 2017-08-09 RX ORDER — OXYBUTYNIN CHLORIDE 5 MG/1
5 TABLET ORAL DAILY
Qty: 90 TABLET | Refills: 3 | Status: SHIPPED | OUTPATIENT
Start: 2017-08-09

## 2017-08-09 RX ORDER — OXYBUTYNIN CHLORIDE 5 MG/1
5 TABLET ORAL DAILY
COMMUNITY
End: 2017-08-09

## 2017-08-09 NOTE — PATIENT INSTRUCTIONS
Prescriptions refilled     Stop calcitonin nasal spray   Start Fosamax once a week with lots of water an hour before breakfast    Follow up with me in 3-6 months or sooner for a pre-op

## 2017-08-09 NOTE — MR AVS SNAPSHOT
After Visit Summary   8/9/2017    Malgorzata Lan    MRN: [id number]           Patient Information     Date Of Birth          1940        Visit Information        Provider Department      8/9/2017 12:30 PM Clair Michelle,  [facility]        Today's Diagnoses     Diastolic dysfunction    -  1    Peripheral vascular disease (H)        Chronic atrial fibrillation (H)        Pancytopenia due to MGUS        Osteopenia, unspecified location        Morbid obesity with alveolar hypoventilation (H)        Vertigo        Nasal congestion        Urinary incontinence, unspecified type        Squamous cell carcinoma of lung, stage I, unspecified laterality (H)        Non-healing wound of lower extremity, left, subsequent encounter          Care Instructions    Prescriptions refilled     Stop calcitonin nasal spray   Start Fosamax once a week with lots of water an hour before breakfast    Follow up with me in 3-6 months or sooner for a pre-op          Follow-ups after your visit        Your next 10 appointments already scheduled     Aug 28, 2017 11:00 AM CDT   Return Visit with Yandel Fair MD   [facility] [place] ([facility])    [address]  [address]  [place] [address] [phone]            Sep 20, 2017  1:30 PM CDT   Remote PPM Check with BUCIO TECH1   [facility] [place] ([facility])    [address]  [place] [address] [phone]           This appointment is for a remote check of your pacemaker.  This is not an appointment at the office.              If Something Goes Wrong     I will check my blood sugars twice a day     Notes - Note created  7/17/2014 10:25 AM by Jeannine Baker RN    As of today's date 7/17/2014 goal is met at 76 - 100%.   Goal Status:  Active        Who to contact     If you have questions or need follow up information about today's clinic  "visit or your schedule please contact [facility] directly at [phone].  Normal or non-critical lab and imaging results will be communicated to you by MyChart, letter or phone within 4 business days after the clinic has received the results. If you do not hear from us within 7 days, please contact the clinic through MyChart or phone. If you have a critical or abnormal lab result, we will notify you by phone as soon as possible.  Submit refill requests through [employer] or call your pharmacy and they will forward the refill request to us. Please allow 3 business days for your refill to be completed.          Additional Information About Your Visit        Care EveryWhere ID     This is your Care EveryWhere ID. This could be used by other organizations to access your [place] medical records  [id number]        Your Vitals Were     Pulse Temperature Height Last Period Pulse Oximetry BMI (Body Mass Index)    75 97.6  F (36.4  C) (Oral) 5' 5\" (1.651 m) (LMP Unknown) 96% 44.93 kg/m2       Blood Pressure from Last 3 Encounters:   08/09/17 100/58   07/31/17 128/76   06/07/17 110/58    Weight from Last 3 Encounters:   08/09/17 270 lb (122.5 kg)   06/07/17 272 lb 11.2 oz (123.7 kg)   04/11/17 286 lb (129.7 kg)              Today, you had the following     No orders found for display         Today's Medication Changes          These changes are accurate as of: 8/9/17  1:10 PM.  If you have any questions, ask your nurse or doctor.               Start taking these medicines.        Dose/Directions    alendronate 70 MG tablet   Commonly known as:  FOSAMAX   Used for:  Osteopenia, unspecified location   Started by:  Clair Michelle DO        Dose:  70 mg   Take 1 tablet (70 mg) by mouth every 7 days Swallow with a full glass of water an hour before breakfast and stay upright afterwards.   Quantity:  12 tablet   Refills:  3         These medicines have changed or have updated prescriptions.        Dose/Directions "    JANTOVEN 2.5 MG tablet   This may have changed:  additional instructions   Used for:  Chronic atrial fibrillation (H)   Generic drug:  warfarin   Changed by:  Clair Michelle DO        Take 1-2 tablets daily or as directed by INR clinic   Quantity:  100 tablet   Refills:  3       * Lidocaine 4 % Gel   This may have changed:  Another medication with the same name was added. Make sure you understand how and when to take each.   Used for:  Non-healing wound of lower extremity, left, subsequent encounter   Changed by:  Clair Michelle DO        Externally apply topically every 3 days Prior to dressing changes.   Quantity:  30 g   Refills:  2       * Lidocaine 0.5 % Aero   This may have changed:  You were already taking a medication with the same name, and this prescription was added. Make sure you understand how and when to take each.   Used for:  Non-healing wound of lower extremity, left, subsequent encounter   Changed by:  Clair Michelle DO        Apply as needed prior to dressing changes   Quantity:  170 g   Refills:  1       meclizine 25 MG tablet   Commonly known as:  ANTIVERT   This may have changed:  See the new instructions.   Used for:  Vertigo   Changed by:  Clair Michelle DO        TAKE 1 TABLET(25 MG) BY MOUTH THREE TIMES DAILY AS NEEDED FOR DIZZINESS   Quantity:  270 tablet   Refills:  3       oxybutynin 5 MG tablet   Commonly known as:  DITROPAN   This may have changed:  Another medication with the same name was removed. Continue taking this medication, and follow the directions you see here.   Used for:  Urinary incontinence, unspecified type   Changed by:  Clair Michelle DO        Dose:  5 mg   Take 1 tablet (5 mg) by mouth daily   Quantity:  90 tablet   Refills:  3       pentoxifylline 400 MG CR tablet   Commonly known as:  TRENtal   This may have changed:  See the new instructions.   Used for:  Peripheral vascular disease (H)   Changed by:  Clair Michelle DO        TAKE 1  TABLET(400 MG) BY MOUTH THREE TIMES DAILY WITH MEALS   Quantity:  270 tablet   Refills:  3       * Notice:  This list has 2 medication(s) that are the same as other medications prescribed for you. Read the directions carefully, and ask your doctor or other care provider to review them with you.         Where to get your medicines      [facility] [id number] [place] [address]  [address], [place] [facility] [address]     Phone:  [phone]     alendronate 70 MG tablet    fluticasone 50 MCG/ACT spray    JANTOVEN 2.5 MG tablet    Lidocaine 0.5 % Aero    meclizine 25 MG tablet    oxybutynin 5 MG tablet    pentoxifylline 400 MG CR tablet    potassium chloride SA 20 MEQ CR tablet                Primary Care Provider Office Phone # Fax #    Clair Michelle, [phone] [address] [facility] [address]  [facility] [place] [address]        Goals        General    I will call my PCP right away if I have increased shortness of breath.   (pt-stated)     Notes - Note created  7/17/2014 10:27 AM by Jeannine Baker RN    As of today's date 7/17/2014 goal is met at 51 - 75%.   Goal Status:  Active      I will weigh myself daily and records results (pt-stated)     Notes - Note created  7/17/2014 10:25 AM by Jeannine Baker RN    As of today's date 7/17/2014 goal is met at 76 - 100%.   Goal Status:  Active        Equal Access to Services     [facility] AH: Hadii aravind ku hadasho Soomaali, waaxda luqadaha, qaybta kaalmada adeegyada, emmanuel idiin hayaan adehiram su. So [facility] [phone].    ATENCIÓN: Si habla español, tiene a bergeron disposición servicios gratuitos de asistencia lingüística. Llame al [phone].    We comply with applicable federal civil rights laws and [place] laws. We do not discriminate on the basis of race, color, national origin, age, disability sex, sexual orientation or gender identity.            Thank you!     Thank  you for choosing [facility]  for your care. Our goal is always to provide you with excellent care. Hearing back from our patients is one way we can continue to improve our services. Please take a few minutes to complete the written survey that you may receive in the mail after your visit with us. Thank you!             Your Updated Medication List - Protect others around you: Learn how to safely use, store and throw away your medicines at www.disposemymeds.org.          This list is accurate as of: 8/9/17  1:10 PM.  Always use your most recent med list.                   Brand Name Dispense Instructions for use Diagnosis    acetaminophen 650 MG 8 hour tablet     100 tablet    Take 650 mg by mouth every 6 hours as needed for mild pain    S/P hysterectomy with oophorectomy       albuterol (5 MG/ML) 0.5% neb solution    PROVENTIL    60 vial    Take 0.5 mLs (2.5 mg) by nebulization every 4 hours as needed for wheezing or shortness of breath / dyspnea (dyspnea)    COPD exacerbation (H)       alendronate 70 MG tablet    FOSAMAX    12 tablet    Take 1 tablet (70 mg) by mouth every 7 days Swallow with a full glass of water an hour before breakfast and stay upright afterwards.    Osteopenia, unspecified location       alum & mag hydroxide-simethicone 400-400-40 MG/5ML Susp suspension    MYLANTA ES/MAALOX  ES     Take 15-30 mLs by mouth every 4 hours as needed for indigestion    S/P hysterectomy with oophorectomy       ASPIRIN NOT PRESCRIBED    INTENTIONAL    0 each    Antiplatelet medication not prescribed intentionally due to Current anticoagulant therapy (warfarin/enoxaparin)    Type 2 diabetes mellitus with diabetic nephropathy, with long-term current use of insulin (H)       atorvastatin 40 MG tablet    LIPITOR    90 tablet    TAKE 1 TABLET(40 MG) BY MOUTH AT BEDTIME    Mixed hyperlipidemia       betamethasone valerate 0.1 % ointment    VALISONE    15 g    Apply topically daily as needed To inside of ears.     Dermatitis       blood glucose monitoring test strip    no brand specified    3 Month    Use to test blood sugars 3 -4  times daily or as directed        budesonide 0.5 MG/2ML neb solution    PULMICORT    60 ampule    Take 2 mLs (0.5 mg) by nebulization 2 times daily as needed    COPD exacerbation (H)       carvedilol 6.25 MG tablet    COREG    180 tablet    Take 2 tablets (12.5 mg) by mouth 2 times daily (with meals)    Chronic atrial fibrillation (H), Congestive heart failure, unspecified congestive heart failure chronicity, unspecified congestive heart failure type (H)       CINNAMON PO      Take 1,000 mg by mouth daily        cod liver oil Caps capsule      Take 1 capsule by mouth daily        digoxin 125 MCG tablet    LANOXIN    90 tablet    Take 1 tablet (125 mcg) by mouth daily    Atrial fibrillation (H)       fluticasone 50 MCG/ACT spray    FLONASE    3 Bottle    Spray 1-2 sprays into both nostrils daily    Nasal congestion       JANTOVEN 2.5 MG tablet   Generic drug:  warfarin     100 tablet    Take 1-2 tablets daily or as directed by INR clinic    Chronic atrial fibrillation (H)       latanoprost 0.005 % ophthalmic solution    XALATAN     Place 1 drop into both eyes At Bedtime.        * Lidocaine 4 % Gel     30 g    Externally apply topically every 3 days Prior to dressing changes.    Non-healing wound of lower extremity, left, subsequent encounter       * Lidocaine 0.5 % Aero     170 g    Apply as needed prior to dressing changes    Non-healing wound of lower extremity, left, subsequent encounter       lisinopril 5 MG tablet    PRINIVIL/ZESTRIL    90 tablet    TAKE 1 TABLET BY MOUTH DAILY    Type 2 diabetes mellitus with diabetic nephropathy, with long-term current use of insulin (H)       meclizine 25 MG tablet    ANTIVERT    270 tablet    TAKE 1 TABLET(25 MG) BY MOUTH THREE TIMES DAILY AS NEEDED FOR DIZZINESS    Vertigo       nitroGLYcerin 0.4 MG sublingual tablet    NITROSTAT    30 tablet    If chest  pain place under tongue call 911 if pain not resolved/ repeat every 5 min for total of 3 doses    Congestive heart failure, unspecified congestive heart failure chronicity, unspecified congestive heart failure type (H)       NovoLIN MIX 70/30 VIAL injection   Generic drug:  insulin NPH-insulin regular     10 mL    Inject 30 Units Subcutaneous 2 times daily (with meals)    Type 2 diabetes mellitus with stage 3 chronic kidney disease (H)       oxybutynin 5 MG tablet    DITROPAN    90 tablet    Take 1 tablet (5 mg) by mouth daily    Urinary incontinence, unspecified type       pentoxifylline 400 MG CR tablet    TRENtal    270 tablet    TAKE 1 TABLET(400 MG) BY MOUTH THREE TIMES DAILY WITH MEALS    Peripheral vascular disease (H)       polyethylene glycol Packet    MIRALAX/GLYCOLAX     Take 1 packet by mouth 2 times daily as needed for constipation        potassium chloride SA 20 MEQ CR tablet    K-DUR/KLOR-CON M    180 tablet    Take 1 tablet (20 mEq) by mouth 2 times daily    Diastolic dysfunction       torsemide 10 MG tablet    DEMADEX    180 tablet    Take 2 tablets (20 mg) by mouth At Bedtime    CHF (congestive heart failure) (H)       traMADol 50 MG tablet    ULTRAM    240 tablet    TAKE 1-2 TABLET BY MOUTH EVERY 6 HOURS AS NEEDED FOR PAIN    Chronic pain syndrome       Vitamin B 12 100 MCG Lozg     100 lozenge    Take 1,000 mcg by mouth daily    Vitamin B12 deficiency (non anemic)       vitamin D3 2000 UNITS Caps     90 capsule    Take 2,000 Units by mouth daily    Osteoporosis       * Notice:  This list has 2 medication(s) that are the same as other medications prescribed for you. Read the directions carefully, and ask your doctor or other care provider to review them with you.

## 2017-08-09 NOTE — NURSING NOTE
"Chief Complaint   Patient presents with     Follow Up For     medication check/refills     Diabetes       Initial /58 (BP Location: Left arm, Patient Position: Chair, Cuff Size: Adult Large)  Pulse 75  Temp 97.6  F (36.4  C) (Oral)  Ht 5' 5\" (1.651 m)  Wt 270 lb (122.5 kg)  LMP  (LMP Unknown)  SpO2 96%  BMI 44.93 kg/m2 Estimated body mass index is 44.93 kg/(m^2) as calculated from the following:    Height as of this encounter: 5' 5\" (1.651 m).    Weight as of this encounter: 270 lb (122.5 kg).  Medication Reconciliation: complete   Deja Felder MA  "

## 2017-08-09 NOTE — PROGRESS NOTES
SUBJECTIVE:                                                    Malgorzata Lan is a 77 year old female who presents to clinic today for the following health issues:      Diabetes Follow-up    Patient is checking blood sugars: three times daily before meals    Diabetic concerns: None     Symptoms of hypoglycemia (low blood sugar): dizzy     Paresthesias (numbness or burning in feet) or sores: Yes     Date of last diabetic eye exam: 2016    Hyperlipidemia Follow-Up      Rate your low fat/cholesterol diet?: good    Taking statin?  Yes, no muscle aches from statin    Other lipid medications/supplements?:  none    Hypertension Follow-up      Outpatient blood pressures are being checked at home.     Low Salt Diet: no added salt      Amount of exercise or physical activity: weights upper body    Problems taking medications regularly: No    Medication side effects: none    Diet: low salt    Dx with left sided lung cancer, she will eventually have surgery for removal. She is s/p surgery for removal of right sided lung cancer as well.  She is also likely going to need to have a skin graft placed on left LE open wound. Dr. Fair would be performing this surgery.  She does note some new skin is coming through on her wound and she was very excited about this. Still has HomeCare/wound care helping her which is appreciated.      She is on nightly O2 that she attaches to her sleep apnea mask. She doesn't do this every night, only when she is having a hard time breathing. She has an oximeter at home to check O2 saturation. O2 through [facility].   Does not use nebulizer frequently.     Not aware of any other bone density medication, other than calcitonin spray, in the past.  She has been doing protein shakes and lost 25 pounds. Is in motorized WC today.   Still taking BID Meclizine since she had a concussion in the past and she feels this is helpful.   Tramadol BID for left leg pain.     Dermatology since last OV who reassured  "her on lesions being non-concerning.        Problem list and histories reviewed & adjusted, as indicated.    ROS:  Detailed as above     This document serves as a record of the services and decisions personally performed and made by Clair Michelle DO. It was created on his/her behalf by Shahla Martin, a trained medical scribe. The creation of this document is based the provider's statements to the medical scribe.  Scribe Shahla Martin 12:46 PM, August 9, 2017   OBJECTIVE:   /58 (BP Location: Left arm, Patient Position: Chair, Cuff Size: Adult Large)  Pulse 75  Temp 97.6  F (36.4  C) (Oral)  Ht 5' 5\" (1.651 m)  Wt 270 lb (122.5 kg)  LMP  (LMP Unknown)  SpO2 96%  BMI 44.93 kg/m2  Body mass index is 44.93 kg/(m^2).  EXAM:  Alert, pleasant, cooperative, NAD, sitting in electric wheelchair, morbidly obese   Bilateral REGINA with venous stasis, bandages on left shin/calf  Normal affect     ASSESSMENT/PLAN:   1. Diastolic dysfunction  Stable per recent OV with Dr. Shine  - potassium chloride SA (K-DUR/KLOR-CON M) 20 MEQ CR tablet; Take 1 tablet (20 mEq) by mouth 2 times daily  Dispense: 180 tablet; Refill: 3    2. Peripheral vascular disease (H)  Continues to follow with Dr. Gr  - pentoxifylline (TRENTAL) 400 MG CR tablet; TAKE 1 TABLET(400 MG) BY MOUTH THREE TIMES DAILY WITH MEALS  Dispense: 270 tablet; Refill: 3    3. Chronic atrial fibrillation (H)  - JANTOVEN 2.5 MG tablet; Take 1-2 tablets daily or as directed by INR clinic  Dispense: 100 tablet; Refill: 3    4. Pancytopenia due to MGUS  Follows with Dr. Patel    5. Osteopenia, unspecified location  D/C Calcitonin as not helping and doesn't think she has taken anything else in the past  DEXA was in May  No dental work planned  Also follows with endocrinologist Dr. Melara  - alendronate (FOSAMAX) 70 MG tablet; Take 1 tablet (70 mg) by mouth every 7 days Swallow with a full glass of water an hour before breakfast and stay upright " afterwards.  Dispense: 12 tablet; Refill: 3    6. Morbid obesity with alveolar hypoventilation (H)  She is working on this with protein [employer]    7. Vertigo  H/o past concussion   - meclizine (ANTIVERT) 25 MG tablet; TAKE 1 TABLET(25 MG) BY MOUTH THREE TIMES DAILY AS NEEDED FOR DIZZINESS  Dispense: 270 tablet; Refill: 3    8. Nasal congestion  Helpful  Also has O2 bled into CPAP at night through [employer]  - fluticasone (FLONASE) 50 MCG/ACT spray; Spray 1-2 sprays into both nostrils daily  Dispense: 3 Bottle; Refill: 3    9. Urinary incontinence, unspecified type  Still finds helpful and we did successfully decrease dose at last OV d/t side effects  - oxybutynin (DITROPAN) 5 MG tablet; Take 1 tablet (5 mg) by mouth daily  Dispense: 90 tablet; Refill: 3    10. Squamous cell carcinoma of lung, stage I, unspecified laterality (H)  S/p right side resection, now future left side resection possible - we don't have those notes    11. Non-healing wound of lower extremity, left, subsequent encounter  Possible future skin graft via Dr. Fair - appreciate wound care assistance  - Lidocaine 0.5 % AERO; Apply as needed prior to dressing changes  Dispense: 170 g; Refill: 1    Patient Instructions   Prescriptions refilled     Stop calcitonin nasal spray   Start Fosamax once a week with lots of water an hour before breakfast    Follow up with me in 3-6 months or sooner for a pre-op      The information in this document, created by the medical scribe for me, accurately reflects the services I personally performed and the decisions made by me. I have reviewed and approved this document for accuracy prior to leaving the patient care area.  Clair Michelle DO  12:46 PM, 08/09/17    Clair Michlele DO  [facility]

## 2017-08-10 NOTE — TELEPHONE ENCOUNTER
"Received fax from [facility]: \"Our pharmacy is unable to fill the Lidocaine 0/5% AERO as we do not carry this percent for Malgorzata Lan\"    Lena Garibay, RT(R)    "

## 2017-08-10 NOTE — TELEPHONE ENCOUNTER
PCP: Any other options for the Lidocaine 0.5 % AERO. Pharmacy sent fax stating that they do not carry that percent.   I called pharmacy and asked what they do carry.  Pharmacist said that they only carry patches or creams.   Looks like 7/7/17 Lidocaine 4% gel was prescribed. So patient already has gel.    Please advise.     Britt Jacobo RN

## 2017-08-10 NOTE — TELEPHONE ENCOUNTER
Please call to discuss with Malgorzata  She does have the gel for wound dressing changes but had the spray in the past and requested to have that too  Maybe she'd just be ok sticking with the gel if you let her know that her pharmacy doesn't carry the spray  Thanks!

## 2017-08-11 NOTE — TELEPHONE ENCOUNTER
Called and spoke with patient.   Informed patient that pharmacy doesn't carry the Lidocaine spray and to cont using the gel.   Patient confirmed she has the gel and understood advise.     Jen ROMERO RN,BSN

## 2017-08-15 ENCOUNTER — ANTICOAGULATION THERAPY VISIT (OUTPATIENT)
Dept: FAMILY MEDICINE | Facility: CLINIC | Age: 77
End: 2017-08-15
Payer: MEDICARE

## 2017-08-15 ENCOUNTER — TELEPHONE (OUTPATIENT)
Dept: FAMILY MEDICINE | Facility: CLINIC | Age: 77
End: 2017-08-15

## 2017-08-15 DIAGNOSIS — Z79.01 LONG-TERM (CURRENT) USE OF ANTICOAGULANTS: ICD-10-CM

## 2017-08-15 DIAGNOSIS — I48.91 ATRIAL FIBRILLATION (H): ICD-10-CM

## 2017-08-15 LAB — INR PPP: 2

## 2017-08-15 PROCEDURE — 99207 ZZC NO CHARGE NURSE ONLY: CPT | Performed by: INTERNAL MEDICINE

## 2017-08-15 NOTE — MR AVS SNAPSHOT
Malgorzata Lan   8/15/2017   Anticoagulation Therapy Visit    Description:  77 year old female   Provider:  Clair Michelle DO   Department:  [facility]           INR as of 8/15/2017     Today's INR 2.0      Anticoagulation Summary as of 8/15/2017     INR goal 2.0-3.0   Today's INR 2.0   Full instructions 2.5 mg on Mon; 5 mg all other days   Next INR check 8/24/2017    Indications   Long-term (current) use of anticoagulants [Z79.01] [Z79.01]  Atrial fibrillation (H) [I48.91]         August 2017 Details    Sun Mon Tue Wed Thu Fri Sat       1               2               3               4               5                 6               7               8               9               10               11               12                 13               14               15      5 mg   See details      16      5 mg         17      5 mg         18      5 mg         19      5 mg           20      5 mg         21      2.5 mg         22      5 mg         23      5 mg         24            25               26                 27               28               29               30               31                  Date Details   08/15 This INR check       Date of next INR:  8/24/2017         How to take your warfarin dose     To take:  2.5 mg Take 1 of the 2.5 mg tablets.    To take:  5 mg Take 2 of the 2.5 mg tablets.

## 2017-08-15 NOTE — TELEPHONE ENCOUNTER
Reason for call:  INR   Who is calling? Patient    Phone number: [phone]    Fax number: N/A    Name of caller: Malgorzata    INR Value: 2.0    Are there any other concerns: No    Can we leave a detailed message on this number?  YES

## 2017-08-15 NOTE — PROGRESS NOTES
"  ANTICOAGULATION FOLLOW-UP CLINIC VISIT    Patient Name:  Malgorzata Lan  Date:  8/15/2017  Contact Type:  Telephone    SUBJECTIVE:     Patient Findings     Positives No Problem Findings           OBJECTIVE    INR   Date Value Ref Range Status   08/15/2017 2.0  Final       ASSESSMENT / PLAN  INR assessment THER    Recheck INR In: 1 WEEK    INR Location Home INR      Anticoagulation Summary as of 8/15/2017     INR goal 2.0-3.0   Today's INR 2.0   Maintenance plan 2.5 mg (2.5 mg x 1) on Mon; 5 mg (2.5 mg x 2) all other days   Full instructions 2.5 mg on Mon; 5 mg all other days   Weekly total 32.5 mg   Plan last modified Marifer Zaragoza, RN (8/15/2017)   Next INR check 8/24/2017   Priority INR   Target end date     Indications   Long-term (current) use of anticoagulants [Z79.01] [Z79.01]  Atrial fibrillation (H) [I48.91]         Anticoagulation Episode Summary     INR check location Home Draw    Preferred lab     Send INR reminders to CS ANTICOAGULATION    Comments [facility] Home INR      Anticoagulation Care Providers     Provider Role Specialty Phone number    ReedMichelle MD [facility] [phone]            See the Encounter Report to view Anticoagulation Flowsheet and Dosing Calendar (Go to Encounters tab in chart review, and find the Anticoagulation Therapy Visit)    Dosage adjustment made based on physician directed care plan. Home INR fax: 2.0.   Reached patient. Continue same dosing and recheck in one week.  Patient has \"cancer in the other lung and also waiting for a leg skin graft\".      Marifer Zaragoza, MORRIS               "

## 2017-08-22 ENCOUNTER — TELEPHONE (OUTPATIENT)
Dept: FAMILY MEDICINE | Facility: CLINIC | Age: 77
End: 2017-08-22

## 2017-08-22 ENCOUNTER — TRANSFERRED RECORDS (OUTPATIENT)
Dept: HEALTH INFORMATION MANAGEMENT | Facility: CLINIC | Age: 77
End: 2017-08-22

## 2017-08-22 NOTE — TELEPHONE ENCOUNTER
Reason for Call:  INR    Who is calling?  Patient    INR Value:  2.6    Are there any other concerns:  No    Can we leave a detailed message on this number? YES    Phone number patient can be reached at: Home number on file [phone] (home)      Call taken on 8/22/2017 at 6:06 PM by China You

## 2017-08-23 ENCOUNTER — ANTICOAGULATION THERAPY VISIT (OUTPATIENT)
Dept: FAMILY MEDICINE | Facility: CLINIC | Age: 77
End: 2017-08-23
Payer: MEDICARE

## 2017-08-23 DIAGNOSIS — I48.91 ATRIAL FIBRILLATION (H): ICD-10-CM

## 2017-08-23 DIAGNOSIS — Z79.01 LONG-TERM (CURRENT) USE OF ANTICOAGULANTS: ICD-10-CM

## 2017-08-23 LAB — INR PPP: 2.6

## 2017-08-23 PROCEDURE — 99207 ZZC NO CHARGE NURSE ONLY: CPT | Performed by: INTERNAL MEDICINE

## 2017-08-23 NOTE — PROGRESS NOTES
ANTICOAGULATION FOLLOW-UP CLINIC VISIT    Patient Name:  Malgorzata Lan  Date:  8/23/2017  Contact Type:  Telephone/ Pt called with HOME INR results.  Called pt back and left detailed message with Warfarin dosing and next INR date.     SUBJECTIVE:        OBJECTIVE    INR   Date Value Ref Range Status   08/23/2017 2.6  Final       ASSESSMENT / PLAN  INR assessment THER    Recheck INR In: 2 WEEKS    INR Location Home INR      Anticoagulation Summary as of 8/23/2017     INR goal 2.0-3.0   Today's INR 2.6   Maintenance plan 2.5 mg (2.5 mg x 1) on Mon; 5 mg (2.5 mg x 2) all other days   Full instructions 2.5 mg on Mon; 5 mg all other days   Weekly total 32.5 mg   No change documented Jen Lowry RN   Plan last modified Marifer Zaragoza RN (8/15/2017)   Next INR check 9/5/2017   Priority INR   Target end date     Indications   Long-term (current) use of anticoagulants [Z79.01] [Z79.01]  Atrial fibrillation (H) [I48.91]         Anticoagulation Episode Summary     INR check location Home Draw    Preferred lab     Send INR reminders to  ANTICOAGULATION    Comments [facility] Home INR      Anticoagulation Care Providers     Provider Role Specialty Phone number    Derek Michelle Crow MD [facility] [phone]            See the Encounter Report to view Anticoagulation Flowsheet and Dosing Calendar (Go to Encounters tab in chart review, and find the Anticoagulation Therapy Visit)    Dosage adjustment made based on physician directed care plan.    Jen Lowry RN

## 2017-08-23 NOTE — MR AVS SNAPSHOT
Malgorzata Lan   8/23/2017   Anticoagulation Therapy Visit    Description:  77 year old female   Provider:  Clair Michelle DO   Department:  [facility]           INR as of 8/23/2017     Today's INR 2.6      Anticoagulation Summary as of 8/23/2017     INR goal 2.0-3.0   Today's INR 2.6   Full instructions 2.5 mg on Mon; 5 mg all other days   Next INR check 9/5/2017    Indications   Long-term (current) use of anticoagulants [Z79.01] [Z79.01]  Atrial fibrillation (H) [I48.91]         August 2017 Details    Sun Mon Tue Wed Thu Fri Sat       1               2               3               4               5                 6               7               8               9               10               11               12                 13               14               15               16               17               18               19                 20               21               22               23      5 mg   See details      24      5 mg         25      5 mg         26      5 mg           27      5 mg         28      2.5 mg         29      5 mg         30      5 mg         31      5 mg            Date Details   08/23 This INR check               How to take your warfarin dose     To take:  2.5 mg Take 1 of the 2.5 mg tablets.    To take:  5 mg Take 2 of the 2.5 mg tablets.           September 2017 Details    Sun Mon Tue Wed Thu Fri Sat          1      5 mg         2      5 mg           3      5 mg         4      2.5 mg         5            6               7               8               9                 10               11               12               13               14               15               16                 17               18               19               20               21               22               23                 24               25               26               27               28               29               30                Date Details   No additional  details    Date of next INR:  9/5/2017         How to take your warfarin dose     To take:  2.5 mg Take 1 of the 2.5 mg tablets.    To take:  5 mg Take 2 of the 2.5 mg tablets.

## 2017-08-28 ENCOUNTER — HOSPITAL ENCOUNTER (OUTPATIENT)
Dept: WOUND CARE | Facility: CLINIC | Age: 77
Discharge: HOME | End: 2017-08-28
Attending: SURGERY | Admitting: SURGERY
Payer: MEDICARE

## 2017-08-28 VITALS
DIASTOLIC BLOOD PRESSURE: 62 MMHG | HEART RATE: 80 BPM | RESPIRATION RATE: 18 BRPM | TEMPERATURE: 98.3 F | SYSTOLIC BLOOD PRESSURE: 112 MMHG

## 2017-08-28 PROCEDURE — A6210 FOAM DRG >16<=48 SQ IN W/O B: HCPCS

## 2017-08-28 PROCEDURE — 11045 DBRDMT SUBQ TISS EACH ADDL: CPT | Performed by: SURGERY

## 2017-08-28 PROCEDURE — 11042 DBRDMT SUBQ TIS 1ST 20SQCM/<: CPT

## 2017-08-28 PROCEDURE — 11045 DBRDMT SUBQ TISS EACH ADDL: CPT

## 2017-08-28 PROCEDURE — 11042 DBRDMT SUBQ TIS 1ST 20SQCM/<: CPT | Performed by: SURGERY

## 2017-08-28 NOTE — PROGRESS NOTES
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 No evidence of arterial insufficiency or specific trauma. We performed debridement on her first visit on 7/31/2017.  Home healthcare has been seeing her three times weekly using Aquacel Ag and a Spandigrip for compression.  The patient uses a motorized wheelchair.  Good nutritional intake.  Minimal discomfort associated with the ulcer or dressing changes.      Exam:  /62  Pulse 80  Temp 98.3  F (36.8  C) (Temporal)  Resp 18  LMP  (LMP Unknown)  Alert and appropriate.  Very comfortable.  +3 left dorsalis pedis pulse.  Only minimal edema.  No cellulitis.  Ulcerated area measures 11.8 x 9 cm with a maximum depth of 0.2 cm.  Healthy firm granulation tissue is noted in the anterior portion but very on the tissue laterally.  No significant undermining at any site.    Procedure:  Time out was called, informed consent obtained, and topical anesthetic of 4% lidocaine was applied per standing protocol, debridement was performed using a #15 blade down to and including subcutaneous tissue .  We did required more extensive debridement on the lateral ulcer to excise the very mushy tissue down the more solid tissue.  This is still subcutaneous and not involving the fascia.  Very adequate bleeding is noted.   The tissue anteriorly is much firmer and healthier. Bleeding controlled with light pressure. Patient tolerated procedure well.    Impression: Left calf ulcer with no arterial insufficiency.  More extensive debridement has been performed  With the final depth of the lateral measuring slightly less than a centimeter in several areas.  Due to the drainage and moisture of the granulation we will go to Iodofoam dressing instead of the AQUACEL.  Continue with the Spandigrip compression. Home health care will change this three times weekly.                       In the  past on her right calf ulcer she requires split-thickness skin grafting which worked very well.  Patient would like to avoid this if possible on the left low the size of the wound once we have healthy granulation tissue may warrant grafting.    Plan: We will dress the wounds with Iodofoam.  Patient will return to the clinic in 3 weeks time    Yandel Fair MD  08/28/17@ 12:12 PM

## 2017-09-03 ENCOUNTER — TELEPHONE (OUTPATIENT)
Dept: FAMILY MEDICINE | Facility: CLINIC | Age: 77
End: 2017-09-03

## 2017-09-03 RX ORDER — ATORVASTATIN CALCIUM 40 MG/1
TABLET, FILM COATED ORAL
Qty: 90 TABLET | Refills: 3 | Status: SHIPPED | OUTPATIENT
Start: 2017-09-03

## 2017-09-03 RX ORDER — LISINOPRIL 5 MG/1
5 TABLET ORAL DAILY
Qty: 90 TABLET | Refills: 3 | Status: SHIPPED | OUTPATIENT
Start: 2017-09-03

## 2017-09-03 NOTE — TELEPHONE ENCOUNTER
We have not been getting records from [facility] Dr. Patel or [facility] Dr. Melara on [place]  Please call for records as she has followed there for quite awhile  JUST most recent office visit/labs is needed - nothing way back is needed.  Thanks!

## 2017-09-05 NOTE — TELEPHONE ENCOUNTER
Left message for both clinics to return call to clinic nurse line to review information that Dr. Michelle is requesting.   Deja Felder MA

## 2017-09-06 NOTE — TELEPHONE ENCOUNTER
[facility]  Ph [phone]   Faxing now       [facility] ph [phone] --left voice mail to fax most recent OV and labs

## 2017-09-07 ENCOUNTER — ANTICOAGULATION THERAPY VISIT (OUTPATIENT)
Dept: FAMILY MEDICINE | Facility: CLINIC | Age: 77
End: 2017-09-07
Payer: MEDICARE

## 2017-09-07 ENCOUNTER — TELEPHONE (OUTPATIENT)
Dept: FAMILY MEDICINE | Facility: CLINIC | Age: 77
End: 2017-09-07

## 2017-09-07 DIAGNOSIS — Z79.01 LONG-TERM (CURRENT) USE OF ANTICOAGULANTS: ICD-10-CM

## 2017-09-07 DIAGNOSIS — I48.0 PAROXYSMAL ATRIAL FIBRILLATION (H): ICD-10-CM

## 2017-09-07 LAB — INR PPP: 4.5

## 2017-09-07 PROCEDURE — 99207 ZZC NO CHARGE NURSE ONLY: CPT | Performed by: INTERNAL MEDICINE

## 2017-09-07 NOTE — MR AVS SNAPSHOT
Malgorzata Lan   9/7/2017   Anticoagulation Therapy Visit    Description:  77 year old female   Provider:  Clair Michelle DO   Department:  [facility]           INR as of 9/7/2017     Today's INR 4.5!      Anticoagulation Summary as of 9/7/2017     INR goal 2.0-3.0   Today's INR 4.5!   Full instructions 9/7: Hold; 9/8: 2.5 mg; Otherwise 2.5 mg on Mon; 5 mg all other days   Next INR check 9/11/2017    Indications   Long-term (current) use of anticoagulants [Z79.01] [Z79.01]  Atrial fibrillation (H) [I48.91]         Description     INR   Who is calling?  patient   Phone number:  [phone]   Fax number:     Name of caller: Malgorzata   INR Value:  4.5      September 2017 Details    Sun Mon Tue Wed Thu Fri Sat          1               2                 3               4               5               6               7      Hold   See details      8      2.5 mg         9      5 mg           10      5 mg         11            12               13               14               15               16                 17               18               19               20               21               22               23                 24               25               26               27               28               29               30                Date Details   09/07 This INR check       Date of next INR:  9/11/2017         How to take your warfarin dose     To take:  2.5 mg Take 1 of the 2.5 mg tablets.    To take:  5 mg Take 2 of the 2.5 mg tablets.    Hold Do not take your warfarin dose. See the Details table to the right for additional instructions.

## 2017-09-07 NOTE — TELEPHONE ENCOUNTER
Reason for Call:  INR    Who is calling?  patient    Phone number:  [phone]    Fax number:      Name of caller: Malgorzata    INR Value:  4.5    Are there any other concerns:  Yes: INR was a little high, wondering if dosage needs to change?    Can we leave a detailed message on this number? YES    Phone number patient can be reached at: Home number on file [phone] (home)      Call taken on 9/7/2017 at 11:31 AM by Julita Ansari    .

## 2017-09-07 NOTE — PROGRESS NOTES
ANTICOAGULATION FOLLOW-UP CLINIC VISIT    Patient Name:  Malgorzata Lan  Date:  9/7/2017  Contact Type:  Telephone/ Patient - Home INR Patient    SUBJECTIVE:     Patient Findings     Positives Change in diet/appetite, Unexplained INR or factor level change    Comments Patient states she is on Whey Protein trying to loose weight.            OBJECTIVE    INR   Date Value Ref Range Status   09/07/2017 4.5  Final       ASSESSMENT / PLAN  INR assessment SUPRA    Recheck INR In: 3 DAYS    INR Location Home INR      Anticoagulation Summary as of 9/7/2017     INR goal 2.0-3.0   Today's INR 4.5!   Maintenance plan 2.5 mg (2.5 mg x 1) on Mon; 5 mg (2.5 mg x 2) all other days   Full instructions 9/7: Hold; 9/8: 2.5 mg; Otherwise 2.5 mg on Mon; 5 mg all other days   Weekly total 32.5 mg   Plan last modified Marifer Zaragoza RN (8/15/2017)   Next INR check 9/11/2017   Priority INR   Target end date     Indications   Long-term (current) use of anticoagulants [Z79.01] [Z79.01]  Atrial fibrillation (H) [I48.91]         Anticoagulation Episode Summary     INR check location Home Draw    Preferred lab     Send INR reminders to CS ANTICOAGULATION    Comments [facility] Home INR      Anticoagulation Care Providers     Provider Role Specialty Phone number    Derek Michelle Crow MD [facility] [phone]            See the Encounter Report to view Anticoagulation Flowsheet and Dosing Calendar (Go to Encounters tab in chart review, and find the Anticoagulation Therapy Visit)    Patient denies changes with medications, etc except taking Whey protein to help loose weight.  Patient states understanding of dosing instructions. Dosing based on [facility] Protocol and Provider directed care plan.      Bharati Saini RN

## 2017-09-15 DIAGNOSIS — G89.4 CHRONIC PAIN SYNDROME: ICD-10-CM

## 2017-09-15 DIAGNOSIS — N18.30 TYPE 2 DIABETES MELLITUS WITH STAGE 3 CHRONIC KIDNEY DISEASE (H): ICD-10-CM

## 2017-09-15 DIAGNOSIS — E11.22 TYPE 2 DIABETES MELLITUS WITH STAGE 3 CHRONIC KIDNEY DISEASE (H): ICD-10-CM

## 2017-09-15 RX ORDER — HUMAN INSULIN 100 [USP'U]/ML
INJECTION, SUSPENSION SUBCUTANEOUS
Qty: 10 ML | Refills: 5 | Status: SHIPPED | OUTPATIENT
Start: 2017-09-15 | End: 2018-08-13

## 2017-09-15 RX ORDER — TRAMADOL HYDROCHLORIDE 50 MG/1
TABLET ORAL
Qty: 240 TABLET | Refills: 0 | Status: SHIPPED | OUTPATIENT
Start: 2017-09-15 | End: 2017-11-04

## 2017-09-15 NOTE — TELEPHONE ENCOUNTER
Controlled Substance Refill Request for Tramadol  Problem List Complete:  Yes    Last Written Prescription Date:  6/2/2017  Last Fill Quantity: 240,   # refills: 1    Last Office Visit with [facility] primary care provider: 8/9/2017  Future Office visit:   Next 5 appointments (look out 90 days)     Sep 18, 2017 10:15 AM CDT   Return Visit with Yandel Fair MD   [facility] [place] ([facility]    [address]  [address]  [place] [address]   [phone]            Nov 07, 2017  2:15 PM CST   Return Visit with Ed Shine MD   [facility] [place] ([facility]    [address]  [place] [address] [phone]                Patient is followed by Clair Michelle DO for ongoing prescription of pain medication.  All refills should only be approved by this provider, or covering partner.    Medication(s): Tramadol  Maximum quantity per month: 120  Clinic visit frequency required: Q 6  months     Controlled substance agreement:  Encounter-Level CSA - 07/22/2015:          Controlled Substance Agreement - Scan on 8/4/2015  1:18 PM : CONTROLLED SUBSTANCE AGREEMENT 07/22/15 (below)              Pain Clinic evaluation in the past: No    DIRE Total Score(s):    7/23/2015   Total Score 21       Last [facility] website verification:  done on 9/15/2017   [email or web address]

## 2017-09-15 NOTE — TELEPHONE ENCOUNTER
Prescription approved per [facility] Refill Protocol.  Bharati Saini RN

## 2017-09-18 ENCOUNTER — HOSPITAL ENCOUNTER (OUTPATIENT)
Dept: WOUND CARE | Facility: CLINIC | Age: 77
Discharge: HOME | End: 2017-09-18
Attending: SURGERY | Admitting: SURGERY
Payer: MEDICARE

## 2017-09-18 VITALS
SYSTOLIC BLOOD PRESSURE: 139 MMHG | DIASTOLIC BLOOD PRESSURE: 72 MMHG | RESPIRATION RATE: 16 BRPM | HEART RATE: 68 BPM | TEMPERATURE: 97.4 F

## 2017-09-18 PROCEDURE — 11042 DBRDMT SUBQ TIS 1ST 20SQCM/<: CPT

## 2017-09-18 PROCEDURE — 11042 DBRDMT SUBQ TIS 1ST 20SQCM/<: CPT | Performed by: SURGERY

## 2017-09-18 PROCEDURE — A6210 FOAM DRG >16<=48 SQ IN W/O B: HCPCS

## 2017-09-18 NOTE — PROGRESS NOTES
[facility] [place] Progress Note    Subject: Malgorzata Lan Return to see us today for left tibial ulcer.  She's been using iodoform on this.  She also has a Spandigrip.  She denies any significant pain.   Home health care is changing this three times weekly.      Exam:  /72  Pulse 68  Temp 97.4  F (36.3  C) (Temporal)  Resp 16  LMP  (LMP Unknown)  Alert and appropriate.  Ulceration measures  8.8 x 8.2 size of neck some depth of 0.3 cm.  Secondary ulcer measures 1.5 x 0.9 x 0.2 cm.  At her previous visit this measured 12 x 9 cm in the main ulcer.    Tissue appeared to be macerated.  There is a thick fibrin Particularly on the anterior lateral border.  Edges are fairly well-defined.  Her cream is used on the intact skin edges to protect these.    Procedure:  Time out was called, informed consent obtained, and topical anesthetic of 4% lidocaine was applied per standing protocol, debridement was performed using a #15 blade down to and including subcutaneous tissue .  I removed all the fibrin.  Skin edges were debrided.  Good vascularity is noted.  However, all the tissue appears to be very boggy and fluid-filled implying poor edema control. Bleeding controlled with light pressure. Patient tolerated procedure well.    Impression: Some decrease in size.  Concerned about the edema.  This needs to be controlled to aid in healing.  This is a large ulcer that may eventually require skin grafting likely did on the right side but the wound is not ready for this.    Plan: We will dress the wounds with iodoform three times weekly with home health care.  Better pressure control going to a tighter Spandigrip size E compression..  Patient will return to the clinic in two weeks weeks time--I feel that more frequent debridements necessary along with the evaluation whether increased pressure has been helpful for the swelling.    Yandel Fair MD  09/18/17 11:47 AM

## 2017-09-19 ENCOUNTER — TELEPHONE (OUTPATIENT)
Dept: FAMILY MEDICINE | Facility: CLINIC | Age: 77
End: 2017-09-19

## 2017-09-19 ENCOUNTER — ANTICOAGULATION THERAPY VISIT (OUTPATIENT)
Dept: FAMILY MEDICINE | Facility: CLINIC | Age: 77
End: 2017-09-19
Payer: MEDICARE

## 2017-09-19 DIAGNOSIS — I48.0 PAROXYSMAL ATRIAL FIBRILLATION (H): ICD-10-CM

## 2017-09-19 DIAGNOSIS — Z79.01 LONG-TERM (CURRENT) USE OF ANTICOAGULANTS: ICD-10-CM

## 2017-09-19 LAB — INR PPP: 2.6

## 2017-09-19 PROCEDURE — 99207 ZZC NO CHARGE NURSE ONLY: CPT | Performed by: INTERNAL MEDICINE

## 2017-09-19 NOTE — TELEPHONE ENCOUNTER
Reason for Call:  INR    Who is calling?  Self/ Did it at home    INR Value:  2.6    Can we leave a detailed message on this number? YES    Phone number patient can be reached at: Home number on file [phone] (home)      Call taken on 9/19/2017 at 11:08 AM by Lisa Conti

## 2017-09-19 NOTE — PROGRESS NOTES
ANTICOAGULATION FOLLOW-UP CLINIC VISIT    Patient Name:  Malgorzata Lan  Date:  9/19/2017  Contact Type:  Telephone/ Patient - [facility]    SUBJECTIVE:     Patient Findings     Positives No Problem Findings           OBJECTIVE    INR   Date Value Ref Range Status   09/19/2017 2.6  Final       ASSESSMENT / PLAN  INR assessment THER    Recheck INR In: 1 WEEK    INR Location Home INR      Anticoagulation Summary as of 9/19/2017     INR goal 2.0-3.0   Today's INR 2.6   Maintenance plan 2.5 mg (2.5 mg x 1) on Mon; 5 mg (2.5 mg x 2) all other days   Full instructions 2.5 mg on Mon; 5 mg all other days   Weekly total 32.5 mg   No change documented Bharati Saini RN   Plan last modified Marifer Zaragoza RN (8/15/2017)   Next INR check 9/26/2017   Priority INR   Target end date     Indications   Long-term (current) use of anticoagulants [Z79.01] [Z79.01]  Atrial fibrillation (H) [I48.91]         Anticoagulation Episode Summary     INR check location Home Draw    Preferred lab     Send INR reminders to  ANTICOAGULATION    Comments [facility] Home INR      Anticoagulation Care Providers     Provider Role Specialty Phone number    Michelle Reed MD Responsible Family Practice [phone]            See the Encounter Report to view Anticoagulation Flowsheet and Dosing Calendar (Go to Encounters tab in chart review, and find the Anticoagulation Therapy Visit)    Detailed message left (per her permission) on patient's home phone with dosing instructions.  Patient is a Home INR [facility] patient.  Dosing based on [facility] Protocol and Provider directed care plan.      Bharati Saini, MORRIS

## 2017-09-19 NOTE — MR AVS SNAPSHOT
Malgorzata Lan   9/19/2017   Anticoagulation Therapy Visit    Description:  77 year old female   Provider:  Clair Michelle DO   Department:  [facility]           INR as of 9/19/2017     Today's INR 2.6      Anticoagulation Summary as of 9/19/2017     INR goal 2.0-3.0   Today's INR 2.6   Full instructions 2.5 mg on Mon; 5 mg all other days   Next INR check 9/26/2017    Indications   Long-term (current) use of anticoagulants [Z79.01] [Z79.01]  Atrial fibrillation (H) [I48.91]         Description     INR   Who is calling?  Self/ Did it at home   INR Value:  2.6 Can we leave a detailed message on this number? YES   Phone number patient can be reached at: Home number on file [phone] (home)      September 2017 Details    Sun Mon Tue Wed Thu Fri Sat          1               2                 3               4               5               6               7               8               9                 10               11               12               13               14               15               16                 17               18               19      5 mg   See details      20      5 mg         21      5 mg         22      5 mg         23      5 mg           24      5 mg         25      2.5 mg         26            27               28               29               30                Date Details   09/19 This INR check       Date of next INR:  9/26/2017         How to take your warfarin dose     To take:  2.5 mg Take 1 of the 2.5 mg tablets.    To take:  5 mg Take 2 of the 2.5 mg tablets.

## 2017-09-20 ENCOUNTER — TELEPHONE (OUTPATIENT)
Dept: FAMILY MEDICINE | Facility: CLINIC | Age: 77
End: 2017-09-20

## 2017-09-20 ENCOUNTER — ALLIED HEALTH/NURSE VISIT (OUTPATIENT)
Dept: CARDIOLOGY | Facility: CLINIC | Age: 77
End: 2017-09-20
Payer: MEDICARE

## 2017-09-20 DIAGNOSIS — S81.802D NON-HEALING WOUND OF LOWER EXTREMITY, LEFT, SUBSEQUENT ENCOUNTER: ICD-10-CM

## 2017-09-20 DIAGNOSIS — S81.802D NON-HEALING WOUND OF LOWER EXTREMITY, LEFT, SUBSEQUENT ENCOUNTER: Primary | ICD-10-CM

## 2017-09-20 DIAGNOSIS — Z95.0 CARDIAC PACEMAKER IN SITU: Primary | ICD-10-CM

## 2017-09-20 PROCEDURE — 93294 REM INTERROG EVL PM/LDLS PM: CPT | Performed by: INTERNAL MEDICINE

## 2017-09-20 PROCEDURE — 93296 REM INTERROG EVL PM/IDS: CPT | Performed by: INTERNAL MEDICINE

## 2017-09-20 RX ORDER — LIDOCAINE HCL 4% 4 G/100G
CREAM TOPICAL DAILY PRN
Qty: 63 G | Refills: 3 | Status: SHIPPED | OUTPATIENT
Start: 2017-09-20 | End: 2017-09-21

## 2017-09-20 NOTE — MR AVS SNAPSHOT
After Visit Summary   9/20/2017    Malgorzata Lan    MRN: [id number]           Patient Information     Date Of Birth          1940        Visit Information        Provider Department      9/20/2017 1:30 PM BUCIO TECH1 [facility]        Today's Diagnoses     Cardiac pacemaker in situ    -  1       Follow-ups after your visit        Your next 10 appointments already scheduled     Sep 20, 2017  1:30 PM CDT   Remote PPM Check with BUCIO TECH1   [facility] ([facility])    [address]  [place] [address]   [phone]           This appointment is for a remote check of your pacemaker.  This is not an appointment at the office.            Oct 05, 2017  1:00 PM CDT   Return Visit with Jayde Mckinney PA-C   [facility] ([facility])    [address]  [address]  [facility] [address]   [phone]            Oct 16, 2017 11:00 AM CDT   Return Visit with Yandel Fair MD   [facility] ([facility])    [address]  [address]  [facility] [address]   [phone]            Nov 07, 2017  1:00 PM CST   Pacemaker Check with BUCIO DCR2   [facility] ([facility])    [address]  [place] [address]   [phone]            Nov 07, 2017  2:15 PM CST   Return Visit with Ed Shine MD   [facility] ([facility])    [address]  [place] [address]   [phone]              If Something Goes Wrong     I will check my blood sugars twice a day     Notes - Note created  7/17/2014 10:25 AM by Jeannine Baker RN    As of today's date 7/17/2014 goal is met at 76 - 100%.   Goal Status:  Active        Who to contact     If you have questions or need follow up  information about today's clinic visit or your schedule please contact [facility] [place] directly at [phone].  Normal or non-critical lab and imaging results will be communicated to you by MyChart, letter or phone within 4 business days after the clinic has received the results. If you do not hear from us within 7 days, please contact the clinic through MyChart or phone. If you have a critical or abnormal lab result, we will notify you by phone as soon as possible.  Submit refill requests through [employer] or call your pharmacy and they will forward the refill request to us. Please allow 3 business days for your refill to be completed.          Additional Information About Your Visit        Care EveryWhere ID     This is your Care EveryWhere ID. This could be used by other organizations to access your [facility] medical records  [id number]        Your Vitals Were     Last Period                   (LMP Unknown)            Blood Pressure from Last 3 Encounters:   09/18/17 139/72   08/28/17 112/62   08/09/17 100/58    Weight from Last 3 Encounters:   08/09/17 122.5 kg (270 lb)   06/07/17 123.7 kg (272 lb 11.2 oz)   04/11/17 129.7 kg (286 lb)              We Performed the Following     INTERROGATION DEVICE EVAL REMOTE, PACER/ICD (41549)     PM DEVICE INTERROGATE REMOTE (80363)        Primary Care Provider Office Phone # Fax #    Clair Michelle [phone] [address]  [place] [address]        Goals        General    I will call my PCP right away if I have increased shortness of breath.   (pt-stated)     Notes - Note created  7/17/2014 10:27 AM by Jeannine Baker RN    As of today's date 7/17/2014 goal is met at 51 - 75%.   Goal Status:  Active      I will weigh myself daily and records results (pt-stated)     Notes - Note created  7/17/2014 10:25 AM by Jeannine Baker, RN    As of today's date 7/17/2014 goal is met at 76 - 100%.   Goal Status:   Active        Equal Access to Services     [facility]: Hadii aravind loza ada Mattaali, wachiragda luqadaha, qayuliyadarell huangseverinolaya vila, emmanuel su. So [facility] [phone].    ATENCIÓN: Si habla espann, tiene a bergeron disposición servicios gratuitos de asistencia lingüística. Zahidaame al [phone].    We comply with applicable federal civil rights laws and [place] laws. We do not discriminate on the basis of race, color, national origin, age, disability sex, sexual orientation or gender identity.            Thank you!     Thank you for choosing [facility] [place]  for your care. Our goal is always to provide you with excellent care. Hearing back from our patients is one way we can continue to improve our services. Please take a few minutes to complete the written survey that you may receive in the mail after your visit with us. Thank you!             Your Updated Medication List - Protect others around you: Learn how to safely use, store and throw away your medicines at www.disposemymeds.org.          This list is accurate as of: 9/20/17  8:49 AM.  Always use your most recent med list.                   Brand Name Dispense Instructions for use Diagnosis    acetaminophen 650 MG 8 hour tablet     100 tablet    Take 650 mg by mouth every 6 hours as needed for mild pain    S/P hysterectomy with oophorectomy       albuterol (5 MG/ML) 0.5% neb solution    PROVENTIL    60 vial    Take 0.5 mLs (2.5 mg) by nebulization every 4 hours as needed for wheezing or shortness of breath / dyspnea (dyspnea)    COPD exacerbation (H)       alendronate 70 MG tablet    FOSAMAX    12 tablet    Take 1 tablet (70 mg) by mouth every 7 days Swallow with a full glass of water an hour before breakfast and stay upright afterwards.    Osteopenia, unspecified location       alum & mag hydroxide-simethicone 400-400-40 MG/5ML Susp suspension    MYLANTA ES/MAALOX  ES     Take 15-30 mLs by mouth  every 4 hours as needed for indigestion    S/P hysterectomy with oophorectomy       ASPIRIN NOT PRESCRIBED    INTENTIONAL    0 each    Antiplatelet medication not prescribed intentionally due to Current anticoagulant therapy (warfarin/enoxaparin)    Type 2 diabetes mellitus with diabetic nephropathy, with long-term current use of insulin (H)       atorvastatin 40 MG tablet    LIPITOR    90 tablet    TAKE 1 TABLET(40 MG) BY MOUTH AT BEDTIME    Mixed hyperlipidemia       betamethasone valerate 0.1 % ointment    VALISONE    15 g    Apply topically daily as needed To inside of ears.    Dermatitis       blood glucose monitoring test strip    no brand specified    3 Month    Use to test blood sugars 3 -4  times daily or as directed        budesonide 0.5 MG/2ML neb solution    PULMICORT    60 ampule    Take 2 mLs (0.5 mg) by nebulization 2 times daily as needed    COPD exacerbation (H)       carvedilol 6.25 MG tablet    COREG    180 tablet    Take 2 tablets (12.5 mg) by mouth 2 times daily (with meals)    Chronic atrial fibrillation (H), Congestive heart failure, unspecified congestive heart failure chronicity, unspecified congestive heart failure type (H)       CINNAMON PO      Take 1,000 mg by mouth daily        cod liver oil Caps capsule      Take 1 capsule by mouth daily        digoxin 125 MCG tablet    LANOXIN    90 tablet    Take 1 tablet (125 mcg) by mouth daily    Atrial fibrillation (H)       fluticasone 50 MCG/ACT spray    FLONASE    3 Bottle    Spray 1-2 sprays into both nostrils daily    Nasal congestion       JANTOVEN 2.5 MG tablet   Generic drug:  warfarin     100 tablet    Take 1-2 tablets daily or as directed by INR clinic    Chronic atrial fibrillation (H)       latanoprost 0.005 % ophthalmic solution    XALATAN     Place 1 drop into both eyes At Bedtime.        * Lidocaine 4 % Gel     30 g    Externally apply topically every 3 days Prior to dressing changes.    Non-healing wound of lower extremity, left,  subsequent encounter       * Lidocaine 0.5 % Aero     170 g    Apply as needed prior to dressing changes    Non-healing wound of lower extremity, left, subsequent encounter       lisinopril 5 MG tablet    PRINIVIL/ZESTRIL    90 tablet    Take 1 tablet (5 mg) by mouth daily    Type 2 diabetes mellitus with diabetic nephropathy, with long-term current use of insulin (H)       meclizine 25 MG tablet    ANTIVERT    270 tablet    TAKE 1 TABLET(25 MG) BY MOUTH THREE TIMES DAILY AS NEEDED FOR DIZZINESS    Vertigo       nitroGLYcerin 0.4 MG sublingual tablet    NITROSTAT    30 tablet    If chest pain place under tongue call 911 if pain not resolved/ repeat every 5 min for total of 3 doses    Congestive heart failure, unspecified congestive heart failure chronicity, unspecified congestive heart failure type (H)       NovoLIN MIX 70/30 VIAL injection   Generic drug:  insulin NPH-insulin regular     10 mL    INJECT 30 UNITS SUBCUTANEOUSLY TWICE DAILY WITH MEALS.    Type 2 diabetes mellitus with stage 3 chronic kidney disease (H)       oxybutynin 5 MG tablet    DITROPAN    90 tablet    Take 1 tablet (5 mg) by mouth daily    Urinary incontinence, unspecified type       pentoxifylline 400 MG CR tablet    TRENtal    270 tablet    TAKE 1 TABLET(400 MG) BY MOUTH THREE TIMES DAILY WITH MEALS    Peripheral vascular disease (H)       polyethylene glycol Packet    MIRALAX/GLYCOLAX     Take 1 packet by mouth 2 times daily as needed for constipation        potassium chloride SA 20 MEQ CR tablet    K-DUR/KLOR-CON M    180 tablet    Take 1 tablet (20 mEq) by mouth 2 times daily    Diastolic dysfunction       torsemide 10 MG tablet    DEMADEX    180 tablet    Take 2 tablets (20 mg) by mouth At Bedtime    CHF (congestive heart failure) (H)       traMADol 50 MG tablet    ULTRAM    240 tablet    TAKE 1 TO 2 TABLETS BY MOUTH EVERY 6 HOURS AS NEEDED FOR PAIN    Chronic pain syndrome       Vitamin B 12 100 MCG Lozg     100 lozenge    Take 1,000  mcg by mouth daily    Vitamin B12 deficiency (non anemic)       vitamin D3 2000 UNITS Caps     90 capsule    Take 2,000 Units by mouth daily    Osteoporosis       * Notice:  This list has 2 medication(s) that are the same as other medications prescribed for you. Read the directions carefully, and ask your doctor or other care provider to review them with you.

## 2017-09-20 NOTE — PROGRESS NOTES
[employer] K062 (S) Remote PPM Device Check  : 19%  Mode: VVI        Presenting Rhythm: VS &   Heart Rate: histogram shows heart rates 50 - 160 with 25% > 100bpm  Sensing: stable    Pacing Threshold: stable    Impedance: stable  Battery Status: 6.5 years remaining  Atrial Arrhythmia: chronic Afib, taking Jantoven   Ventricular Arrhythmia: 39 vent high rates. EGMs available continue to show RVR. Reviewed findings with Jo-Ann RN     Care Plan: Annual threshold scheduled in November. Gave results over the phone to justin SIMS

## 2017-09-20 NOTE — TELEPHONE ENCOUNTER
Reason for Call:  Home Health Care    Neisha with [facility] called regarding (reason for call): orders    Orders are needed for this patient. Nursing    Skilled Nursing:  Cont nursing 3 tmes a week   Requesting 4% lidocaine gel be sent to [facility] [address] [place], [place] [address]GEORGIE DAMON AT [facility] [address]       Phone Number Homecare Nurse can be reached at: [phone]    Can we leave a detailed message on this number? YES    Phone number patient can be reached at: NA    Best Time: anytime    Call taken on 9/20/2017 at 11:47 AM by Rama Dumont

## 2017-09-20 NOTE — TELEPHONE ENCOUNTER
Returned call. OK'd requested orders.  Orders will be faxed to PCP for review and signature.   RX Lidocaine gel sent to pharmacy. HC nurse reports patient has large wounds and needs refills at this time.   Jen Lowry RN

## 2017-09-21 RX ORDER — LIDOCAINE HCL 4% 4 G/100G
CREAM TOPICAL DAILY PRN
Qty: 63 G | Refills: 3 | Status: SHIPPED | OUTPATIENT
Start: 2017-09-21

## 2017-09-21 NOTE — TELEPHONE ENCOUNTER
"Message from Walkareem Jackson: \"This medication is not available in 4% GEL. It is available in 4% CREAM. Would MD be okay to switch?\"    Lidocaine 4% Gel      Last Written Prescription Date: 9/20/17  Last Fill Quantity: 30g, # refills: 2  Last Office Visit with [facility], [facility] or [facility] prescribing provider: 8/9/17 Miguel Angel  Next 5 appointments (look out 90 days)     Oct 05, 2017  1:00 PM CDT   Return Visit with Jayde Mckinney PA-C   [facility] [place] ([facility])    [address]  [address]  [facility] [address]   [phone]            Oct 16, 2017 11:00 AM CDT   Return Visit with Yandel Fair MD   [facility] [place] ([facility])    [address]  [address]  [facility] [address]   [phone]            Nov 07, 2017  2:15 PM CST   Return Visit with Ed Shine MD   [facility] [place] ([facility])    [address]  [facility] [address] [phone]                   Creatinine   Date Value Ref Range Status   04/11/2017 1.02 0.52 - 1.04 mg/dL Final     Lena Garibay, RT(R)  "

## 2017-09-26 ENCOUNTER — ANTICOAGULATION THERAPY VISIT (OUTPATIENT)
Dept: NURSING | Facility: CLINIC | Age: 77
End: 2017-09-26
Payer: MEDICARE

## 2017-09-26 ENCOUNTER — TELEPHONE (OUTPATIENT)
Dept: FAMILY MEDICINE | Facility: CLINIC | Age: 77
End: 2017-09-26

## 2017-09-26 DIAGNOSIS — I48.0 PAROXYSMAL ATRIAL FIBRILLATION (H): ICD-10-CM

## 2017-09-26 DIAGNOSIS — Z79.01 LONG-TERM (CURRENT) USE OF ANTICOAGULANTS: ICD-10-CM

## 2017-09-26 LAB — INR PPP: 3.1

## 2017-09-26 PROCEDURE — 99207 ZZC NO CHARGE NURSE ONLY: CPT | Performed by: INTERNAL MEDICINE

## 2017-09-26 NOTE — PROGRESS NOTES
ANTICOAGULATION FOLLOW-UP CLINIC VISIT    Patient Name:  Malgorzata Lan  Date:  9/26/2017  Contact Type:  Telephone/ Spoke with patient on the phone and provided dosing instruction and recheck date. Patient agrees with plan.     SUBJECTIVE:     Patient Findings     Positives No Problem Findings           OBJECTIVE    INR   Date Value Ref Range Status   09/26/2017 3.1  Final       ASSESSMENT / PLAN  INR assessment THER    Recheck INR In: 1 WEEK    INR Location Clinic      Anticoagulation Summary as of 9/26/2017     INR goal 2.0-3.0   Today's INR 3.1!   Maintenance plan 2.5 mg (2.5 mg x 1) on Mon; 5 mg (2.5 mg x 2) all other days   Full instructions 2.5 mg on Mon; 5 mg all other days   Weekly total 32.5 mg   No change documented Britt Jacobo RN   Plan last modified Marifer Zaragoza RN (8/15/2017)   Next INR check 10/3/2017   Priority INR   Target end date     Indications   Long-term (current) use of anticoagulants [Z79.01] [Z79.01]  Atrial fibrillation (H) [I48.91]         Anticoagulation Episode Summary     INR check location Home Draw    Preferred lab     Send INR reminders to  ANTICOAGULATION    Comments [facility] INR      Anticoagulation Care Providers     Provider Role Specialty Phone number    Derek Michelle Crow MD [facility] [phone]            See the Encounter Report to view Anticoagulation Flowsheet and Dosing Calendar (Go to Encounters tab in chart review, and find the Anticoagulation Therapy Visit)    Dosage adjustment made based on physician directed care plan.  No changes.     Britt Jacobo RN

## 2017-09-26 NOTE — TELEPHONE ENCOUNTER
Reason for Call:  INR    Who is calling?  Self    Phone number:  [phone]    Fax number:      Name of caller:Malgorzata    INR Value:  3.1    Are there any other concerns:  No    Can we leave a detailed message on this number? YES    Phone number patient can be reached at: Home number on file [phone] (home)      Call taken on 9/26/2017 at 10:33 AM by Starla Akers

## 2017-09-26 NOTE — TELEPHONE ENCOUNTER
I called patient and provided dosing instruction and recheck date. See anticoagulation encounter from 9/26/17 for dosing.     Britt Jacobo RN

## 2017-09-26 NOTE — MR AVS SNAPSHOT
Malgorzata Lan   9/26/2017   Anticoagulation Therapy Visit    Description:  77 year old female   Provider:  Clair Michelle DO   Department:  [facility] Nurse           INR as of 9/26/2017     Today's INR 3.1!      Anticoagulation Summary as of 9/26/2017     INR goal 2.0-3.0   Today's INR 3.1!   Full instructions 2.5 mg on Mon; 5 mg all other days   Next INR check 10/3/2017    Indications   Long-term (current) use of anticoagulants [Z79.01] [Z79.01]  Atrial fibrillation (H) [I48.91]         Contact Numbers     Clinic Number:         September 2017 Details    Sun Mon Tue Wed Thu Fri Sat          1               2                 3               4               5               6               7               8               9                 10               11               12               13               14               15               16                 17               18               19               20               21               22               23                 24               25               26      5 mg   See details      27      5 mg         28      5 mg         29      5 mg         30      5 mg          Date Details   09/26 This INR check               How to take your warfarin dose     To take:  5 mg Take 2 of the 2.5 mg tablets.           October 2017 Details    Sun Mon Tue Wed Thu Fri Sat     1      5 mg         2      2.5 mg         3            4               5               6               7                 8               9               10               11               12               13               14                 15               16               17               18               19               20               21                 22               23               24               25               26               27               28                 29               30               31                    Date Details   No additional details    Date of next INR:  10/3/2017          How to take your warfarin dose     To take:  2.5 mg Take 1 of the 2.5 mg tablets.    To take:  5 mg Take 2 of the 2.5 mg tablets.

## 2017-10-02 ENCOUNTER — TELEPHONE (OUTPATIENT)
Dept: FAMILY MEDICINE | Facility: CLINIC | Age: 77
End: 2017-10-02

## 2017-10-02 ENCOUNTER — ANTICOAGULATION THERAPY VISIT (OUTPATIENT)
Dept: FAMILY MEDICINE | Facility: CLINIC | Age: 77
End: 2017-10-02
Payer: MEDICARE

## 2017-10-02 DIAGNOSIS — I48.0 PAROXYSMAL ATRIAL FIBRILLATION (H): ICD-10-CM

## 2017-10-02 DIAGNOSIS — Z79.01 LONG-TERM (CURRENT) USE OF ANTICOAGULANTS: ICD-10-CM

## 2017-10-02 LAB — INR PPP: 3

## 2017-10-02 PROCEDURE — 99207 ZZC NO CHARGE NURSE ONLY: CPT | Performed by: INTERNAL MEDICINE

## 2017-10-02 NOTE — PROGRESS NOTES
ANTICOAGULATION FOLLOW-UP CLINIC VISIT    Patient Name:  Malgorzata Lan  Date:  10/2/2017  Contact Type:  Telephone/ Pt called clinic with [facility] INR today: 3.0. Returned call to pt with Warfarin dosing and next INR date    SUBJECTIVE:        OBJECTIVE    INR   Date Value Ref Range Status   10/02/2017 3.0  Corrected       ASSESSMENT / PLAN  INR assessment THER    Recheck INR In: 2 WEEKS    INR Location Home INR      Anticoagulation Summary as of 10/2/2017     INR goal 2.0-3.0   Today's INR 3.0   Maintenance plan 2.5 mg (2.5 mg x 1) on Mon; 5 mg (2.5 mg x 2) all other days   Full instructions 2.5 mg on Mon; 5 mg all other days   Weekly total 32.5 mg   No change documented Jen Lowry RN   Plan last modified Marifer Zaragoza RN (8/15/2017)   Next INR check 10/16/2017   Priority INR   Target end date     Indications   Long-term (current) use of anticoagulants [Z79.01] [Z79.01]  Atrial fibrillation (H) [I48.91]         Anticoagulation Episode Summary     INR check location Home Draw    Preferred lab     Send INR reminders to CS ANTICOAGULATION    Comments [facility] INR      Anticoagulation Care Providers     Provider Role Specialty Phone number    Derek Michelle Crow MD [facility] [phone]            See the Encounter Report to view Anticoagulation Flowsheet and Dosing Calendar (Go to Encounters tab in chart review, and find the Anticoagulation Therapy Visit)    Dosage adjustment made based on physician directed care plan.    Jen Lowry RN

## 2017-10-02 NOTE — TELEPHONE ENCOUNTER
Reason for Call:  INR    Who is calling?  PT    Phone number:  Pt's home    Name of caller: Malgorzata    INR Value:  3.0- today 10/2    Are there any other concerns:  No    Can we leave a detailed message on this number? YES    Phone number patient can be reached at: Home number on file [phone] (home)      Call taken on 10/2/2017 at 3:47 PM by Lisa Conti

## 2017-10-02 NOTE — MR AVS SNAPSHOT
Malgorzata Lan   10/2/2017   Anticoagulation Therapy Visit    Description:  77 year old female   Provider:  Clair Michelle DO   Department:  [facility] Family Prac/[facility]           INR as of 10/2/2017     Today's INR 3.0      Anticoagulation Summary as of 10/2/2017     INR goal 2.0-3.0   Today's INR 3.0   Full instructions 2.5 mg on Mon; 5 mg all other days   Next INR check 10/16/2017    Indications   Long-term (current) use of anticoagulants [Z79.01] [Z79.01]  Atrial fibrillation (H) [I48.91]         October 2017 Details    Sun Mon Tue Wed Thu Fri Sat     1               2      2.5 mg   See details      3      5 mg         4      5 mg         5      5 mg         6      5 mg         7      5 mg           8      5 mg         9      2.5 mg         10      5 mg         11      5 mg         12      5 mg         13      5 mg         14      5 mg           15      5 mg         16            17               18               19               20               21                 22               23               24               25               26               27               28                 29               30               31                    Date Details   10/02 This INR check       Date of next INR:  10/16/2017         How to take your warfarin dose     To take:  2.5 mg Take 1 of the 2.5 mg tablets.    To take:  5 mg Take 2 of the 2.5 mg tablets.

## 2017-10-05 ENCOUNTER — HOSPITAL ENCOUNTER (OUTPATIENT)
Dept: WOUND CARE | Facility: CLINIC | Age: 77
Discharge: HOME | End: 2017-10-05
Attending: PHYSICIAN ASSISTANT | Admitting: PHYSICIAN ASSISTANT
Payer: MEDICARE

## 2017-10-05 VITALS
DIASTOLIC BLOOD PRESSURE: 78 MMHG | RESPIRATION RATE: 20 BRPM | TEMPERATURE: 97.9 F | SYSTOLIC BLOOD PRESSURE: 152 MMHG | HEART RATE: 75 BPM

## 2017-10-05 PROCEDURE — 11045 DBRDMT SUBQ TISS EACH ADDL: CPT

## 2017-10-05 PROCEDURE — A6210 FOAM DRG >16<=48 SQ IN W/O B: HCPCS

## 2017-10-05 PROCEDURE — 11042 DBRDMT SUBQ TIS 1ST 20SQCM/<: CPT

## 2017-10-05 PROCEDURE — 11045 DBRDMT SUBQ TISS EACH ADDL: CPT | Performed by: PHYSICIAN ASSISTANT

## 2017-10-05 PROCEDURE — 11042 DBRDMT SUBQ TIS 1ST 20SQCM/<: CPT | Performed by: PHYSICIAN ASSISTANT

## 2017-10-05 NOTE — PROGRESS NOTES
[facility]    HISTORY OF PRESENT ILLNESS: Ms. Malgorzata Lan is a 77-year-old female who returns to the [facility] today to follow-up on a left lower leg venous ulceration. She has been followed by Dr. Fair who instructed Ms. Lan to wear a tighter compression garment last visit and recommended more frequent debridements which is why she presents to me today. Her wound has significantly decreased in size.     WOUND CARE: Iodofoam covered with an ABD and SpandaGrip E changed 3x a week.     VITALS: /78  Pulse 75  Temp 97.9  F (36.6  C) (Temporal)  Resp 20  LMP  (LMP Unknown)    PHYSICAL EXAM:  GENERAL: Patient is alert and oriented and in no acute distress  INTEGUMENTARY:   WOUND ASSESSMENT:     Location: left anterior lower leg     Stage/Thickness: Full    Size: 7.0 cm x 7.6 cm with a depth of 0.3 cm    Drainage: copious amount of serosanguinous drainage    Wound description: primarily granular with focal areas of stringy slough  WOUND ASSESSMENT #2:     Location: left proximal anterior lower leg     Stage/Thickness: Full    Size: 0.6 cm x 0.3 cm with a depth of 0 cm    Drainage: scant amount of serosanguinous drainage    Wound description: dried exudate once removed revealed healthy granulation tissue    PROCEDURE: Per standing orders, topical 4% lidocaine was applied to the wound by the [facility]. Timeout was called and informed consent was obtained. Using a 10 blade a surgical debridement was performed down to and including subcutaneous tissue of more than 20 but less than 40 cm. Hemostasis was achieved with pressure. The patient tolerated the procedure well.     ASSESSMENT: improving venous ulcerations of left lower leg with fat layer exposed    PLAN:   1. Primary dressing: Iodofoam; Secondary dressing: ABD pad, SpandaGrip E    FOLLOW-UP: 2 weeks with Dr. Manjula REDD PA-C

## 2017-10-06 ENCOUNTER — DOCUMENTATION ONLY (OUTPATIENT)
Dept: CARE COORDINATION | Facility: CLINIC | Age: 77
End: 2017-10-06

## 2017-10-06 NOTE — PROGRESS NOTES
Dear Dr. Clair Michelle   Medicare Home Health regulations requires [facility] to notify the Physician when the plan for visits has been altered.  We have provided fewer visits than ordered.  We are notifying you of a Missed Visit.  Malgorzata Lan; MRN [id number]  Missed Visit  Is A   Dates of missed services  10/6/2017  Reason: Patient cancelled   Sincerely [facility]  Susie Cordova  [phone]

## 2017-10-09 LAB — INR PPP: 2.8

## 2017-10-10 ENCOUNTER — ANTICOAGULATION THERAPY VISIT (OUTPATIENT)
Dept: NURSING | Facility: CLINIC | Age: 77
End: 2017-10-10
Payer: MEDICARE

## 2017-10-10 DIAGNOSIS — I48.0 PAROXYSMAL ATRIAL FIBRILLATION (H): ICD-10-CM

## 2017-10-10 DIAGNOSIS — Z79.01 LONG-TERM (CURRENT) USE OF ANTICOAGULANTS: ICD-10-CM

## 2017-10-10 PROCEDURE — 99207 ZZC NO CHARGE NURSE ONLY: CPT | Performed by: INTERNAL MEDICINE

## 2017-10-10 NOTE — PROGRESS NOTES
ANTICOAGULATION FOLLOW-UP CLINIC VISIT    Patient Name:  Malgorzata Lan  Date:  10/10/2017  Contact Type:  Telephone/ Spoke with patient on the phone and provided dosing instruction and recheck date.     SUBJECTIVE:     Patient Findings     Positives No Problem Findings           OBJECTIVE    INR   Date Value Ref Range Status   10/09/2017 2.8  Final       ASSESSMENT / PLAN  INR assessment THER    Recheck INR In: 1 WEEK    INR Location Home INR      Anticoagulation Summary as of 10/10/2017     INR goal 2.0-3.0   Today's INR 2.8 (10/9/2017)   Maintenance plan 2.5 mg (2.5 mg x 1) on Mon; 5 mg (2.5 mg x 2) all other days   Full instructions 2.5 mg on Mon; 5 mg all other days   Weekly total 32.5 mg   No change documented Britt Jacobo RN   Plan last modified Marifer Zaragoza RN (8/15/2017)   Next INR check 10/16/2017   Priority INR   Target end date     Indications   Long-term (current) use of anticoagulants [Z79.01] [Z79.01]  Atrial fibrillation (H) [I48.91]         Anticoagulation Episode Summary     INR check location Home Draw    Preferred lab     Send INR reminders to CS ANTICOAGULATION    Comments [facility]ERE Home INR      Anticoagulation Care Providers     Provider Role Specialty Phone number    Derek Michelle Crow MD [facility] [phone]            See the Encounter Report to view Anticoagulation Flowsheet and Dosing Calendar (Go to Encounters tab in chart review, and find the Anticoagulation Therapy Visit)    Dosage adjustment made based on physician directed care plan.  No changes to dose.     Britt Jacobo RN

## 2017-10-16 ENCOUNTER — HOSPITAL ENCOUNTER (OUTPATIENT)
Dept: WOUND CARE | Facility: CLINIC | Age: 77
Discharge: HOME | End: 2017-10-16
Attending: SURGERY | Admitting: SURGERY
Payer: MEDICARE

## 2017-10-16 VITALS — HEART RATE: 75 BPM | SYSTOLIC BLOOD PRESSURE: 116 MMHG | DIASTOLIC BLOOD PRESSURE: 71 MMHG | TEMPERATURE: 97.1 F

## 2017-10-16 PROCEDURE — 11042 DBRDMT SUBQ TIS 1ST 20SQCM/<: CPT

## 2017-10-16 PROCEDURE — 11045 DBRDMT SUBQ TISS EACH ADDL: CPT

## 2017-10-16 PROCEDURE — 11045 DBRDMT SUBQ TISS EACH ADDL: CPT | Performed by: SURGERY

## 2017-10-16 PROCEDURE — A6210 FOAM DRG >16<=48 SQ IN W/O B: HCPCS

## 2017-10-16 PROCEDURE — 11042 DBRDMT SUBQ TIS 1ST 20SQCM/<: CPT | Performed by: SURGERY

## 2017-10-16 NOTE — PROGRESS NOTES
[facility] [place] Progress Note    Subject: Malgorzata Lan Returns to see us today for follow-up of her left traumatic calf ulceration.  She had a previous ulcer on her right calf treated successfully with a split-thickness skin graft.  She would like to avoid this on the left possible.    Patient still lives at home. She is morbidly obese but on the whey protein diet which has been very successful and she has lost 50 pounds using this diet.  This nutritional supplement may also be helping her wound healing.    We've increased the external compression with her SpandaGrip at her last visit.  Home health care is seeing her three times weekly changing dressings using iodoform and compression.  There has been a decrease in size.  She has no significant pain.      Exam:  /71  Pulse 75  Temp 97.1  F (36.2  C) (Tympanic)  LMP  (LMP Unknown)  Alert and appropriate.  The ulceration measures 7.9 x 7.2 cm the depth of 0.1 cm.  This a thicker layer of fibrin  But improved healthier granulation tissue.  The proximal ulcer has healed.  The previous ulcer measured 12 x 9 cm when I saw her four weeks ago.    Procedure:  Time out was called, informed consent obtained, and topical anesthetic of 4% lidocaine was applied per standing protocol, debridement was performed using a #15 blade down to and including subcutaneous tissue .  I excised the thicker layer of fibrin and debrided the edges and bases of the wound. Good bleeding was noted.  This is still less than 0.2 cm in depth. Bleeding controlled with light pressure. Patient tolerated procedure well.    Impression: Decreased size of ulcer with healthier tissue noted today.  Iodoform and compression changed three times weekly with home health care.  Continue with nutritional supplements.  Follow-up exam in two weeks.    Plan: We will dress the wounds with Iodoform.  Patient will return to the clinic in 2 weeks time    Yandel Fair MD  10/16/17  10:59 AM

## 2017-10-17 ENCOUNTER — ANTICOAGULATION THERAPY VISIT (OUTPATIENT)
Dept: FAMILY MEDICINE | Facility: CLINIC | Age: 77
End: 2017-10-17
Payer: MEDICARE

## 2017-10-17 ENCOUNTER — TELEPHONE (OUTPATIENT)
Dept: FAMILY MEDICINE | Facility: CLINIC | Age: 77
End: 2017-10-17

## 2017-10-17 DIAGNOSIS — Z79.01 LONG-TERM (CURRENT) USE OF ANTICOAGULANTS: ICD-10-CM

## 2017-10-17 DIAGNOSIS — I48.91 ATRIAL FIBRILLATION (H): ICD-10-CM

## 2017-10-17 LAB — INR PPP: 1.7

## 2017-10-17 PROCEDURE — 99207 ZZC NO CHARGE NURSE ONLY: CPT | Performed by: INTERNAL MEDICINE

## 2017-10-17 NOTE — PROGRESS NOTES
"  ANTICOAGULATION FOLLOW-UP CLINIC VISIT    Patient Name:  Malgorzata Lan  Date:  10/17/2017  Contact Type:  Telephone    SUBJECTIVE:     Patient Findings     Positives Missed doses           OBJECTIVE    INR   Date Value Ref Range Status   10/17/2017 1.7  Final       ASSESSMENT / PLAN  INR assessment SUB    Recheck INR In: 1 WEEK    INR Location Home INR      Anticoagulation Summary as of 10/17/2017     INR goal 2.0-3.0   Today's INR 1.7!   Maintenance plan 2.5 mg (2.5 mg x 1) on Mon; 5 mg (2.5 mg x 2) all other days   Full instructions 10/17: 7.5 mg; Otherwise 2.5 mg on Mon; 5 mg all other days   Weekly total 32.5 mg   Plan last modified Marifer Zaragoza RN (8/15/2017)   Next INR check 10/24/2017   Priority INR   Target end date     Indications   Long-term (current) use of anticoagulants [Z79.01] [Z79.01]  Atrial fibrillation (H) [I48.91]         Anticoagulation Episode Summary     INR check location Home Draw    Preferred lab     Send INR reminders to  ANTICOAGULATION    Comments [facility] Home INR      Anticoagulation Care Providers     Provider Role Specialty Phone number    Michelle Reed MD [facility] [phone]            See the Encounter Report to view Anticoagulation Flowsheet and Dosing Calendar (Go to Encounters tab in chart review, and find the Anticoagulation Therapy Visit)    Dosage adjustment made based on physician directed care plan. Patient reports INR 1.7 and \"Knows\" she missed a dose late last week.    Advised 7.5 mg today, 5 mg ROW and recheck 1 week.  She typically runs at higher end of range. Cut back a bit on greens until recheck.    Marifer Zaragoza RN               "

## 2017-10-17 NOTE — TELEPHONE ENCOUNTER
Reason for Call:  INR    Who is calling?  Malgorzata Lan     Phone number:  [phone] (H)    Fax number:     Name of caller: Malgorzata Lan    INR Value:  1.7    Are there any other concerns:  No    Can we leave a detailed message on this number? YES    Phone number patient can be reached at: Other phone number:  [phone] (E)      Call taken on 10/17/2017 at 1:53 PM by Divya Gr

## 2017-10-17 NOTE — MR AVS SNAPSHOT
Malgorzata Lan   10/17/2017   Anticoagulation Therapy Visit    Description:  77 year old female   Provider:  Clair Michelle DO   Department:  [facility]           INR as of 10/17/2017     Today's INR 1.7!      Anticoagulation Summary as of 10/17/2017     INR goal 2.0-3.0   Today's INR 1.7!   Full instructions 10/17: 7.5 mg; Otherwise 2.5 mg on Mon; 5 mg all other days   Next INR check 10/24/2017    Indications   Long-term (current) use of anticoagulants [Z79.01] [Z79.01]  Atrial fibrillation (H) [I48.91]         October 2017 Details    Sun Mon Tue Wed Thu Fri Sat     1               2               3               4               5               6               7                 8               9               10               11               12               13               14                 15               16               17      7.5 mg   See details      18      5 mg         19      5 mg         20      5 mg         21      5 mg           22      5 mg         23      2.5 mg         24            25               26               27               28                 29               30               31                    Date Details   10/17 This INR check       Date of next INR:  10/24/2017         How to take your warfarin dose     To take:  2.5 mg Take 1 of the 2.5 mg tablets.    To take:  5 mg Take 2 of the 2.5 mg tablets.    To take:  7.5 mg Take 3 of the 2.5 mg tablets.

## 2017-10-24 ENCOUNTER — TELEPHONE (OUTPATIENT)
Dept: FAMILY MEDICINE | Facility: CLINIC | Age: 77
End: 2017-10-24

## 2017-10-24 ENCOUNTER — ANTICOAGULATION THERAPY VISIT (OUTPATIENT)
Dept: FAMILY MEDICINE | Facility: CLINIC | Age: 77
End: 2017-10-24
Payer: MEDICARE

## 2017-10-24 DIAGNOSIS — I48.91 ATRIAL FIBRILLATION (H): ICD-10-CM

## 2017-10-24 DIAGNOSIS — Z79.01 LONG-TERM (CURRENT) USE OF ANTICOAGULANTS: ICD-10-CM

## 2017-10-24 LAB — INR PPP: 1.8

## 2017-10-24 PROCEDURE — 99207 ZZC NO CHARGE NURSE ONLY: CPT | Performed by: INTERNAL MEDICINE

## 2017-10-24 NOTE — MR AVS SNAPSHOT
Malgorzata Barrazalee ann   10/24/2017   Anticoagulation Therapy Visit    Description:  77 year old female   Provider:  Clair Michelle DO   Department:  [facility]           INR as of 10/24/2017     Today's INR 1.8!      Anticoagulation Summary as of 10/24/2017     INR goal 2.0-3.0   Today's INR 1.8!   Full instructions 5 mg every day   Next INR check 10/31/2017    Indications   Long-term (current) use of anticoagulants [Z79.01] [Z79.01]  Atrial fibrillation (H) [I48.91]         October 2017 Details    Sun Mon Tue Wed Thu Fri Sat     1               2               3               4               5               6               7                 8               9               10               11               12               13               14                 15               16               17               18               19               20               21                 22               23               24      5 mg   See details      25      5 mg         26      5 mg         27      5 mg         28      5 mg           29      5 mg         30      5 mg         31                 Date Details   10/24 This INR check       Date of next INR:  10/31/2017         How to take your warfarin dose     To take:  5 mg Take 2 of the 2.5 mg tablets.

## 2017-10-24 NOTE — TELEPHONE ENCOUNTER
Reason for Call:  INR    Who is calling?  Pt calling     Phone number:  [phone]    Name of caller: Malgorzata    INR Value:  1.8    Are there any other concerns:  No    Can we leave a detailed message on this number? YES      Call taken on 10/24/2017 at 12:06 PM by Vanessa Núñez

## 2017-10-24 NOTE — PROGRESS NOTES
ANTICOAGULATION FOLLOW-UP CLINIC VISIT    Patient Name:  Malgorzata Lan  Date:  10/24/2017  Contact Type:  Telephone    SUBJECTIVE:     Patient Findings     Positives No Problem Findings           OBJECTIVE    INR   Date Value Ref Range Status   10/24/2017 1.8  Final       ASSESSMENT / PLAN  INR assessment SUB    Recheck INR In: 1 WEEK    INR Location Home INR      Anticoagulation Summary as of 10/24/2017     INR goal 2.0-3.0   Today's INR 1.8!   Maintenance plan 5 mg (2.5 mg x 2) every day   Full instructions 5 mg every day   Weekly total 35 mg   Plan last modified Marifer Zaragoza RN (10/24/2017)   Next INR check 10/31/2017   Priority INR   Target end date     Indications   Long-term (current) use of anticoagulants [Z79.01] [Z79.01]  Atrial fibrillation (H) [I48.91]         Anticoagulation Episode Summary     INR check location Home Draw    Preferred lab     Send INR reminders to [facility] ANTICOAGULATION    Comments [facility] Home INR      Anticoagulation Care Providers     Provider Role Specialty Phone number    Derek Michelle Crow MD [facility] [phone]            See the Encounter Report to view Anticoagulation Flowsheet and Dosing Calendar (Go to Encounters tab in chart review, and find the Anticoagulation Therapy Visit)    Dosage adjustment made based on physician directed care plan. INR 1.8.  Advised 7.5 mg today, then begin 5 mg daily.  Recheck 8 days..    Marifer Zaragoza RN

## 2017-10-30 ENCOUNTER — HOSPITAL ENCOUNTER (OUTPATIENT)
Dept: WOUND CARE | Facility: CLINIC | Age: 77
Discharge: HOME | End: 2017-10-30
Attending: PHYSICIAN ASSISTANT | Admitting: PHYSICIAN ASSISTANT
Payer: MEDICARE

## 2017-10-30 ENCOUNTER — OFFICE VISIT (OUTPATIENT)
Dept: FAMILY MEDICINE | Facility: CLINIC | Age: 77
End: 2017-10-30
Payer: MEDICARE

## 2017-10-30 VITALS
HEIGHT: 65 IN | OXYGEN SATURATION: 97 % | DIASTOLIC BLOOD PRESSURE: 76 MMHG | HEART RATE: 70 BPM | BODY MASS INDEX: 43.65 KG/M2 | WEIGHT: 262 LBS | SYSTOLIC BLOOD PRESSURE: 110 MMHG | TEMPERATURE: 97.6 F

## 2017-10-30 VITALS — SYSTOLIC BLOOD PRESSURE: 120 MMHG | HEART RATE: 93 BPM | TEMPERATURE: 98.5 F | DIASTOLIC BLOOD PRESSURE: 74 MMHG

## 2017-10-30 DIAGNOSIS — B35.4 TINEA CORPORIS: Primary | ICD-10-CM

## 2017-10-30 DIAGNOSIS — Z23 NEED FOR PROPHYLACTIC VACCINATION AND INOCULATION AGAINST INFLUENZA: ICD-10-CM

## 2017-10-30 PROCEDURE — 11042 DBRDMT SUBQ TIS 1ST 20SQCM/<: CPT | Performed by: PHYSICIAN ASSISTANT

## 2017-10-30 PROCEDURE — 99213 OFFICE O/P EST LOW 20 MIN: CPT | Mod: 25 | Performed by: INTERNAL MEDICINE

## 2017-10-30 PROCEDURE — 11042 DBRDMT SUBQ TIS 1ST 20SQCM/<: CPT

## 2017-10-30 PROCEDURE — 90662 IIV NO PRSV INCREASED AG IM: CPT | Performed by: INTERNAL MEDICINE

## 2017-10-30 PROCEDURE — 11045 DBRDMT SUBQ TISS EACH ADDL: CPT | Performed by: PHYSICIAN ASSISTANT

## 2017-10-30 PROCEDURE — 11045 DBRDMT SUBQ TISS EACH ADDL: CPT

## 2017-10-30 PROCEDURE — G0008 ADMIN INFLUENZA VIRUS VAC: HCPCS | Performed by: INTERNAL MEDICINE

## 2017-10-30 PROCEDURE — A6210 FOAM DRG >16<=48 SQ IN W/O B: HCPCS

## 2017-10-30 RX ORDER — NYSTATIN 100000 [USP'U]/G
POWDER TOPICAL 3 TIMES DAILY PRN
Qty: 60 G | Refills: 11 | Status: SHIPPED | OUTPATIENT
Start: 2017-10-30

## 2017-10-30 RX ORDER — CLOTRIMAZOLE 1 %
CREAM (GRAM) TOPICAL 2 TIMES DAILY
Qty: 15 G | Refills: 11 | Status: SHIPPED | OUTPATIENT
Start: 2017-10-30

## 2017-10-30 NOTE — MR AVS SNAPSHOT
After Visit Summary   10/30/2017    Malgorzata Lan    MRN: [id number]           Patient Information     Date Of Birth          1940        Visit Information        Provider Department      10/30/2017 11:00 AM Alisson Richter MD [facility]        Today's Diagnoses     Tinea corporis    -  1    Need for prophylactic vaccination and inoculation against influenza           Follow-ups after your visit        Your next 10 appointments already scheduled     Oct 30, 2017  1:00 PM CDT   Return Visit with Jayde Mckinney PA-C   [facility] [place] [facility] ([facility])    [address]  [address]  [place] [address]   [phone]            Nov 07, 2017  1:00 PM CST   Pacemaker Check with BUCIO DCR2   [facility] ([facility])    [address]  [place] [address]   [phone]            Nov 07, 2017  2:15 PM CST   Return Visit with Ed Shine MD   [facility] ([facility])    [address]  [place] [address]   [phone]              If Something Goes Wrong     I will check my blood sugars twice a day     Notes - Note created  7/17/2014 10:25 AM by Jeannine Baker RN    As of today's date 7/17/2014 goal is met at 76 - 100%.   Goal Status:  Active        Who to contact     If you have questions or need follow up information about today's clinic visit or your schedule please contact [facility] directly at [phone].  Normal or non-critical lab and imaging results will be communicated to you by MyChart, letter or phone within 4 business days after the clinic has received the results. If you do not hear from us within 7 days, please contact the clinic through MyChart or phone. If you have a critical or abnormal lab result, we will notify you by phone as soon as possible.  Submit refill requests  "through [employer] or call your pharmacy and they will forward the refill request to us. Please allow 3 business days for your refill to be completed.          Additional Information About Your Visit        Care EveryWhere ID     This is your Care EveryWhere ID. This could be used by other organizations to access your [facility] medical records  [id number]        Your Vitals Were     Pulse Temperature Height Last Period Pulse Oximetry Breastfeeding?    70 97.6  F (36.4  C) (Oral) 5' 5\" (1.651 m) (LMP Unknown) 97% No    BMI (Body Mass Index)                   43.6 kg/m2            Blood Pressure from Last 3 Encounters:   10/30/17 110/76   10/16/17 116/71   10/05/17 152/78    Weight from Last 3 Encounters:   10/30/17 262 lb (118.8 kg)   08/09/17 270 lb (122.5 kg)   06/07/17 272 lb 11.2 oz (123.7 kg)              We Performed the Following     ADMIN INFLUENZA (For MEDICARE Patients ONLY) []     FLU VACCINE, INCREASED ANTIGEN, PRESV FREE, AGE 65+ [08954]          Today's Medication Changes          These changes are accurate as of: 10/30/17 11:08 AM.  If you have any questions, ask your nurse or doctor.               Start taking these medicines.        Dose/Directions    clotrimazole 1 % cream   Commonly known as:  LOTRIMIN   Used for:  Tinea corporis   Started by:  Alisson Richter MD        Apply topically 2 times daily   Quantity:  15 g   Refills:  11       nystatin 776863 UNIT/GM Powd   Commonly known as:  MYCOSTATIN   Used for:  Tinea corporis   Started by:  Alisson Richter MD        Apply topically 3 times daily as needed   Quantity:  60 g   Refills:  11            Where to get your medicines      These medications were sent to [facility] [address] [facility]  [address], [facility] [address]     Phone:  [phone]     clotrimazole 1 % cream    nystatin [id number] UNIT/GM Powd                Primary Care Provider Office Phone # Fax # "    Clair Michelle, [phone]       [address] [facility] [address]  [place] [place] [facility] [address]        Goals        General    I will call my PCP right away if I have increased shortness of breath.   (pt-stated)     Notes - Note created  7/17/2014 10:27 AM by Jeannine Baker, RN    As of today's date 7/17/2014 goal is met at 51 - 75%.   Goal Status:  Active      I will weigh myself daily and records results (pt-stated)     Notes - Note created  7/17/2014 10:25 AM by Jeannine Baker, RN    As of today's date 7/17/2014 goal is met at 76 - 100%.   Goal Status:  Active        Equal Access to Services     HUMBERTO JIMENEZ : Hadii aravind loza hadasho Sonickyali, waaxda luqadaha, qaybta kaalmada adeegyada, emmanuel flores . So [facility] [phone].    ATENCIÓN: Si habla español, tiene a bergeron disposición servicios gratuitos de asistencia lingüística. Llame al [phone].    We comply with applicable federal civil rights laws and [place] laws. We do not discriminate on the basis of race, color, national origin, age, disability, sex, sexual orientation, or gender identity.            Thank you!     Thank you for choosing [facility]  for your care. Our goal is always to provide you with excellent care. Hearing back from our patients is one way we can continue to improve our services. Please take a few minutes to complete the written survey that you may receive in the mail after your visit with us. Thank you!             Your Updated Medication List - Protect others around you: Learn how to safely use, store and throw away your medicines at www.disposemymeds.org.          This list is accurate as of: 10/30/17 11:08 AM.  Always use your most recent med list.                   Brand Name Dispense Instructions for use Diagnosis    acetaminophen 650 MG 8 hour tablet     100 tablet    Take 650 mg by mouth every 6 hours as needed for mild pain    S/P hysterectomy with oophorectomy       albuterol (5 MG/ML) 0.5%  neb solution    PROVENTIL    60 vial    Take 0.5 mLs (2.5 mg) by nebulization every 4 hours as needed for wheezing or shortness of breath / dyspnea (dyspnea)    COPD exacerbation (H)       alendronate 70 MG tablet    FOSAMAX    12 tablet    Take 1 tablet (70 mg) by mouth every 7 days Swallow with a full glass of water an hour before breakfast and stay upright afterwards.    Osteopenia, unspecified location       alum & mag hydroxide-simethicone 400-400-40 MG/5ML Susp suspension    MYLANTA ES/MAALOX  ES     Take 15-30 mLs by mouth every 4 hours as needed for indigestion    S/P hysterectomy with oophorectomy       ASPIRIN NOT PRESCRIBED    INTENTIONAL    0 each    Antiplatelet medication not prescribed intentionally due to Current anticoagulant therapy (warfarin/enoxaparin)    Type 2 diabetes mellitus with diabetic nephropathy, with long-term current use of insulin (H)       atorvastatin 40 MG tablet    LIPITOR    90 tablet    TAKE 1 TABLET(40 MG) BY MOUTH AT BEDTIME    Mixed hyperlipidemia       betamethasone valerate 0.1 % ointment    VALISONE    15 g    Apply topically daily as needed To inside of ears.    Dermatitis       blood glucose monitoring test strip    no brand specified    3 Month    Use to test blood sugars 3 -4  times daily or as directed        budesonide 0.5 MG/2ML neb solution    PULMICORT    60 ampule    Take 2 mLs (0.5 mg) by nebulization 2 times daily as needed    COPD exacerbation (H)       carvedilol 6.25 MG tablet    COREG    180 tablet    Take 2 tablets (12.5 mg) by mouth 2 times daily (with meals)    Chronic atrial fibrillation (H), Congestive heart failure, unspecified congestive heart failure chronicity, unspecified congestive heart failure type (H)       CINNAMON PO      Take 1,000 mg by mouth daily        clotrimazole 1 % cream    LOTRIMIN    15 g    Apply topically 2 times daily    Tinea corporis       cod liver oil Caps capsule      Take 1 capsule by mouth daily        digoxin 125 MCG  tablet    LANOXIN    90 tablet    Take 1 tablet (125 mcg) by mouth daily    Atrial fibrillation (H)       fluticasone 50 MCG/ACT spray    FLONASE    3 Bottle    Spray 1-2 sprays into both nostrils daily    Nasal congestion       JANTOVEN 2.5 MG tablet   Generic drug:  warfarin     100 tablet    Take 1-2 tablets daily or as directed by INR clinic    Chronic atrial fibrillation (H)       latanoprost 0.005 % ophthalmic solution    XALATAN     Place 1 drop into both eyes At Bedtime.        Lidocaine 0.5 % Aero     170 g    Apply as needed prior to dressing changes    Non-healing wound of lower extremity, left, subsequent encounter       Lidocaine HCl 4 % Crea     63 g    Externally apply topically daily as needed Prior to dressing changes    Non-healing wound of lower extremity, left, subsequent encounter       lisinopril 5 MG tablet    PRINIVIL/ZESTRIL    90 tablet    Take 1 tablet (5 mg) by mouth daily    Type 2 diabetes mellitus with diabetic nephropathy, with long-term current use of insulin (H)       meclizine 25 MG tablet    ANTIVERT    270 tablet    TAKE 1 TABLET(25 MG) BY MOUTH THREE TIMES DAILY AS NEEDED FOR DIZZINESS    Vertigo       nitroGLYcerin 0.4 MG sublingual tablet    NITROSTAT    30 tablet    If chest pain place under tongue call 911 if pain not resolved/ repeat every 5 min for total of 3 doses    Congestive heart failure, unspecified congestive heart failure chronicity, unspecified congestive heart failure type (H)       NovoLIN MIX 70/30 VIAL injection   Generic drug:  insulin NPH-insulin regular     10 mL    INJECT 30 UNITS SUBCUTANEOUSLY TWICE DAILY WITH MEALS.    Type 2 diabetes mellitus with stage 3 chronic kidney disease (H)       nystatin [id number] UNIT/GM Powd    MYCOSTATIN    60 g    Apply topically 3 times daily as needed    Tinea corporis       oxybutynin 5 MG tablet    DITROPAN    90 tablet    Take 1 tablet (5 mg) by mouth daily    Urinary incontinence, unspecified type       pentoxifylline  400 MG CR tablet    TRENtal    270 tablet    TAKE 1 TABLET(400 MG) BY MOUTH THREE TIMES DAILY WITH MEALS    Peripheral vascular disease (H)       polyethylene glycol Packet    MIRALAX/GLYCOLAX     Take 1 packet by mouth 2 times daily as needed for constipation        potassium chloride SA 20 MEQ CR tablet    K-DUR/KLOR-CON M    180 tablet    Take 1 tablet (20 mEq) by mouth 2 times daily    Diastolic dysfunction       torsemide 10 MG tablet    DEMADEX    180 tablet    Take 2 tablets (20 mg) by mouth At Bedtime    CHF (congestive heart failure) (H)       traMADol 50 MG tablet    ULTRAM    240 tablet    TAKE 1 TO 2 TABLETS BY MOUTH EVERY 6 HOURS AS NEEDED FOR PAIN    Chronic pain syndrome       Vitamin B 12 100 MCG Lozg     100 lozenge    Take 1,000 mcg by mouth daily    Vitamin B12 deficiency (non anemic)       vitamin D3 2000 UNITS Caps     90 capsule    Take 2,000 Units by mouth daily    Osteoporosis

## 2017-10-30 NOTE — NURSING NOTE
"Chief Complaint   Patient presents with     Abdominal Pain     soreness under abdomen area, torn skin after bath happened about a week ago, tried keeping it clean and powered but still isn't healing       Initial /76 (BP Location: Left arm, Cuff Size: Adult Regular)  Pulse 70  Temp 97.6  F (36.4  C) (Oral)  Ht 5' 5\" (1.651 m)  Wt 262 lb (118.8 kg)  LMP  (LMP Unknown)  SpO2 97%  Breastfeeding? No  BMI 43.6 kg/m2 Estimated body mass index is 43.6 kg/(m^2) as calculated from the following:    Height as of this encounter: 5' 5\" (1.651 m).    Weight as of this encounter: 262 lb (118.8 kg).  Medication Reconciliation: complete     JOSEPH Hoyt      "

## 2017-10-30 NOTE — PROGRESS NOTES
Chief Complaint:     skin tinea fungal infection of the skin folds of the anterior abdominal wall     HPI:   Patient Malgorzata Lan is a very pleasant 77 year old female with history of chronic morbid obesity who presents to Internal Medicine clinic today for evaluation of Chronic skin tinea fungal infection of the skin folds of the anterior abdominal wall. Patient also has chronic morbid obesity and has significant skin folds of the anterior abdominal wall after some previous weight loss. Regarding the patient's routine vaccinations, the patient is also due for routine flu vaccine at this time.      Current Medications:     Current Outpatient Prescriptions   Medication Sig Dispense Refill     clotrimazole (LOTRIMIN) 1 % cream Apply topically 2 times daily 15 g 11     nystatin (MYCOSTATIN) 826247 UNIT/GM POWD Apply topically 3 times daily as needed 60 g 11     Lidocaine HCl 4 % CREA Externally apply topically daily as needed Prior to dressing changes 63 g 3     NOVOLIN MIX 70/30 VIAL (70-30) 100 UNIT/ML susp INJECT 30 UNITS SUBCUTANEOUSLY TWICE DAILY WITH MEALS. 10 mL 5     traMADol (ULTRAM) 50 MG tablet TAKE 1 TO 2 TABLETS BY MOUTH EVERY 6 HOURS AS NEEDED FOR PAIN 240 tablet 0     lisinopril (PRINIVIL/ZESTRIL) 5 MG tablet Take 1 tablet (5 mg) by mouth daily 90 tablet 3     atorvastatin (LIPITOR) 40 MG tablet TAKE 1 TABLET(40 MG) BY MOUTH AT BEDTIME 90 tablet 3     pentoxifylline (TRENTAL) 400 MG CR tablet TAKE 1 TABLET(400 MG) BY MOUTH THREE TIMES DAILY WITH MEALS 270 tablet 3     potassium chloride SA (K-DUR/KLOR-CON M) 20 MEQ CR tablet Take 1 tablet (20 mEq) by mouth 2 times daily 180 tablet 3     meclizine (ANTIVERT) 25 MG tablet TAKE 1 TABLET(25 MG) BY MOUTH THREE TIMES DAILY AS NEEDED FOR DIZZINESS 270 tablet 3     alendronate (FOSAMAX) 70 MG tablet Take 1 tablet (70 mg) by mouth every 7 days Swallow with a full glass of water an hour before breakfast and stay upright afterwards. 12 tablet 3      fluticasone (FLONASE) 50 MCG/ACT spray Spray 1-2 sprays into both nostrils daily 3 Bottle 3     oxybutynin (DITROPAN) 5 MG tablet Take 1 tablet (5 mg) by mouth daily 90 tablet 3     JANTOVEN 2.5 MG tablet Take 1-2 tablets daily or as directed by INR clinic 100 tablet 3     Lidocaine 0.5 % AERO Apply as needed prior to dressing changes 170 g 1     carvedilol (COREG) 6.25 MG tablet Take 2 tablets (12.5 mg) by mouth 2 times daily (with meals) 180 tablet 3     betamethasone valerate (VALISONE) 0.1 % ointment Apply topically daily as needed To inside of ears. 15 g 2     digoxin (LANOXIN) 125 MCG tablet Take 1 tablet (125 mcg) by mouth daily 90 tablet 3     torsemide (DEMADEX) 10 MG tablet Take 2 tablets (20 mg) by mouth At Bedtime 180 tablet 3     blood glucose monitoring (NO BRAND SPECIFIED) test strip Use to test blood sugars 3 -4  times daily or as directed 3 Month 3     ASPIRIN NOT PRESCRIBED (INTENTIONAL) Antiplatelet medication not prescribed intentionally due to Current anticoagulant therapy (warfarin/enoxaparin) 0 each 0     albuterol (PROVENTIL) (5 MG/ML) 0.5% nebulizer solution Take 0.5 mLs (2.5 mg) by nebulization every 4 hours as needed for wheezing or shortness of breath / dyspnea (dyspnea) 60 vial 1     budesonide (PULMICORT) 0.5 MG/2ML nebulizer solution Take 2 mLs (0.5 mg) by nebulization 2 times daily as needed 60 ampule 1     Cyanocobalamin (VITAMIN B 12) 100 MCG LOZG Take 1,000 mcg by mouth daily 100 lozenge 1     Cholecalciferol (VITAMIN D3) 2000 UNITS CAPS Take 2,000 Units by mouth daily 90 capsule 1     nitroglycerin (NITROSTAT) 0.4 MG SL tablet If chest pain place under tongue call 911 if pain not resolved/ repeat every 5 min for total of 3 doses 30 tablet 3     acetaminophen 650 MG 8 hour tablet Take 650 mg by mouth every 6 hours as needed for mild pain 100 tablet      alum & mag hydroxide-simethicone (MYLANTA ES/MAALOX  ES) 400-400-40 MG/5ML SUSP Take 15-30 mLs by mouth every 4 hours as needed  for indigestion  0     Cod Liver Oil CAPS Take 1 capsule by mouth daily       polyethylene glycol (MIRALAX/GLYCOLAX) packet Take 1 packet by mouth 2 times daily as needed for constipation        CINNAMON PO Take 1,000 mg by mouth daily       latanoprost (XALATAN) 0.005 % ophthalmic solution Place 1 drop into both eyes At Bedtime.           Allergies:      Allergies   Allergen Reactions     Food Anaphylaxis     With green peppers     Codeine Sulfate Swelling     Throats swell     Flagyl [Metronidazole Hcl] Swelling     Hydrocodone Visual Disturbance     Pantoprazole Unknown     Penicillins Unknown     Hasn't had it for 30 years     Diltiazem Rash            Past Medical History:     Past Medical History:   Diagnosis Date     Aortic stenosis, moderate     on echo 3-14      Chronic leg pain      Congestive heart failure, unspecified     worsened during hospitalization/ diastolic failure  echo 3-2014 EF 55-60%/      COPD (chronic obstructive pulmonary disease) (H)      Depression      Diabetes mellitus (H)      Diastolic dysfunction      DJD (degenerative joint disease)      Glaucoma      Hyperlipidemia      Hyperparathyroidism (H)      Hypertension      Influenza A with pneumonia 3-2014    hospitalized      Lung cancer (H)      Morbid obesity (H)      Obstructive sleep apnea     wears CPAP     Pancytopenia (H)      Peripheral vascular disease (H)      Persistent atrial fibrillation (H) 06/08/2004     Pickwickian syndrome (H)      Renal insufficiency      Respiratory failure (H) 3-2014    hospitalized     Sinus node dysfunction (H)     TACHYBRADY SYNDROME s/p PPM     Squamous cell carcinoma of lung, stage I (H)      Syncope          Past Surgical History:     Past Surgical History:   Procedure Laterality Date     APPLY WOUND VAC Right 8/4/2015    Procedure: APPLY WOUND VAC;  Surgeon: Yandel Fair MD;  Location: [facility]     BONE MARROW BIOPSY, BONE SPECIMEN, NEEDLE/TROCAR  10/30/2013    Procedure: BIOPSY BONE  MARROW;  BONE MARROW BIOPSY ;  Surgeon: Kimani Lobo MD;  Location:  GI     CHOLECYSTECTOMY       DAVINCI HYSTERECTOMY TOTAL, BILATERAL SALPINGO-OOPHORECTOMY, NODE DISSECTION, COMBINED N/A 3/30/2016    Procedure: COMBINED DAVINCI HYSTERECTOMY TOTAL, SALPINGO-OOPHORECTOMY, NODE DISSECTION;  Surgeon: Kristin Arriaza MD;  Location:  OR     DILATION AND CURETTAGE N/A 3/18/2016    Procedure: DILATION AND CURETTAGE;  Surgeon: Gilmer Cruz MD;  Location:  OR     GRAFT SKIN SPLIT THICKNESS FROM EXTREMITY Right 8/4/2015    Procedure: GRAFT SKIN SPLIT THICKNESS FROM EXTREMITY;  Surgeon: Yandel Fair MD;  Location:  SD     IMPLANT PACEMAKER  7/31/2013    Single chamber [place] [facility] PPM      IRRIGATION AND DEBRIDEMENT LOWER EXTREMITY, COMBINED Right 7/10/2015    Procedure: COMBINED IRRIGATION AND DEBRIDEMENT LOWER EXTREMITY;  Surgeon: Yandel Fair MD;  Location:  OR     ORTHOPEDIC SURGERY      t oes     SINUS SURGERY       THORACOSCOPIC WEDGE RESECTION LUNG Right 3/17/2015    Procedure: THORACOSCOPIC WEDGE RESECTION LUNG;  Surgeon: Santos Crenshaw MD;  Location:  OR     tonsils           Family Medical History:     Family History   Problem Relation Age of Onset     CANCER Mother 83     Lung cancer     GASTROINTESTINAL DISEASE Father 69     Ulcer bleed     HEART DISEASE Brother 58     CANCER Sister 60     Lung cancer     CANCER Sister 58     Lung cancer         Social History:     Social History     Social History     Marital status:      Spouse name: N/A     Number of children: N/A     Years of education: N/A     Occupational History     Not on file.     Social History Main Topics     Smoking status: Former Smoker     Packs/day: 1.00     Years: 30.00     Types: Cigarettes     Quit date: 8/14/1993     Smokeless tobacco: Never Used     Alcohol use 0.0 oz/week     0 Standard drinks or equivalent per week      Comment: 2 a week      Drug use: No     Sexual  activity: Not Currently     Partners: Male     Other Topics Concern     Parent/Sibling W/ Cabg, Mi Or Angioplasty Before 65f 55m? No     Caffeine Concern Yes     3-4 cups of coffee     Special Diet No     Exercise No     Social History Narrative        _______        Malgorzata Lan  MRN# [id number]     YOB: 1940  Age: 75 year old         Date of Admission: 8/4/2015    Date of Discharge: 8/5/2015    Admitting Physician: Yandel Fair MD    Discharging Service: [facility] General Surgery     Primary Provider: Michelle Reed (General)        Discharge Diagnosis:     Principle Diagnosis:    right calf wound    Wound, open    Calf ulcer, right, with unspecified severity        Brief HPI: Malgorzata Lan is a 75 year old year-old female who presented to the [facility] for an elective split thickness skin grafting. She was being followed closely as an outpatient in wound clinic by Dr. Fair. She recently developed a significant ulceration in her right anterior lateral calf. Operative debridement was undertaken previously with good results with plans for eventual STSG which was undertaken 8/5/15 without complications.    Hospital Course: Malgorzata Lan underwent the above named procedure without complications. Please see op note for further details. The patient had a routine hospital course and recovered as anticipated. By POD #1, she had advanced to a regular diet and was transitioned successfully to oral pain medications. She was deemed ready for discharge on POD #1. She will follow-up in wound clinic on Monday 8/10/15 as previously scheduled for wound check.         Inpatient Consultations: No consultations were requested during this admission        Procedures:     Split thickness skin grafting        Labs/Imaging:     Results for orders placed or performed during the hospital encounter of 08/04/15 (from the past 24 hour(s))     Potassium     Result  Value  Ref Range  "     Potassium  4.2  3.4 - 5.3 mmol/L     INR     Result  Value  Ref Range      INR  2.84 (H)  0.86 - 1.14     Hemoglobin     Result  Value  Ref Range      Hemoglobin  9.0 (L)  11.7 - 15.7 g/dL     Glucose by meter     Result  Value  Ref Range      Glucose  183 (H)  70 - 99 mg/dL     Glucose by meter     Result  Value  Ref Range      Glucose  154 (H)  70 - 99 mg/dL     Glucose by meter     Result  Value  Ref Range      Glucose  142 (H)  70 - 99 mg/dL     Glucose by meter     Result  Value  Ref Range      Glucose  126 (H)  70 - 99 mg/dL     Glucose by meter     Result  Value  Ref Range      Glucose  118 (H)  70 - 99 mg/dL     INR     Result  Value  Ref Range      INR  3.89 (H)  0.86 - 1.14             Disposition:     Discharged to home         Discharge Condition    Discharge condition:  Stable     Discharge vitals:  Blood pressure 112/59, pulse 83, temperature 97.8  F (36.6  C), temperature source Oral, resp. rate 16, height 1.651 m (5' 5\"), weight 134.718 kg (297 lb), SpO2 100 %, not currently breastfeeding.         Discharge Medications:     Current Discharge Medication List         START taking these medications      Details     !! oxyCODONE-acetaminophen (PERCOCET) 5-325 MG per tablet  Take 1-2 tablets by mouth every 6 hours as needed for moderate to severe pain    Qty: 20 tablet, Refills: 0      Associated Diagnoses: Acute post-operative pain         !! - Potential duplicate medications found. Please discuss with provider.         CONTINUE these medications which have NOT CHANGED      Details     !! oxyCODONE-acetaminophen (PERCOCET) 5-325 MG per tablet  Take 1 tablet by mouth every 6 hours as needed for moderate to severe pain    Qty: 120 tablet, Refills: 0      Associated Diagnoses: Chronic pain         insulin NPH-insulin regular (NOVOLIN MIX 70/30 VIAL) VIAL 100 UNITS/ML susp  20 units before breakfast 10 units before dinner    Qty: 3 Month, Refills: 3      Associated Diagnoses: Diabetes mellitus, type 2  "        pentoxifylline (TRENTAL) 400 MG CR tablet  Take 1 tablet (400 mg) by mouth 3 times daily (with meals) Office visit due for additional refills. Call [phone] to schedule.    Qty: 90 tablet, Refills: 0      Associated Diagnoses: Cramp of limb         torsemide (DEMADEX) 10 MG tablet  Take 2 daily may on occasion double to 4 a day prn edema    Qty: 270 tablet, Refills: 1      Associated Diagnoses: Cardiomyopathy         fluticasone (FLONASE) 50 MCG/ACT nasal spray  Spray 1-2 sprays into both nostrils daily    Qty: 1 Package, Refills: 11      Associated Diagnoses: Right chronic serous otitis media         atorvastatin (LIPITOR) 40 MG tablet  Take 1 tablet (40 mg) by mouth daily Office visit with fasting labs due for additional refills. Call [phone] to schedule.    Qty: 30 tablet, Refills: 0      Associated Diagnoses: Other and unspecified hyperlipidemia         traMADol (ULTRAM) 50 MG tablet  Take 1 tablet (50 mg) by mouth every 6 hours as needed for moderate pain    Qty: 120 tablet, Refills: 3      Associated Diagnoses: Peripheral vascular disease         polyethylene glycol (MIRALAX/GLYCOLAX) packet  Take 1 packet by mouth daily as needed         lisinopril (PRINIVIL,ZESTRIL) 10 MG tablet  Take 0.5 tablets (5 mg) by mouth daily    Qty: 90 tablet, Refills: 3      Associated Diagnoses: Hypertension         potassium chloride SA (K-DUR,KLOR-CON M) 20 MEQ tablet  Take 1 tablet (20 mEq) by mouth 2 times daily    Qty: 180 tablet, Refills: 1      Associated Diagnoses: Hypertension; Congestive heart failure, unspecified         meclizine (ANTIVERT) 25 MG tablet  Take 1 tablet (25 mg) by mouth 4 times daily as needed    Qty: 360 tablet, Refills: 3      Associated Diagnoses: Dizzy         calcitonin, salmon, (MIACALCIN) 200 UNIT/ACT nasal spray  Spray 1 spray into one nostril alternating nostrils daily Alternate nostril each day.    Qty: 3 Bottle, Refills: 3      Associated Diagnoses: Osteoporosis, unspecified          oxybutynin (DITROPAN) 5 MG tablet  TAKE 1 TABLET BY MOUTH TWICE DAILY    Qty: 180 tablet, Refills: 3      Associated Diagnoses: Unspecified urinary incontinence         carvedilol (COREG) 6.25 MG tablet  Take 1 tablet (6.25 mg) by mouth 2 times daily (with meals)    Qty: 60 tablet, Refills: 1      Associated Diagnoses: Cardiomyopathy         betamethasone valerate (VALISONE) 0.1 % ointment  Apply topically 2 times daily    Qty: 15 g, Refills: 0         Cholecalciferol (VITAMIN D3 PO)  Take 2,000 Units by mouth daily         budesonide (PULMICORT) 0.5 MG/2ML nebulizer solution  Take 2 mLs (0.5 mg) by nebulization 2 times daily    Qty: 3 Box, Refills: 1      Associated Diagnoses: COPD exacerbation         Cyanocobalamin (VITAMIN B 12 PO)  Take 1,000 mcg by mouth daily         CINNAMON PO  Take 1,000 mg by mouth daily         latanoprost (XALATAN) 0.005 % ophthalmic solution  Place 1 drop into both eyes At Bedtime.         WARFARIN SODIUM PO  Take 5 mg by mouth daily         collagenase ointment  Apply topically daily    Qty: 90 g, Refills: 3      Associated Diagnoses: Open wound         lidocaine (XYLOCAINE) 2 % jelly  Apply topically as needed for moderate pain    Qty: 85 mL, Refills: 3      Associated Diagnoses: Open wound         ipratropium - albuterol 0.5 mg/2.5 mg/3 mL (DUONEB) 0.5-2.5 (3) MG/3ML nebulization  Take 1 vial (3 mLs) by nebulization 4 times daily    Qty: 360 mL, Refills: 5      Associated Diagnoses: COPD exacerbation         albuterol (PROVENTIL) (5 MG/ML) 0.5% nebulizer solution  Take 0.5 mLs (2.5 mg) by nebulization every 4 hours as needed for wheezing or shortness of breath / dyspnea (dyspnea)    Qty: 60 vial      Associated Diagnoses: COPD exacerbation         Nitroglycerin (NITROSTAT SL)  Place 0.4 mg under the tongue Repeat in 5 min x 2 if not gone.         !! - Potential duplicate medications found. Please discuss with provider.                 Discharge Instructions:     Follow-up  Appointments     Follow-up and recommended labs and tests     Follow up with Dr. Fair , at (location with clinic name or city)     [facility] wound clinic, as previously scheduled. No follow up labs or test     are needed.     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 The following problems were addressed during her hospitalization:    RLE Non-healing wound    long-standing right lower extremity wound, nonhealing, with purulent debris.     Previously biopsied to rule out cancer.     X-ray of the lower extremity as well as an ultrasound of the arterial flow was completed without acute abnormality per report.     Treated with ABX initially with Vanco and Ertapenem.     Seen by Surgery Dr. Fair and taken for debridement of the wound on 7/10.     Also seen by ID. Symptoms improved with regards to pain s/p debridement.     Wound cultures + for E.Coli, Pseudomonas, GNR and enterococcus. BC 1 of 2 on 7/8 + for Strep viridans. Repeats on 7/9 and 7/10 NGTD.     Suspect contaminant.     Antibiotics not continued on discharge as debridement was felt to be sufficient.     To follow up with Dr. Fair in the [facility] to have a wound vac placed for 1 week and then to undergo a skin graft. Continue with Aquacel for wound.         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 To Follow with [facility].         H/o Chronic atrial fibrillation/CHF/HTN/HLD:     The patient has a pacemaker. Last echocardiogram from 6/2014 was 55% to 60%. She currently demonstrates no signs of volume overload, but does have some chronic lower  extremity edema. Fluids discontinued, but intake remains below baseline with borderline BP. Continue with prior to admission lisinopril, Torsemide and Coreg. Warfarin held for procedure but resumed post op. INR was 1.43 on d/c. Bridged with Lovenox gien her CHADS2 of 4.         Insulin-dependent diabetes mellitus:     Recent hemoglobin A1c is 6.4%. Home regimen includes Novolin 70/30 insulin 30 units with breakfast and 20-26 units with dinner. Was on 15 units due to decreased po intake and increased to 20 Q am 7/11. C/w 10 units at dinner. This is the dose she was discharged on. To adjust back to her pta dose as needed.         Chronic kidney disease, stage 3:     Baseline creatinine is 1.1-1.3. Currently at her baseline.         Pancytopenia:     This is chronic. She also has a history of MGUS with a negative bone marrow biopsy in 2013. Iron studies, b12, folate, retic, ps completed.             Consultations This Hospital Stay     SOCIAL WORK IP CONSULT    VASCULAR SURGERY IP CONSULT    INFECTIOUS DISEASES IP CONSULT        INR     Home RN to draw and send to PCP/INR Clinic.     Home care nursing referral     Home care PT referral     Home care OT referral     Follow Up and recommended labs and tests     Follow up with primary care provider, Michelle Reed within 14 days for hospital follow- up. No follow up labs or test are needed.    Follow up with Dr. Fair at the [facility] for continued RLE wound care. Plan is for a wound vac x 1 week to be applied in clinic followed by a skin graft.     Discharge Instructions     Continue with Lovenox BID until INR 2-3.    Adjust Lantus based on Finger sticks when you return home. You were on 30 units in the morning and 20 Units at night prior to coming to the hospital. I have reduced this to 20 units in the morning and 10 units at night due to you not requiring as much insulin here.     MD face to face encounter     Nurse is needed for complex aftercare of surgical  "procedures because the patient needs instruction and cannot perform care on their own due to location of wound and steps involved.     Physical therapy services are needed to assess and treat the following functional impairments: Deconditioning .     Occupational therapy services are needed to assess and treat cognitive ability and address ADL safety due to impairment in Ambulation and deconditioning.                Results for orders placed or performed during the hospital encounter of 07/08/15     US Lower Extremity Arterial Duplex Right      Narrative           Impression      IMPRESSION:    1. Patent right lower extremity arterial vessels without evidence of    significant stenotic or occlusive disease.    2. Right popliteal cyst.          Impression: Deep venous insufficiency in the right common femoral vein    and superficial venous insufficiency in the right greater saphenous    vein as described above.        HAROON VANG MD                    Review of System:     Constitutional: Negative for fever or chills  Skin: Positive for skin tinea fungal infection of the skin folds of the anterior abdominal wall   Ears/Nose/Throat: Negative for nasal congestion, sore throat  Respiratory: No shortness of breath, dyspnea on exertion, cough, or hemoptysis  Cardiovascular: Negative for chest pain  Gastrointestinal: Negative for nausea, vomiting  Genitourinary: Negative for dysuria, hematuria  Musculoskeletal: Negative for myalgias  Neurologic: Negative for headaches  Psychiatric: Negative for depression, anxiety  Hematologic/Lymphatic/Immunologic: Negative  Endocrine: Negative  Behavioral: Negative for tobacco use       Physical Exam:   /76 (BP Location: Left arm, Cuff Size: Adult Regular)  Pulse 70  Temp 97.6  F (36.4  C) (Oral)  Ht 5' 5\" (1.651 m)  Wt 262 lb (118.8 kg)  LMP  (LMP Unknown)  SpO2 97%  Breastfeeding? No  BMI 43.6 kg/m2    GENERAL: morbidly obese, alert and no distress  EYES: eyes " grossly normal to inspection, and conjunctivae and sclerae normal  HENT: Normocephalic atraumatic. Nose and mouth without ulcers or lesions  NECK: supple  RESP: lungs clear to auscultation   CV: regular rate and rhythm, normal S1 S2  LYMPH: no peripheral edema   ABDOMEN: nondistended  MS: Patient is wheelchair dependent.  SKIN: Chronic appearing skin tinea fungal infection of the skin folds of the anterior abdominal wall present with significant erythema   NEURO: Alert & Oriented x 3.   PSYCH: mentation appears normal, affect normal      ASSESSMENT/PLAN:       (B35.4) Tinea corporis  (primary encounter diagnosis)  Comment: Chronic appearing skin tinea fungal infection of the skin folds of the anterior abdominal wall in the setting of chronic morbid obesity.  Plan: I have prescribed anti-fungal treatments with clotrimazole (LOTRIMIN) 1 % cream, nystatin (MYCOSTATIN) 842649 UNIT/GM POWD today. In the meantime, the patient is advised to try to keep the abdominal wall skin folds skin areas as dry as possible.      (Z23) Need for prophylactic vaccination and inoculation against influenza  Comment: Patient is due for flu vaccine.  Plan: I have ordered FLU VACCINE, INCREASED ANTIGEN, PRESV FREE, AGE 65+ [40239], ADMIN INFLUENZA (For MEDICARE Patients ONLY) [] in clinic today.        Follow Up Plan:     Patient is instructed to return to Internal Medicine clinic for follow-up visit in 1 month.        Alisson Richter MD  Internal Medicine  [facility]    Injectable Influenza Immunization Documentation    1.  Is the person to be vaccinated sick today?   No    2. Does the person to be vaccinated have an allergy to a component   of the vaccine?   No  Egg Allergy Algorithm Link    3. Has the person to be vaccinated ever had a serious reaction   to influenza vaccine in the past?   No    4. Has the person to be vaccinated ever had Guillain-Barré syndrome?   No    Form completed by Patient

## 2017-10-30 NOTE — PROGRESS NOTES
[facility]    HISTORY OF PRESENT ILLNESS: Ms. Malgorzata Lan is a 77-year-old female who returns to the [facility] today to follow-up on a left lower leg venous ulceration. She has been followed by Dr. Fair as well as myself. Her wound continues to improve. She has seen progress with more frequent debridements and tighter compression.      WOUND CARE: Iodofoam covered with an ABD and SpandaGrip E changed 3x a week.     VITALS: /74 (BP Location: Left arm)  Pulse 93  Temp 98.5  F (36.9  C) (Tympanic)  LMP  (LMP Unknown)    PHYSICAL EXAM:  GENERAL: Patient is alert and oriented and in no acute distress  INTEGUMENTARY:   WOUND ASSESSMENT:     Location: left anterior lower leg     Stage/Thickness: Full    Size: 8.0 cm x 7.0 cm with a depth of 0.1 cm    Drainage: copious amount of serosanguinous drainage    Wound description: primarily granular with focal areas of necrotic tissue    PROCEDURE: Per standing orders, topical 4% lidocaine was applied to the wound by the [facility]. Timeout was called and informed consent was obtained. Using a denae curette a surgical debridement was performed down to and including subcutaneous tissue of more than 20 but less than 40 cm. Hemostasis was achieved with pressure. The patient tolerated the procedure well.     ASSESSMENT: improving venous ulcerations of left lower leg with fat layer exposed    PLAN:   1. Primary dressing: Iodofoam; Secondary dressing: ABD pad, SpandaGrip E  2. Add on vinegar soaks  3. May consider switching dressing at next visit    FOLLOW-UP: 2 weeks    NIMCO REDD PA-C

## 2017-10-31 ENCOUNTER — ANTICOAGULATION THERAPY VISIT (OUTPATIENT)
Dept: FAMILY MEDICINE | Facility: CLINIC | Age: 77
End: 2017-10-31
Payer: MEDICARE

## 2017-10-31 ENCOUNTER — TELEPHONE (OUTPATIENT)
Dept: FAMILY MEDICINE | Facility: CLINIC | Age: 77
End: 2017-10-31

## 2017-10-31 DIAGNOSIS — I48.0 PAROXYSMAL ATRIAL FIBRILLATION (H): ICD-10-CM

## 2017-10-31 DIAGNOSIS — Z79.01 LONG-TERM (CURRENT) USE OF ANTICOAGULANTS: ICD-10-CM

## 2017-10-31 LAB — INR PPP: 1.6

## 2017-10-31 PROCEDURE — 99207 ZZC NO CHARGE NURSE ONLY: CPT | Performed by: INTERNAL MEDICINE

## 2017-10-31 NOTE — MR AVS SNAPSHOT
Malgorzata Lan   10/31/2017   Anticoagulation Therapy Visit    Description:  77 year old female   Provider:  Clair Michelle DO   Department:  [facility]           INR as of 10/31/2017     Today's INR 1.6!      Anticoagulation Summary as of 10/31/2017     INR goal 2.0-3.0   Today's INR 1.6!   Full instructions 10/31: 10 mg; Otherwise 5 mg every day   Next INR check 11/7/2017    Indications   Long-term (current) use of anticoagulants [Z79.01] [Z79.01]  Atrial fibrillation (H) [I48.91]         Description      INR     Who is calling?  patient     Phone number:  [phone]     Fax number:       Name of caller: Malgorzata Lan     INR Value:  1.6      October 2017 Details    Sun Mon Tue Wed Thu Fri Sat     1               2               3               4               5               6               7                 8               9               10               11               12               13               14                 15               16               17               18               19               20               21                 22               23               24               25               26               27               28                 29               30               31      10 mg   See details           Date Details   10/31 This INR check               How to take your warfarin dose     To take:  10 mg Take 4 of the 2.5 mg tablets.           November 2017 Details    Sun Mon Tue Wed Thu Fri Sat        1      5 mg         2      5 mg         3      5 mg         4      5 mg           5      5 mg         6      5 mg         7            8               9               10               11                 12               13               14               15               16               17               18                 19               20               21               22               23               24               25                 26               27                28               29               30                  Date Details   No additional details    Date of next INR:  11/7/2017         How to take your warfarin dose     To take:  5 mg Take 2 of the 2.5 mg tablets.

## 2017-10-31 NOTE — PROGRESS NOTES
ANTICOAGULATION FOLLOW-UP CLINIC VISIT    Patient Name:  Malgorzata Lan  Date:  10/31/2017  Contact Type:  Telephone/ Patient - Home [facility] INR    SUBJECTIVE:        OBJECTIVE    INR   Date Value Ref Range Status   10/31/2017 1.6  Final       ASSESSMENT / PLAN  INR assessment SUB    Recheck INR In: 1 WEEK    INR Location Home INR      Anticoagulation Summary as of 10/31/2017     INR goal 2.0-3.0   Today's INR 1.6!   Maintenance plan 5 mg (2.5 mg x 2) every day   Full instructions 10/31: 10 mg; Otherwise 5 mg every day   Weekly total 35 mg   Plan last modified Marifer Zaragoza RN (10/24/2017)   Next INR check 11/7/2017   Priority INR   Target end date     Indications   Long-term (current) use of anticoagulants [Z79.01] [Z79.01]  Atrial fibrillation (H) [I48.91]         Anticoagulation Episode Summary     INR check location Home Draw    Preferred lab     Send INR reminders to [facility]    Comments ALERE Home INR      Anticoagulation Care Providers     Provider Role Specialty Phone number    ReedMichelle MD [facility] [phone]            See the Encounter Report to view Anticoagulation Flowsheet and Dosing Calendar (Go to Encounters tab in chart review, and find the Anticoagulation Therapy Visit)    Patient denies any changes. She confirms and agrees with dosing instructions as above. Dosing based on [facility] Protocol and Provider directed care plan.      Bharati Saini RN

## 2017-10-31 NOTE — TELEPHONE ENCOUNTER
Reason for Call:  INR    Who is calling?  patient    Phone number:  [phone]    Fax number:      Name of caller: Malgorzata Lan    INR Value:  1.6    Are there any other concerns:  No    Can we leave a detailed message on this number? YES    Phone number patient can be reached at: Home number on file [phone] (home)      Call taken on 10/31/2017 at 10:56 AM by Angie Anaya

## 2017-11-04 DIAGNOSIS — G89.4 CHRONIC PAIN SYNDROME: ICD-10-CM

## 2017-11-04 NOTE — TELEPHONE ENCOUNTER
Pending Prescriptions:                       Disp   Refills    traMADol (ULTRAM) 50 MG tablet [Pharmacy *240 ta*0            Sig: TAKE 1 TO 2 TABLETS BY MOUTH EVERY 6 HOURS AS           NEEDED FOR PAIN    LOV: 8/9/17 Miguel Angel      Last Written Prescription Date:  9/15/17  Last Fill Quantity: 240,   # refills: 0  Future Office visit:    Next 5 appointments (look out 90 days)     Nov 07, 2017  2:15 PM CST   Return Visit with Ed Shine MD   [facility] ([facility])    [address]  [place] [address]   [phone]            Nov 13, 2017  1:00 PM CST   Return Visit with Jayde Mckinney PA-C   [facility] [place] ([facility])    [address]  [place] [address]   [phone]                   Routing refill request to provider for review/approval because:  Drug not on the [facility], [facility] or [facility] refill protocol or controlled substance    RT Jess(R)

## 2017-11-06 RX ORDER — TRAMADOL HYDROCHLORIDE 50 MG/1
TABLET ORAL
Qty: 240 TABLET | Refills: 0 | Status: SHIPPED | OUTPATIENT
Start: 2017-11-06 | End: 2018-01-02

## 2017-11-07 ENCOUNTER — ALLIED HEALTH/NURSE VISIT (OUTPATIENT)
Dept: CARDIOLOGY | Facility: CLINIC | Age: 77
End: 2017-11-07
Payer: MEDICARE

## 2017-11-07 ENCOUNTER — OFFICE VISIT (OUTPATIENT)
Dept: CARDIOLOGY | Facility: CLINIC | Age: 77
End: 2017-11-07
Attending: INTERNAL MEDICINE
Payer: MEDICARE

## 2017-11-07 VITALS
BODY MASS INDEX: 42.82 KG/M2 | HEIGHT: 65 IN | SYSTOLIC BLOOD PRESSURE: 110 MMHG | DIASTOLIC BLOOD PRESSURE: 60 MMHG | WEIGHT: 257 LBS

## 2017-11-07 DIAGNOSIS — I10 ESSENTIAL HYPERTENSION: ICD-10-CM

## 2017-11-07 DIAGNOSIS — I48.20 CHRONIC ATRIAL FIBRILLATION (H): ICD-10-CM

## 2017-11-07 DIAGNOSIS — I49.5 SSS (SICK SINUS SYNDROME) (H): ICD-10-CM

## 2017-11-07 DIAGNOSIS — E78.2 MIXED HYPERLIPIDEMIA: ICD-10-CM

## 2017-11-07 DIAGNOSIS — Z95.0 CARDIAC PACEMAKER IN SITU: Primary | ICD-10-CM

## 2017-11-07 DIAGNOSIS — I35.9 AORTIC VALVE DISEASE: ICD-10-CM

## 2017-11-07 DIAGNOSIS — I42.9 CARDIOMYOPATHY, UNSPECIFIED TYPE (H): ICD-10-CM

## 2017-11-07 DIAGNOSIS — Z95.0 CARDIAC PACEMAKER IN SITU: ICD-10-CM

## 2017-11-07 PROCEDURE — 99214 OFFICE O/P EST MOD 30 MIN: CPT | Performed by: INTERNAL MEDICINE

## 2017-11-07 PROCEDURE — 93279 PRGRMG DEV EVAL PM/LDLS PM: CPT | Performed by: INTERNAL MEDICINE

## 2017-11-07 NOTE — PROGRESS NOTES
[employer] (S) Pacemaker Device Check  : 22 %  Mode: VVI 60        Underlying Rhythm: Chronic AF rates 50s-90s  Heart Rate: stable with good variability. Histogram shows about 20% of beats greater than 100 bpm  Sensing: WNL    Pacing Threshold: WNL   Impedance: WNL  Battery Status: estimated 6.5 years longevity remaining  Atrial Arrhythmia: Chronic AF, patient takes Jantoven  Ventricular Arrhythmia: 10 episodes logged as NSVT, 9 occurred yesterday,11/6/2017. around 2330. 2 EGMs for review, both show AF/RVR, average rates 170s-190s. Longest duration about 44 seconds (no EGM).   Setting Change: no changes made today    Care Plan: Continue with Q3 month remote checks on 2/7/2018. Patient is seeing Dr. Shine today. Hyun

## 2017-11-07 NOTE — LETTER
11/7/2017    Clair Michelle, DO  [address]  [place] [address]    RE: Malgorzata Barrazalee ann       Dear Colleague,    I had the pleasure of seeing Malgorzata Lan in the [facility].    I saw Malgorzata today, who at age 77 is wheelchair bound.  She comes very nicely dressed.  She is overweight, of course, although her weight is down from 286 to 257 pounds in the last 6 months.  She says she has been eating less and just trying to lose weight.      She denies chest pain, palpitations, shortness breath, dizziness or syncope.  She is, of course, short of breath if she pushes herself, but she does not do much in the way of walking anymore.        She is in the middle of rediscovering an old friend that she dated in high school whose wife passed away.  In concert Malgorzata's  passed away.  They have rekindled an old friendship, and it is quite exciting for her.      CURRENT MEDICATIONS:  Listed in Epic and include:   1.  Lisinopril 5 mg a day.   2.  Potassium 20 mEq b.i.d.   3.  Atorvastatin 40 mg at bedtime.   4.  Demadex 20 mg a day.   5.  Lanoxin 0.125 mg a day.   6.  Carvedilol 6.25 mg twice a day.   7.  Jantoven 2.5 mg as directed for an INR of 2.0-3.0.   8.  Ditropan an 5 mg a day.   She also takes Flonase, Fosamax, Antivert, Trental, Ultram, among others.      PHYSICAL EXAMINATION:    VITAL SIGNS:  Physical exam revealed that she was asymptomatic with a blood pressure 110/60, heart rate was 70 beats a minute.   HEAD AND NECK:  She had no neck vein distention or bruits at 90 degrees.   HEART:  Heart tones were distant and a bit regular.  There was a 1/6 systolic ejection murmur in the aortic area that radiated poorly.   LUNGS:  Clear.   ABDOMEN:  Soft, pendulous, nontender.   EXTREMITIES:  Showed bilateral +1 edema with chronic venous stasis changes.     SKIN:  She has a pacemaker that is well-healed in the left subclavian space.      Following up her pacemaker, she has a  single-lead in the right ventricle.  She paces in the ventricle 22% of the time.  She occasionally has heart rates of her own greater than 100 beats per minute.  She has had occasional nonsustained VT, but as best I can tell, no dizziness or symptoms with that.  The longest seem to run around 44 seconds.      INRs have generally been a little subtherapeutic.  From a cardiac standpoint a year ago, her heart showed an EF of 50%-55%.  The left atrium was moderately dilated.  There was +1 aortic insufficiency, mild aortic stenosis, no evidence of pulmonary hypertension or right heart failure, and this fits her clinical picture now.      IMPRESSION:   1.  Mild aortic stenosis and mild aortic insufficiency.   2.  LV function lower limits of normal without overt signs of left heart failure.   3.  Chronic atrial fibrillation.   4.  Asymptomatic occasional nonsustained VT.   5.  Treated hypertension.   6.  Treated hyperlipidemia.   7.  Chronically overweight.   8.  Diastolic heart failure.   9.  Chronic renal insufficiency.   10.  VVIR pacemaker functioning properly.      PLAN:  I made no changes.  I did not check any lab work.  I discussed her multiple cardiac issues with her.  She was pleased with her stability, and so was I.  She is really looking forward to this relationship with her old friend.     Again, thank you for allowing me to participate in the care of your patient.      Sincerely,    SIMEON MARSH MD     [facility]

## 2017-11-07 NOTE — MR AVS SNAPSHOT
After Visit Summary   11/7/2017    Malgorzata Lan    MRN: [id number]           Patient Information     Date Of Birth          1940        Visit Information        Provider Department      11/7/2017 1:00 PM BUCIO [facility]        Today's Diagnoses     Cardiac pacemaker in situ    -  1    SSS (sick sinus syndrome) (H)           Follow-ups after your visit        Your next 10 appointments already scheduled     Nov 07, 2017  2:15 PM CST   Return Visit with Ed Shine MD   [facility] ([facility])    [address]  [place] [address]   [phone]            Nov 13, 2017  1:00 PM CST   Return Visit with Jayde Mckinney PA-C   [facility] [place] ([facility])    [address]  [address]  [place] [address]   [phone]            Feb 07, 2018  2:00 PM CST   Remote PPM Check with BUCIO TECH1   [facility] ([facility])    [address]  [place] [address]   [phone]           This appointment is for a remote check of your pacemaker.  This is not an appointment at the office.              If Something Goes Wrong     I will check my blood sugars twice a day     Notes - Note created  7/17/2014 10:25 AM by Jeannine Baker RN    As of today's date 7/17/2014 goal is met at 76 - 100%.   Goal Status:  Active        Who to contact     If you have questions or need follow up information about today's clinic visit or your schedule please contact [facility] directly at [phone].  Normal or non-critical lab and imaging results will be communicated to you by MyChart, letter or phone within 4 business days after the clinic has received the results. If you do not hear from us within 7 days, please contact the clinic through MyChart or phone. If  "you have a critical or abnormal lab result, we will notify you by phone as soon as possible.  Submit refill requests through [employer] or call your pharmacy and they will forward the refill request to us. Please allow 3 business days for your refill to be completed.          Additional Information About Your Visit        [employer] Information     [employer] lets you send messages to your doctor, view your test results, renew your prescriptions, schedule appointments and more. To sign up, go to [email or web address] . Click on \"Log in\" on the left side of the screen, which will take you to the Welcome page. Then click on \"Sign up Now\" on the right side of the page.     You will be asked to enter the access code listed below, as well as some personal information. Please follow the directions to create your username and password.     Your access code is: [email or web address]  Expires: 2018  1:38 PM     Your access code will  in 90 days. If you need help or a new code, please call your [facility] clinic or [phone].        Care EveryWhere ID     This is your Care EveryWhere ID. This could be used by other organizations to access your [facility] medical records  [id number]        Your Vitals Were     Last Period                   (LMP Unknown)            Blood Pressure from Last 3 Encounters:   10/30/17 120/74   10/30/17 110/76   10/16/17 116/71    Weight from Last 3 Encounters:   10/30/17 118.8 kg (262 lb)   17 122.5 kg (270 lb)   17 123.7 kg (272 lb 11.2 oz)              We Performed the Following     PM DEVICE PROGRAMMING EVAL, SINGLE LEAD PACER ([id number])        Primary Care Provider Office Phone # Fax #    Clair Segundo Miguel Angel,  [phone] [address]  [place] [address]        Goals        General    I will call my PCP right away if I have increased shortness of breath.   (pt-stated)     Notes - Note created  2014 10:27 AM by Jeannine Baker RN    As of today's date 2014 " goal is met at 51 - 75%.   Goal Status:  Active      I will weigh myself daily and records results (pt-stated)     Notes - Note created  7/17/2014 10:25 AM by Jeannine Baker RN    As of today's date 7/17/2014 goal is met at 76 - 100%.   Goal Status:  Active        Equal Access to Services     [facility]: Hadii aad ku hadasho Soomaali, waaxda luqadaha, qaybta kaalmada adeegyada, waxnaldo brianain hayaan adehiram ayala ladimasen . So [facility] [phone].    ATENCIÓN: Si habla español, tiene a bergeron disposición servicios gratuitos de asistencia lingüística. Llame al [phone].    We comply with applicable federal civil rights laws and [place] laws. We do not discriminate on the basis of race, color, national origin, age, disability, sex, sexual orientation, or gender identity.            Thank you!     Thank you for choosing [facility]  for your care. Our goal is always to provide you with excellent care. Hearing back from our patients is one way we can continue to improve our services. Please take a few minutes to complete the written survey that you may receive in the mail after your visit with us. Thank you!             Your Updated Medication List - Protect others around you: Learn how to safely use, store and throw away your medicines at www.disposemymeds.org.          This list is accurate as of: 11/7/17  1:38 PM.  Always use your most recent med list.                   Brand Name Dispense Instructions for use Diagnosis    acetaminophen 650 MG 8 hour tablet     100 tablet    Take 650 mg by mouth every 6 hours as needed for mild pain    S/P hysterectomy with oophorectomy       albuterol (5 MG/ML) 0.5% neb solution    PROVENTIL    60 vial    Take 0.5 mLs (2.5 mg) by nebulization every 4 hours as needed for wheezing or shortness of breath / dyspnea (dyspnea)    COPD exacerbation (H)       alendronate 70 MG tablet    FOSAMAX    12 tablet    Take 1 tablet (70 mg) by mouth every 7 days  Swallow with a full glass of water an hour before breakfast and stay upright afterwards.    Osteopenia, unspecified location       alum & mag hydroxide-simethicone 400-400-40 MG/5ML Susp suspension    MYLANTA ES/MAALOX  ES     Take 15-30 mLs by mouth every 4 hours as needed for indigestion    S/P hysterectomy with oophorectomy       ASPIRIN NOT PRESCRIBED    INTENTIONAL    0 each    Antiplatelet medication not prescribed intentionally due to Current anticoagulant therapy (warfarin/enoxaparin)    Type 2 diabetes mellitus with diabetic nephropathy, with long-term current use of insulin (H)       atorvastatin 40 MG tablet    LIPITOR    90 tablet    TAKE 1 TABLET(40 MG) BY MOUTH AT BEDTIME    Mixed hyperlipidemia       betamethasone valerate 0.1 % ointment    VALISONE    15 g    Apply topically daily as needed To inside of ears.    Dermatitis       blood glucose monitoring test strip    no brand specified    3 Month    Use to test blood sugars 3 -4  times daily or as directed        budesonide 0.5 MG/2ML neb solution    PULMICORT    60 ampule    Take 2 mLs (0.5 mg) by nebulization 2 times daily as needed    COPD exacerbation (H)       carvedilol 6.25 MG tablet    COREG    180 tablet    Take 2 tablets (12.5 mg) by mouth 2 times daily (with meals)    Chronic atrial fibrillation (H), Congestive heart failure, unspecified congestive heart failure chronicity, unspecified congestive heart failure type (H)       CINNAMON PO      Take 1,000 mg by mouth daily        clotrimazole 1 % cream    LOTRIMIN    15 g    Apply topically 2 times daily    Tinea corporis       cod liver oil Caps capsule      Take 1 capsule by mouth daily        digoxin 125 MCG tablet    LANOXIN    90 tablet    Take 1 tablet (125 mcg) by mouth daily    Atrial fibrillation (H)       fluticasone 50 MCG/ACT spray    FLONASE    3 Bottle    Spray 1-2 sprays into both nostrils daily    Nasal congestion       JANTOVEN 2.5 MG tablet   Generic drug:  warfarin     100  tablet    Take 1-2 tablets daily or as directed by INR clinic    Chronic atrial fibrillation (H)       latanoprost 0.005 % ophthalmic solution    XALATAN     Place 1 drop into both eyes At Bedtime.        Lidocaine 0.5 % Aero     170 g    Apply as needed prior to dressing changes    Non-healing wound of lower extremity, left, subsequent encounter       Lidocaine HCl 4 % Crea     63 g    Externally apply topically daily as needed Prior to dressing changes    Non-healing wound of lower extremity, left, subsequent encounter       lisinopril 5 MG tablet    PRINIVIL/ZESTRIL    90 tablet    Take 1 tablet (5 mg) by mouth daily    Type 2 diabetes mellitus with diabetic nephropathy, with long-term current use of insulin (H)       meclizine 25 MG tablet    ANTIVERT    270 tablet    TAKE 1 TABLET(25 MG) BY MOUTH THREE TIMES DAILY AS NEEDED FOR DIZZINESS    Vertigo       nitroGLYcerin 0.4 MG sublingual tablet    NITROSTAT    30 tablet    If chest pain place under tongue call 911 if pain not resolved/ repeat every 5 min for total of 3 doses    Congestive heart failure, unspecified congestive heart failure chronicity, unspecified congestive heart failure type (H)       NovoLIN MIX 70/30 VIAL injection   Generic drug:  insulin NPH-insulin regular     10 mL    INJECT 30 UNITS SUBCUTANEOUSLY TWICE DAILY WITH MEALS.    Type 2 diabetes mellitus with stage 3 chronic kidney disease (H)       nystatin 130980 UNIT/GM Powd    MYCOSTATIN    60 g    Apply topically 3 times daily as needed    Tinea corporis       oxybutynin 5 MG tablet    DITROPAN    90 tablet    Take 1 tablet (5 mg) by mouth daily    Urinary incontinence, unspecified type       pentoxifylline 400 MG CR tablet    TRENtal    270 tablet    TAKE 1 TABLET(400 MG) BY MOUTH THREE TIMES DAILY WITH MEALS    Peripheral vascular disease (H)       polyethylene glycol Packet    MIRALAX/GLYCOLAX     Take 1 packet by mouth 2 times daily as needed for constipation        potassium chloride  SA 20 MEQ CR tablet    K-DUR/KLOR-CON M    180 tablet    Take 1 tablet (20 mEq) by mouth 2 times daily    Diastolic dysfunction       torsemide 10 MG tablet    DEMADEX    180 tablet    Take 2 tablets (20 mg) by mouth At Bedtime    CHF (congestive heart failure) (H)       traMADol 50 MG tablet    ULTRAM    240 tablet    TAKE 1 TO 2 TABLETS BY MOUTH EVERY 6 HOURS AS NEEDED FOR PAIN    Chronic pain syndrome       Vitamin B 12 100 MCG Lozg     100 lozenge    Take 1,000 mcg by mouth daily    Vitamin B12 deficiency (non anemic)       vitamin D3 2000 UNITS Caps     90 capsule    Take 2,000 Units by mouth daily    Osteoporosis

## 2017-11-07 NOTE — MR AVS SNAPSHOT
After Visit Summary   11/7/2017    Malgorzata Lan    MRN: [id number]           Patient Information     Date Of Birth          1940        Visit Information        Provider Department      11/7/2017 2:15 PM Ed Shine MD [facility]        Today's Diagnoses     Cardiac pacemaker in situ        Cardiomyopathy, unspecified type (H)        Chronic atrial fibrillation (H)        Essential hypertension        Aortic valve disease        Mixed hyperlipidemia           Follow-ups after your visit        Additional Services     Follow-Up with Cardiologist                 Your next 10 appointments already scheduled     Nov 13, 2017  1:00 PM CST   Return Visit with Jayde Mckinney PA-C   [facility] [place] ([facility])    [address]  [address]  [place] [facility] [address] [phone]            Feb 07, 2018  2:00 PM CST   Remote PPM Check with BUCIO TECH1   [facility] ([facility])    [address]  [place] [facility] [id number] [phone]           This appointment is for a remote check of your pacemaker.  This is not an appointment at the office.              Future tests that were ordered for you today     Open Future Orders        Priority Expected Expires Ordered    Follow-Up with Cardiologist Routine 5/6/2018 11/7/2018 11/7/2017            If Something Goes Wrong     I will check my blood sugars twice a day     Notes - Note created  7/17/2014 10:25 AM by Jeannine Baker RN    As of today's date 7/17/2014 goal is met at 76 - 100%.   Goal Status:  Active        Who to contact     If you have questions or need follow up information about today's clinic visit or your schedule please contact [facility] directly at [phone].  Normal or non-critical lab and imaging results will be communicated to you by Flavia  "letter or phone within 4 business days after the clinic has received the results. If you do not hear from us within 7 days, please contact the clinic through NOBOT or phone. If you have a critical or abnormal lab result, we will notify you by phone as soon as possible.  Submit refill requests through NOBOT or call your pharmacy and they will forward the refill request to us. Please allow 3 business days for your refill to be completed.          Additional Information About Your Visit        NOBOT Information     NOBOT lets you send messages to your doctor, view your test results, renew your prescriptions, schedule appointments and more. To sign up, go to [email or web address] . Click on \"Log in\" on the left side of the screen, which will take you to the Welcome page. Then click on \"Sign up Now\" on the right side of the page.     You will be asked to enter the access code listed below, as well as some personal information. Please follow the directions to create your username and password.     Your access code is: [email or web address]  Expires: 2018  1:38 PM     Your access code will  in 90 days. If you need help or a new code, please call your [place] clinic or [phone].        Care EveryWhere ID     This is your Care EveryWhere ID. This could be used by other organizations to access your [place] medical records  [id number]        Your Vitals Were     Height Last Period BMI (Body Mass Index)             1.651 m (5' 5\") (LMP Unknown) 42.77 kg/m2          Blood Pressure from Last 3 Encounters:   17 110/60   10/30/17 120/74   10/30/17 110/76    Weight from Last 3 Encounters:   17 116.6 kg (257 lb)   10/30/17 118.8 kg (262 lb)   17 122.5 kg (270 lb)              We Performed the Following     Follow-Up with Cardiologist        Primary Care Provider Office Phone # Fax #    Clair Michelle [phone] [address]  [place] [address]        Goals     "    General    I will call my PCP right away if I have increased shortness of breath.   (pt-stated)     Notes - Note created  7/17/2014 10:27 AM by Jeannine Baker RN    As of today's date 7/17/2014 goal is met at 51 - 75%.   Goal Status:  Active      I will weigh myself daily and records results (pt-stated)     Notes - Note created  7/17/2014 10:25 AM by Jeannine Baker RN    As of today's date 7/17/2014 goal is met at 76 - 100%.   Goal Status:  Active        Equal Access to Services     [facility]: Hadii aad ku hadasho Soomaali, waaxda luqadaha, qaybta kaalmada adeegyada, waxnaldo flores . So [facility] [phone].    ATENCIÓN: Si habla español, tiene a bergeron disposición servicios gratuitos de asistencia lingüística. Llame al [phone].    We comply with applicable federal civil rights laws and [place] laws. We do not discriminate on the basis of race, color, national origin, age, disability, sex, sexual orientation, or gender identity.            Thank you!     Thank you for choosing [facility]  for your care. Our goal is always to provide you with excellent care. Hearing back from our patients is one way we can continue to improve our services. Please take a few minutes to complete the written survey that you may receive in the mail after your visit with us. Thank you!             Your Updated Medication List - Protect others around you: Learn how to safely use, store and throw away your medicines at www.disposemymeds.org.          This list is accurate as of: 11/7/17  3:06 PM.  Always use your most recent med list.                   Brand Name Dispense Instructions for use Diagnosis    acetaminophen 650 MG 8 hour tablet     100 tablet    Take 650 mg by mouth every 6 hours as needed for mild pain    S/P hysterectomy with oophorectomy       albuterol (5 MG/ML) 0.5% neb solution    PROVENTIL    60 vial    Take 0.5 mLs (2.5 mg) by nebulization every 4  hours as needed for wheezing or shortness of breath / dyspnea (dyspnea)    COPD exacerbation (H)       alendronate 70 MG tablet    FOSAMAX    12 tablet    Take 1 tablet (70 mg) by mouth every 7 days Swallow with a full glass of water an hour before breakfast and stay upright afterwards.    Osteopenia, unspecified location       alum & mag hydroxide-simethicone 400-400-40 MG/5ML Susp suspension    MYLANTA ES/MAALOX  ES     Take 15-30 mLs by mouth every 4 hours as needed for indigestion    S/P hysterectomy with oophorectomy       ASPIRIN NOT PRESCRIBED    INTENTIONAL    0 each    Antiplatelet medication not prescribed intentionally due to Current anticoagulant therapy (warfarin/enoxaparin)    Type 2 diabetes mellitus with diabetic nephropathy, with long-term current use of insulin (H)       atorvastatin 40 MG tablet    LIPITOR    90 tablet    TAKE 1 TABLET(40 MG) BY MOUTH AT BEDTIME    Mixed hyperlipidemia       betamethasone valerate 0.1 % ointment    VALISONE    15 g    Apply topically daily as needed To inside of ears.    Dermatitis       blood glucose monitoring test strip    no brand specified    3 Month    Use to test blood sugars 3 -4  times daily or as directed        budesonide 0.5 MG/2ML neb solution    PULMICORT    60 ampule    Take 2 mLs (0.5 mg) by nebulization 2 times daily as needed    COPD exacerbation (H)       carvedilol 6.25 MG tablet    COREG    180 tablet    Take 2 tablets (12.5 mg) by mouth 2 times daily (with meals)    Chronic atrial fibrillation (H), Congestive heart failure, unspecified congestive heart failure chronicity, unspecified congestive heart failure type (H)       CINNAMON PO      Take 1,000 mg by mouth daily        clotrimazole 1 % cream    LOTRIMIN    15 g    Apply topically 2 times daily    Tinea corporis       cod liver oil Caps capsule      Take 1 capsule by mouth daily        digoxin 125 MCG tablet    LANOXIN    90 tablet    Take 1 tablet (125 mcg) by mouth daily    Atrial  fibrillation (H)       fluticasone 50 MCG/ACT spray    FLONASE    3 Bottle    Spray 1-2 sprays into both nostrils daily    Nasal congestion       JANTOVEN 2.5 MG tablet   Generic drug:  warfarin     100 tablet    Take 1-2 tablets daily or as directed by INR clinic    Chronic atrial fibrillation (H)       latanoprost 0.005 % ophthalmic solution    XALATAN     Place 1 drop into both eyes At Bedtime.        Lidocaine 0.5 % Aero     170 g    Apply as needed prior to dressing changes    Non-healing wound of lower extremity, left, subsequent encounter       Lidocaine HCl 4 % Crea     63 g    Externally apply topically daily as needed Prior to dressing changes    Non-healing wound of lower extremity, left, subsequent encounter       lisinopril 5 MG tablet    PRINIVIL/ZESTRIL    90 tablet    Take 1 tablet (5 mg) by mouth daily    Type 2 diabetes mellitus with diabetic nephropathy, with long-term current use of insulin (H)       meclizine 25 MG tablet    ANTIVERT    270 tablet    TAKE 1 TABLET(25 MG) BY MOUTH THREE TIMES DAILY AS NEEDED FOR DIZZINESS    Vertigo       nitroGLYcerin 0.4 MG sublingual tablet    NITROSTAT    30 tablet    If chest pain place under tongue call 911 if pain not resolved/ repeat every 5 min for total of 3 doses    Congestive heart failure, unspecified congestive heart failure chronicity, unspecified congestive heart failure type (H)       NovoLIN MIX 70/30 VIAL injection   Generic drug:  insulin NPH-insulin regular     10 mL    INJECT 30 UNITS SUBCUTANEOUSLY TWICE DAILY WITH MEALS.    Type 2 diabetes mellitus with stage 3 chronic kidney disease (H)       nystatin [id number] UNIT/GM Powd    MYCOSTATIN    60 g    Apply topically 3 times daily as needed    Tinea corporis       oxybutynin 5 MG tablet    DITROPAN    90 tablet    Take 1 tablet (5 mg) by mouth daily    Urinary incontinence, unspecified type       pentoxifylline 400 MG CR tablet    TRENtal    270 tablet    TAKE 1 TABLET(400 MG) BY MOUTH THREE  TIMES DAILY WITH MEALS    Peripheral vascular disease (H)       polyethylene glycol Packet    MIRALAX/GLYCOLAX     Take 1 packet by mouth 2 times daily as needed for constipation        potassium chloride SA 20 MEQ CR tablet    K-DUR/KLOR-CON M    180 tablet    Take 1 tablet (20 mEq) by mouth 2 times daily    Diastolic dysfunction       torsemide 10 MG tablet    DEMADEX    180 tablet    Take 2 tablets (20 mg) by mouth At Bedtime    CHF (congestive heart failure) (H)       traMADol 50 MG tablet    ULTRAM    240 tablet    TAKE 1 TO 2 TABLETS BY MOUTH EVERY 6 HOURS AS NEEDED FOR PAIN    Chronic pain syndrome       Vitamin B 12 100 MCG Lozg     100 lozenge    Take 1,000 mcg by mouth daily    Vitamin B12 deficiency (non anemic)       vitamin D3 2000 UNITS Caps     90 capsule    Take 2,000 Units by mouth daily    Osteoporosis

## 2017-11-07 NOTE — PROGRESS NOTES
HPI and Plan:   See dictation        Orders Placed This Encounter   Procedures     Follow-Up with Cardiologist     No orders of the defined types were placed in this encounter.    There are no discontinued medications.      Encounter Diagnoses   Name Primary?     Cardiac pacemaker in situ      Cardiomyopathy, unspecified type (H)      Chronic atrial fibrillation (H)      Essential hypertension      Aortic valve disease      Mixed hyperlipidemia        CURRENT MEDICATIONS:  Current Outpatient Prescriptions   Medication Sig Dispense Refill     traMADol (ULTRAM) 50 MG tablet TAKE 1 TO 2 TABLETS BY MOUTH EVERY 6 HOURS AS NEEDED FOR PAIN 240 tablet 0     clotrimazole (LOTRIMIN) 1 % cream Apply topically 2 times daily 15 g 11     nystatin (MYCOSTATIN) 808420 UNIT/GM POWD Apply topically 3 times daily as needed 60 g 11     Lidocaine HCl 4 % CREA Externally apply topically daily as needed Prior to dressing changes 63 g 3     NOVOLIN MIX 70/30 VIAL (70-30) 100 UNIT/ML susp INJECT 30 UNITS SUBCUTANEOUSLY TWICE DAILY WITH MEALS. 10 mL 5     lisinopril (PRINIVIL/ZESTRIL) 5 MG tablet Take 1 tablet (5 mg) by mouth daily 90 tablet 3     pentoxifylline (TRENTAL) 400 MG CR tablet TAKE 1 TABLET(400 MG) BY MOUTH THREE TIMES DAILY WITH MEALS 270 tablet 3     potassium chloride SA (K-DUR/KLOR-CON M) 20 MEQ CR tablet Take 1 tablet (20 mEq) by mouth 2 times daily 180 tablet 3     meclizine (ANTIVERT) 25 MG tablet TAKE 1 TABLET(25 MG) BY MOUTH THREE TIMES DAILY AS NEEDED FOR DIZZINESS 270 tablet 3     alendronate (FOSAMAX) 70 MG tablet Take 1 tablet (70 mg) by mouth every 7 days Swallow with a full glass of water an hour before breakfast and stay upright afterwards. 12 tablet 3     fluticasone (FLONASE) 50 MCG/ACT spray Spray 1-2 sprays into both nostrils daily 3 Bottle 3     oxybutynin (DITROPAN) 5 MG tablet Take 1 tablet (5 mg) by mouth daily 90 tablet 3     JANTOVEN 2.5 MG tablet Take 1-2 tablets daily or as directed by INR clinic 100  tablet 3     Lidocaine 0.5 % AERO Apply as needed prior to dressing changes 170 g 1     carvedilol (COREG) 6.25 MG tablet Take 2 tablets (12.5 mg) by mouth 2 times daily (with meals) 180 tablet 3     digoxin (LANOXIN) 125 MCG tablet Take 1 tablet (125 mcg) by mouth daily 90 tablet 3     torsemide (DEMADEX) 10 MG tablet Take 2 tablets (20 mg) by mouth At Bedtime 180 tablet 3     albuterol (PROVENTIL) (5 MG/ML) 0.5% nebulizer solution Take 0.5 mLs (2.5 mg) by nebulization every 4 hours as needed for wheezing or shortness of breath / dyspnea (dyspnea) 60 vial 1     budesonide (PULMICORT) 0.5 MG/2ML nebulizer solution Take 2 mLs (0.5 mg) by nebulization 2 times daily as needed 60 ampule 1     Cyanocobalamin (VITAMIN B 12) 100 MCG LOZG Take 1,000 mcg by mouth daily 100 lozenge 1     Cholecalciferol (VITAMIN D3) 2000 UNITS CAPS Take 2,000 Units by mouth daily 90 capsule 1     nitroglycerin (NITROSTAT) 0.4 MG SL tablet If chest pain place under tongue call 911 if pain not resolved/ repeat every 5 min for total of 3 doses 30 tablet 3     acetaminophen 650 MG 8 hour tablet Take 650 mg by mouth every 6 hours as needed for mild pain 100 tablet      alum & mag hydroxide-simethicone (MYLANTA ES/MAALOX  ES) 400-400-40 MG/5ML SUSP Take 15-30 mLs by mouth every 4 hours as needed for indigestion  0     Cod Liver Oil CAPS Take 1 capsule by mouth daily       polyethylene glycol (MIRALAX/GLYCOLAX) packet Take 1 packet by mouth 2 times daily as needed for constipation        CINNAMON PO Take 1,000 mg by mouth daily       latanoprost (XALATAN) 0.005 % ophthalmic solution Place 1 drop into both eyes At Bedtime.       atorvastatin (LIPITOR) 40 MG tablet TAKE 1 TABLET(40 MG) BY MOUTH AT BEDTIME 90 tablet 3     betamethasone valerate (VALISONE) 0.1 % ointment Apply topically daily as needed To inside of ears. 15 g 2     blood glucose monitoring (NO BRAND SPECIFIED) test strip Use to test blood sugars 3 -4  times daily or as directed 3 Month  3     ASPIRIN NOT PRESCRIBED (INTENTIONAL) Antiplatelet medication not prescribed intentionally due to Current anticoagulant therapy (warfarin/enoxaparin) 0 each 0       ALLERGIES     Allergies   Allergen Reactions     Food Anaphylaxis     With green peppers     Codeine Sulfate Swelling     Throats swell     Flagyl [Metronidazole Hcl] Swelling     Hydrocodone Visual Disturbance     Pantoprazole Unknown     Penicillins Unknown     Hasn't had it for 30 years     Diltiazem Rash       PAST MEDICAL HISTORY:  Past Medical History:   Diagnosis Date     Aortic stenosis, moderate     on echo 3-14      Chronic leg pain      Congestive heart failure, unspecified     worsened during hospitalization/ diastolic failure  echo 3-2014 EF 55-60%/      COPD (chronic obstructive pulmonary disease) (H)      Depression      Diabetes mellitus (H)      Diastolic dysfunction      DJD (degenerative joint disease)      Glaucoma      Hyperlipidemia      Hyperparathyroidism (H)      Hypertension      Influenza A with pneumonia 3-2014    hospitalized      Lung cancer (H)      Morbid obesity (H)      Obstructive sleep apnea     wears CPAP     Pancytopenia (H)      Peripheral vascular disease (H)      Persistent atrial fibrillation (H) 06/08/2004     Pickwickian syndrome (H)      Renal insufficiency      Respiratory failure (H) 3-2014    hospitalized     Sinus node dysfunction (H)     TACHYBRADY SYNDROME s/p PPM     Squamous cell carcinoma of lung, stage I (H)      Syncope        PAST SURGICAL HISTORY:  Past Surgical History:   Procedure Laterality Date     APPLY WOUND VAC Right 8/4/2015    Procedure: APPLY WOUND VAC;  Surgeon: Yandel Fair MD;  Location:  [facility]     BONE MARROW BIOPSY, BONE SPECIMEN, NEEDLE/TROCAR  10/30/2013    Procedure: BIOPSY BONE MARROW;  BONE MARROW BIOPSY ;  Surgeon: Kimani Lobo MD;  Location:  GI     CHOLECYSTECTOMY       DAVINCI HYSTERECTOMY TOTAL, BILATERAL SALPINGO-OOPHORECTOMY, NODE DISSECTION,  COMBINED N/A 3/30/2016    Procedure: COMBINED DAVINCI HYSTERECTOMY TOTAL, SALPINGO-OOPHORECTOMY, NODE DISSECTION;  Surgeon: Kristin Arriaza MD;  Location: [facility] OR     DILATION AND CURETTAGE N/A 3/18/2016    Procedure: DILATION AND CURETTAGE;  Surgeon: Gilmer Cruz MD;  Location: [facility] OR     GRAFT SKIN SPLIT THICKNESS FROM EXTREMITY Right 8/4/2015    Procedure: GRAFT SKIN SPLIT THICKNESS FROM EXTREMITY;  Surgeon: Yandel Fair MD;  Location: [facility]     IMPLANT PACEMAKER  7/31/2013    Single chamber [place] [facility] PPM      IRRIGATION AND DEBRIDEMENT LOWER EXTREMITY, COMBINED Right 7/10/2015    Procedure: COMBINED IRRIGATION AND DEBRIDEMENT LOWER EXTREMITY;  Surgeon: Yandel Fair MD;  Location: [facility] OR     ORTHOPEDIC SURGERY      t oes     SINUS SURGERY       THORACOSCOPIC WEDGE RESECTION LUNG Right 3/17/2015    Procedure: THORACOSCOPIC WEDGE RESECTION LUNG;  Surgeon: Santos Crenshaw MD;  Location: [facility] OR     tonsils         FAMILY HISTORY:  Family History   Problem Relation Age of Onset     CANCER Mother 83     Lung cancer     GASTROINTESTINAL DISEASE Father 69     Ulcer bleed     HEART DISEASE Brother 58     CANCER Sister 60     Lung cancer     CANCER Sister 58     Lung cancer       SOCIAL HISTORY:  Social History     Social History     Marital status:      Spouse name: N/A     Number of children: N/A     Years of education: N/A     Social History Main Topics     Smoking status: Former Smoker     Packs/day: 1.00     Years: 30.00     Types: Cigarettes     Quit date: 8/14/1993     Smokeless tobacco: Never Used     Alcohol use 0.0 oz/week     0 Standard drinks or equivalent per week      Comment: 2 a week      Drug use: No     Sexual activity: Not Currently     Partners: Male     Other Topics Concern     Parent/Sibling W/ Cabg, Mi Or Angioplasty Before 65f 55m? No     Caffeine Concern Yes     3-4 cups of coffee     Special Diet No     Exercise No     Social History Narrative         _______        Malgorzata Lan  MRN# [id number]     YOB: 1940  Age: 75 year old         Date of Admission: 8/4/2015    Date of Discharge: 8/5/2015    Admitting Physician: Yandel Fair MD    Discharging Service: [facility] General Surgery     Primary Provider: Michelle Reed (General)        Discharge Diagnosis:     Principle Diagnosis:    right calf wound    Wound, open    Calf ulcer, right, with unspecified severity        Brief HPI: Malgorzata Lan is a 75 year old year-old female who presented to the [facility] for an elective split thickness skin grafting. She was being followed closely as an outpatient in wound clinic by Dr. Fair. She recently developed a significant ulceration in her right anterior lateral calf. Operative debridement was undertaken previously with good results with plans for eventual STSG which was undertaken 8/5/15 without complications.    Hospital Course: Malgorzata Lan underwent the above named procedure without complications. Please see op note for further details. The patient had a routine hospital course and recovered as anticipated. By POD #1, she had advanced to a regular diet and was transitioned successfully to oral pain medications. She was deemed ready for discharge on POD #1. She will follow-up in wound clinic on Monday 8/10/15 as previously scheduled for wound check.         Inpatient Consultations: No consultations were requested during this admission        Procedures:     Split thickness skin grafting        Labs/Imaging:     Results for orders placed or performed during the hospital encounter of 08/04/15 (from the past 24 hour(s))     Potassium     Result  Value  Ref Range      Potassium  4.2  3.4 - 5.3 mmol/L     INR     Result  Value  Ref Range      INR  2.84 (H)  0.86 - 1.14     Hemoglobin     Result  Value  Ref Range      Hemoglobin  9.0 (L)  11.7 - 15.7 g/dL     Glucose by meter     Result  Value  Ref Range      " Glucose  183 (H)  70 - 99 mg/dL     Glucose by meter     Result  Value  Ref Range      Glucose  154 (H)  70 - 99 mg/dL     Glucose by meter     Result  Value  Ref Range      Glucose  142 (H)  70 - 99 mg/dL     Glucose by meter     Result  Value  Ref Range      Glucose  126 (H)  70 - 99 mg/dL     Glucose by meter     Result  Value  Ref Range      Glucose  118 (H)  70 - 99 mg/dL     INR     Result  Value  Ref Range      INR  3.89 (H)  0.86 - 1.14             Disposition:     Discharged to home         Discharge Condition    Discharge condition:  Stable     Discharge vitals:  Blood pressure 112/59, pulse 83, temperature 97.8  F (36.6  C), temperature source Oral, resp. rate 16, height 1.651 m (5' 5\"), weight 134.718 kg (297 lb), SpO2 100 %, not currently breastfeeding.         Discharge Medications:     Current Discharge Medication List         START taking these medications      Details     !! oxyCODONE-acetaminophen (PERCOCET) 5-325 MG per tablet  Take 1-2 tablets by mouth every 6 hours as needed for moderate to severe pain    Qty: 20 tablet, Refills: 0      Associated Diagnoses: Acute post-operative pain         !! - Potential duplicate medications found. Please discuss with provider.         CONTINUE these medications which have NOT CHANGED      Details     !! oxyCODONE-acetaminophen (PERCOCET) 5-325 MG per tablet  Take 1 tablet by mouth every 6 hours as needed for moderate to severe pain    Qty: 120 tablet, Refills: 0      Associated Diagnoses: Chronic pain         insulin NPH-insulin regular (NOVOLIN MIX 70/30 VIAL) VIAL 100 UNITS/ML susp  20 units before breakfast 10 units before dinner    Qty: 3 Month, Refills: 3      Associated Diagnoses: Diabetes mellitus, type 2         pentoxifylline (TRENTAL) 400 MG CR tablet  Take 1 tablet (400 mg) by mouth 3 times daily (with meals) Office visit due for additional refills. Call [phone] to schedule.    Qty: 90 tablet, Refills: 0      Associated Diagnoses: Cramp of " limb         torsemide (DEMADEX) 10 MG tablet  Take 2 daily may on occasion double to 4 a day prn edema    Qty: 270 tablet, Refills: 1      Associated Diagnoses: Cardiomyopathy         fluticasone (FLONASE) 50 MCG/ACT nasal spray  Spray 1-2 sprays into both nostrils daily    Qty: 1 Package, Refills: 11      Associated Diagnoses: Right chronic serous otitis media         atorvastatin (LIPITOR) 40 MG tablet  Take 1 tablet (40 mg) by mouth daily Office visit with fasting labs due for additional refills. Call [phone] to schedule.    Qty: 30 tablet, Refills: 0      Associated Diagnoses: Other and unspecified hyperlipidemia         traMADol (ULTRAM) 50 MG tablet  Take 1 tablet (50 mg) by mouth every 6 hours as needed for moderate pain    Qty: 120 tablet, Refills: 3      Associated Diagnoses: Peripheral vascular disease         polyethylene glycol (MIRALAX/GLYCOLAX) packet  Take 1 packet by mouth daily as needed         lisinopril (PRINIVIL,ZESTRIL) 10 MG tablet  Take 0.5 tablets (5 mg) by mouth daily    Qty: 90 tablet, Refills: 3      Associated Diagnoses: Hypertension         potassium chloride SA (K-DUR,KLOR-CON M) 20 MEQ tablet  Take 1 tablet (20 mEq) by mouth 2 times daily    Qty: 180 tablet, Refills: 1      Associated Diagnoses: Hypertension; Congestive heart failure, unspecified         meclizine (ANTIVERT) 25 MG tablet  Take 1 tablet (25 mg) by mouth 4 times daily as needed    Qty: 360 tablet, Refills: 3      Associated Diagnoses: Dizzy         calcitonin, salmon, (MIACALCIN) 200 UNIT/ACT nasal spray  Spray 1 spray into one nostril alternating nostrils daily Alternate nostril each day.    Qty: 3 Bottle, Refills: 3      Associated Diagnoses: Osteoporosis, unspecified         oxybutynin (DITROPAN) 5 MG tablet  TAKE 1 TABLET BY MOUTH TWICE DAILY    Qty: 180 tablet, Refills: 3      Associated Diagnoses: Unspecified urinary incontinence         carvedilol (COREG) 6.25 MG tablet  Take 1 tablet (6.25 mg) by mouth 2  times daily (with meals)    Qty: 60 tablet, Refills: 1      Associated Diagnoses: Cardiomyopathy         betamethasone valerate (VALISONE) 0.1 % ointment  Apply topically 2 times daily    Qty: 15 g, Refills: 0         Cholecalciferol (VITAMIN D3 PO)  Take 2,000 Units by mouth daily         budesonide (PULMICORT) 0.5 MG/2ML nebulizer solution  Take 2 mLs (0.5 mg) by nebulization 2 times daily    Qty: 3 Box, Refills: 1      Associated Diagnoses: COPD exacerbation         Cyanocobalamin (VITAMIN B 12 PO)  Take 1,000 mcg by mouth daily         CINNAMON PO  Take 1,000 mg by mouth daily         latanoprost (XALATAN) 0.005 % ophthalmic solution  Place 1 drop into both eyes At Bedtime.         WARFARIN SODIUM PO  Take 5 mg by mouth daily         collagenase ointment  Apply topically daily    Qty: 90 g, Refills: 3      Associated Diagnoses: Open wound         lidocaine (XYLOCAINE) 2 % jelly  Apply topically as needed for moderate pain    Qty: 85 mL, Refills: 3      Associated Diagnoses: Open wound         ipratropium - albuterol 0.5 mg/2.5 mg/3 mL (DUONEB) 0.5-2.5 (3) MG/3ML nebulization  Take 1 vial (3 mLs) by nebulization 4 times daily    Qty: 360 mL, Refills: 5      Associated Diagnoses: COPD exacerbation         albuterol (PROVENTIL) (5 MG/ML) 0.5% nebulizer solution  Take 0.5 mLs (2.5 mg) by nebulization every 4 hours as needed for wheezing or shortness of breath / dyspnea (dyspnea)    Qty: 60 vial      Associated Diagnoses: COPD exacerbation         Nitroglycerin (NITROSTAT SL)  Place 0.4 mg under the tongue Repeat in 5 min x 2 if not gone.         !! - Potential duplicate medications found. Please discuss with provider.                 Discharge Instructions:     Follow-up Appointments     Follow-up and recommended labs and tests     Follow up with Dr. Fair , at (location with clinic name or city)     [facility] wound [facility], as previously scheduled. No follow up labs or test     are needed.     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 The following problems were addressed during her hospitalization:    RLE Non-healing wound    long-standing right lower extremity wound, nonhealing, with purulent debris.     Previously biopsied to rule out cancer.     X-ray of the lower extremity as well as an ultrasound of the arterial flow was completed without acute abnormality per report.     Treated with ABX initially with Vanco and Ertapenem.     Seen by Surgery Dr. Fair and taken for debridement of the wound on 7/10.     Also seen by ID. Symptoms improved with regards to pain s/p debridement.     Wound cultures + for E.Coli, Pseudomonas, GNR and enterococcus. BC 1 of 2 on 7/8 + for Strep viridans. Repeats on 7/9 and 7/10 NGTD.     Suspect contaminant.     Antibiotics not continued on discharge as debridement was felt to be sufficient.     To follow up with Dr. Fair in the [facility] to have a wound vac placed for 1 week and then to undergo a skin graft. Continue with Aquacel for wound.         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 To Follow with [facility].         H/o Chronic atrial fibrillation/CHF/HTN/HLD:     The patient has a pacemaker. Last echocardiogram from 6/2014 was 55% to 60%. She currently demonstrates no signs of volume overload, but does have some chronic lower extremity edema. Fluids discontinued, but intake remains below baseline with borderline BP. Continue with prior to admission lisinopril, Torsemide and Coreg. Warfarin held for procedure but resumed post op. INR was 1.43 on d/c. Bridged with Lovenox gien  her CHADS2 of 4.         Insulin-dependent diabetes mellitus:     Recent hemoglobin A1c is 6.4%. Home regimen includes Novolin 70/30 insulin 30 units with breakfast and 20-26 units with dinner. Was on 15 units due to decreased po intake and increased to 20 Q am 7/11. C/w 10 units at dinner. This is the dose she was discharged on. To adjust back to her pta dose as needed.         Chronic kidney disease, stage 3:     Baseline creatinine is 1.1-1.3. Currently at her baseline.         Pancytopenia:     This is chronic. She also has a history of MGUS with a negative bone marrow biopsy in 2013. Iron studies, b12, folate, retic, ps completed.             Consultations This Hospital Stay     SOCIAL WORK IP CONSULT    VASCULAR SURGERY IP CONSULT    INFECTIOUS DISEASES IP CONSULT        INR     Home RN to draw and send to PCP/INR Clinic.     Home care nursing referral     Home care PT referral     Home care OT referral     Follow Up and recommended labs and tests     Follow up with primary care provider, Michelle Reed within 14 days for hospital follow- up. No follow up labs or test are needed.    Follow up with Dr. Fair at the [facility] for continued RLE wound care. Plan is for a wound vac x 1 week to be applied in clinic followed by a skin graft.     Discharge Instructions     Continue with Lovenox BID until INR 2-3.    Adjust Lantus based on Finger sticks when you return home. You were on 30 units in the morning and 20 Units at night prior to coming to the hospital. I have reduced this to 20 units in the morning and 10 units at night due to you not requiring as much insulin here.     MD face to face encounter     Nurse is needed for complex aftercare of surgical procedures because the patient needs instruction and cannot perform care on their own due to location of wound and steps involved.     Physical therapy services are needed to assess and treat the following functional impairments: Deconditioning .      "Occupational therapy services are needed to assess and treat cognitive ability and address ADL safety due to impairment in Ambulation and deconditioning.                Results for orders placed or performed during the hospital encounter of 07/08/15     US Lower Extremity Arterial Duplex Right      Narrative           Impression      IMPRESSION:    1. Patent right lower extremity arterial vessels without evidence of    significant stenotic or occlusive disease.    2. Right popliteal cyst.          Impression: Deep venous insufficiency in the right common femoral vein    and superficial venous insufficiency in the right greater saphenous    vein as described above.        HAROON VANG MD                  Review of Systems:  Skin:  Negative       Eyes:  Positive for glasses    ENT:  Negative      Respiratory:  Negative       Cardiovascular:    Positive for;edema;lightheadedness    Gastroenterology: Negative      Genitourinary:  not assessed      Musculoskeletal:  Positive for arthritis;joint pain;joint stiffness;muscular weakness    Neurologic:  Positive for numbness or tingling of feet    Psychiatric:  Negative      Heme/Lymph/Imm:  Positive for allergies    Endocrine:  Positive for diabetes      Physical Exam:  Vitals: /60  Ht 1.651 m (5' 5\")  Wt 116.6 kg (257 lb)  LMP  (LMP Unknown)  BMI 42.77 kg/m2    Constitutional:           Skin:             Head:           Eyes:           Lymph:      ENT:           Neck:           Respiratory:            Cardiac:                                                           GI:           Extremities and Muscular Skeletal:                 Neurological:           Psych:           Recent Lab Results:  LIPID RESULTS:  Lab Results   Component Value Date    CHOL 105 06/08/2016    HDL 44 (L) 06/08/2016    LDL 37 06/08/2016    TRIG 122 06/08/2016    CHOLHDLRATIO 3.2 03/13/2014       LIVER ENZYME RESULTS:  Lab Results   Component Value Date    AST 19 04/11/2017    ALT 13 " 04/11/2017       CBC RESULTS:  Lab Results   Component Value Date    WBC 3.0 (L) 04/11/2017    RBC 3.46 (L) 04/11/2017    HGB 10.9 (L) 04/11/2017    HCT 33.4 (L) 04/11/2017    MCV 97 04/11/2017    MCH 31.5 04/11/2017    MCHC 32.6 04/11/2017    RDW 14.7 04/11/2017    PLT 96 (L) 04/11/2017       BMP RESULTS:  Lab Results   Component Value Date     04/11/2017    POTASSIUM 4.1 04/13/2017    CHLORIDE 105 04/11/2017    CO2 26 04/11/2017    ANIONGAP 8 04/11/2017     (H) 04/13/2017     (H) 04/13/2017    BUN 14 04/11/2017    CR 1.26 (H) 04/13/2017    GFRESTIMATED 41 (L) 04/13/2017    GFRESTBLACK 48 (L) 04/13/2017    ANNALISA 9.8 04/11/2017        A1C RESULTS:  Lab Results   Component Value Date    A1C 6.6 (H) 04/13/2017       INR RESULTS:  Lab Results   Component Value Date    INR 1.6 10/31/2017    INR 1.8 10/24/2017           CC  Ed Shine MD  [id number]  [place] [phone]

## 2017-11-08 ENCOUNTER — TELEPHONE (OUTPATIENT)
Dept: FAMILY MEDICINE | Facility: CLINIC | Age: 77
End: 2017-11-08

## 2017-11-08 ENCOUNTER — ANTICOAGULATION THERAPY VISIT (OUTPATIENT)
Dept: FAMILY MEDICINE | Facility: CLINIC | Age: 77
End: 2017-11-08
Payer: MEDICARE

## 2017-11-08 DIAGNOSIS — I48.91 ATRIAL FIBRILLATION (H): ICD-10-CM

## 2017-11-08 DIAGNOSIS — Z79.01 LONG-TERM (CURRENT) USE OF ANTICOAGULANTS: ICD-10-CM

## 2017-11-08 LAB — INR PPP: 1.4

## 2017-11-08 PROCEDURE — 99207 ZZC NO CHARGE NURSE ONLY: CPT | Performed by: INTERNAL MEDICINE

## 2017-11-08 NOTE — TELEPHONE ENCOUNTER
See anticoagulation therapy encounter. Waiting call back to make sure patient received dosing instructions. (Increase)  Advised 10 mg today. Then begin 7.5 mg SaTu, 5 mg ROW.  Recheck 6 days.

## 2017-11-08 NOTE — MR AVS SNAPSHOT
Malgorzata Lan   11/8/2017   Anticoagulation Therapy Visit    Description:  77 year old female   Provider:  Clair Michelle DO   Department:  [facility]           INR as of 11/8/2017     Today's INR 1.4!      Anticoagulation Summary as of 11/8/2017     INR goal 2.0-3.0   Today's INR 1.4!   Full instructions 11/8: 10 mg; 11/11: 7.5 mg; 11/14: 7.5 mg; Otherwise 7.5 mg on Tue, Sat; 5 mg all other days   Next INR check 11/15/2017    Indications   Long-term (current) use of anticoagulants [Z79.01] [Z79.01]  Atrial fibrillation (H) [I48.91]         November 2017 Details    Sun Mon Tue Wed Thu Fri Sat        1               2               3               4                 5               6               7               8      10 mg   See details      9      5 mg         10      5 mg         11      7.5 mg           12      5 mg         13      5 mg         14      7.5 mg         15            16               17               18                 19               20               21               22               23               24               25                 26               27               28               29               30                  Date Details   11/08 This INR check       Date of next INR:  11/15/2017         How to take your warfarin dose     To take:  5 mg Take 2 of the 2.5 mg tablets.    To take:  7.5 mg Take 3 of the 2.5 mg tablets.    To take:  10 mg Take 4 of the 2.5 mg tablets.

## 2017-11-08 NOTE — TELEPHONE ENCOUNTER
Reason for Call:  INR    Who is calling?  Home [facility]: [facility]    Phone number:  [phone]    Fax number:      Name of caller:     INR Value:  1.4    Are there any other concerns:  No    Phone number patient can be reached at: Home number on file [phone] (home)      Call taken on 11/8/2017 at 3:30 PM by Julita Ansari    .

## 2017-11-08 NOTE — TELEPHONE ENCOUNTER
Reason for Call:  INR    Who is calling?  Patient     Phone number:  [phone]    Fax number:  None    Name of caller: Malgorzata    INR Value:  1.4    Are there any other concerns:  Yes: Dosing    Can we leave a detailed message on this number? YES    Phone number patient can be reached at: Home number on file [phone] (home)      Call taken on 11/8/2017 at 11:04 AM by Rama Dumont

## 2017-11-08 NOTE — PROGRESS NOTES
HISTORY OF PRESENT ILLNESS:  I saw Malgorzata today, who at age 77 is wheelchair bound.  She comes very nicely dressed.  She is overweight, of course, although her weight is down from 286 to 257 pounds in the last 6 months.  She says she has been eating less and just trying to lose weight.      She denies chest pain, palpitations, shortness breath, dizziness or syncope.  She is, of course, short of breath if she pushes herself, but she does not do much in the way of walking anymore.        She is in the middle of rediscovering an old friend that she dated in high school whose wife passed away.  In concert Malgorzata's  passed away.  They have rekindled an old friendship, and it is quite exciting for her.      CURRENT MEDICATIONS:  Listed in Epic and include:   1.  Lisinopril 5 mg a day.   2.  Potassium 20 mEq b.i.d.   3.  Atorvastatin 40 mg at bedtime.   4.  Demadex 20 mg a day.   5.  Lanoxin 0.125 mg a day.   6.  Carvedilol 6.25 mg twice a day.   7.  Jantoven 2.5 mg as directed for an INR of 2.0-3.0.   8.  Ditropan an 5 mg a day.   She also takes Flonase, Fosamax, Antivert, Trental, Ultram, among others.      PHYSICAL EXAMINATION:    VITAL SIGNS:  Physical exam revealed that she was asymptomatic with a blood pressure 110/60, heart rate was 70 beats a minute.   HEAD AND NECK:  She had no neck vein distention or bruits at 90 degrees.   HEART:  Heart tones were distant and a bit regular.  There was a 1/6 systolic ejection murmur in the aortic area that radiated poorly.   LUNGS:  Clear.   ABDOMEN:  Soft, pendulous, nontender.   EXTREMITIES:  Showed bilateral +1 edema with chronic venous stasis changes.     SKIN:  She has a pacemaker that is well-healed in the left subclavian space.      Following up her pacemaker, she has a single-lead in the right ventricle.  She paces in the ventricle 22% of the time.  She occasionally has heart rates of her own greater than 100 beats per minute.  She has had occasional nonsustained VT,  but as best I can tell, no dizziness or symptoms with that.  The longest seem to run around 44 seconds.      INRs have generally been a little subtherapeutic.  From a cardiac standpoint a year ago, her heart showed an EF of 50%-55%.  The left atrium was moderately dilated.  There was +1 aortic insufficiency, mild aortic stenosis, no evidence of pulmonary hypertension or right heart failure, and this fits her clinical picture now.      IMPRESSION:   1.  Mild aortic stenosis and mild aortic insufficiency.   2.  LV function lower limits of normal without overt signs of left heart failure.   3.  Chronic atrial fibrillation.   4.  Asymptomatic occasional nonsustained VT.   5.  Treated hypertension.   6.  Treated hyperlipidemia.   7.  Chronically overweight.   8.  Diastolic heart failure.   9.  Chronic renal insufficiency.   10.  VVIR pacemaker functioning properly.      PLAN:  I made no changes.  I did not check any lab work.  I discussed her multiple cardiac issues with her.  She was pleased with her stability, and so was I.  She is really looking forward to this relationship with her old friend.      cc:   Clair Michelle,     [address], [address]    [place] [address]         SIMEON MARSH MD, FACC             D: 2017 15:37   T: 2017 19:32   MT: RICHI      Name:     HEATHER PRINCE   MRN:      [id number]        Account:      [id number]   :      1940           Service Date: 2017      Document: [id number]

## 2017-11-08 NOTE — TELEPHONE ENCOUNTER
Reason for Call:  Other returning call    Detailed comments: patient is returning call.     Phone Number Patient can be reached at: Home number on file [phone] (home)    Best Time: any    Can we leave a detailed message on this number? YES    Call taken on 11/8/2017 at 12:58 PM by Angie Anaya

## 2017-11-08 NOTE — PROGRESS NOTES
"  ANTICOAGULATION FOLLOW-UP CLINIC VISIT    Patient Name:  Malgorzata Lan  Date:  11/8/2017  Contact Type:  Telephone    SUBJECTIVE:        OBJECTIVE    INR   Date Value Ref Range Status   11/08/2017 1.4  Final       ASSESSMENT / PLAN  INR assessment SUB    Recheck INR In: 1 WEEK    INR Location Home INR      Anticoagulation Summary as of 11/8/2017     INR goal 2.0-3.0   Today's INR 1.4!   Maintenance plan 7.5 mg (2.5 mg x 3) on Tue, Sat; 5 mg (2.5 mg x 2) all other days   Full instructions 11/8: 10 mg; 11/11: 7.5 mg; 11/14: 7.5 mg; Otherwise 7.5 mg on Tue, Sat; 5 mg all other days   Weekly total 40 mg   Plan last modified Marifer Zaragoza, RN (11/8/2017)   Next INR check 11/15/2017   Priority INR   Target end date     Indications   Long-term (current) use of anticoagulants [Z79.01] [Z79.01]  Atrial fibrillation (H) [I48.91]         Anticoagulation Episode Summary     INR check location Home Draw    Preferred lab     Send INR reminders to  ANTICOAGULATION    Comments [facility] Home INR      Anticoagulation Care Providers     Provider Role Specialty Phone number    Derek Michelle Crow MD [facility] [phone]            See the Encounter Report to view Anticoagulation Flowsheet and Dosing Calendar (Go to Encounters tab in chart review, and find the Anticoagulation Therapy Visit)    Dosage adjustment made based on physician directed care plan. INR result from [facility]: 1.4. She has not missed any doses. Denies any \"dietary supplements, vitamins,other\".   Advised 10 mg today, then begin 7.5 mg SaTu,5 mg ROW. Recheck 6 days.    Marifer Zaragoza, RN               "

## 2017-11-14 ENCOUNTER — TELEPHONE (OUTPATIENT)
Dept: FAMILY MEDICINE | Facility: CLINIC | Age: 77
End: 2017-11-14

## 2017-11-14 ENCOUNTER — ANTICOAGULATION THERAPY VISIT (OUTPATIENT)
Dept: FAMILY MEDICINE | Facility: CLINIC | Age: 77
End: 2017-11-14
Payer: MEDICARE

## 2017-11-14 DIAGNOSIS — Z79.01 LONG-TERM (CURRENT) USE OF ANTICOAGULANTS: ICD-10-CM

## 2017-11-14 DIAGNOSIS — I48.0 PAROXYSMAL ATRIAL FIBRILLATION (H): ICD-10-CM

## 2017-11-14 LAB — INR PPP: 2.6

## 2017-11-14 PROCEDURE — 99207 ZZC NO CHARGE NURSE ONLY: CPT | Performed by: INTERNAL MEDICINE

## 2017-11-14 NOTE — PROGRESS NOTES
ANTICOAGULATION FOLLOW-UP CLINIC VISIT    Patient Name:  Malgorzata Lan  Date:  11/14/2017  Contact Type:  Telephone/ Patient - [facility] home monitoring    SUBJECTIVE:        OBJECTIVE    INR   Date Value Ref Range Status   11/14/2017 2.6  Final       ASSESSMENT / PLAN  INR assessment THER    Recheck INR In: 1 WEEK    INR Location Home INR      Anticoagulation Summary as of 11/14/2017     INR goal 2.0-3.0   Today's INR 2.6   Maintenance plan 7.5 mg (2.5 mg x 3) on Tue, Sat; 5 mg (2.5 mg x 2) all other days   Full instructions 11/14: 7.5 mg; Otherwise 7.5 mg on Tue, Sat; 5 mg all other days   Weekly total 40 mg   No change documented Bharati Saini RN   Plan last modified Marifer Zaragoza RN (11/8/2017)   Next INR check 11/21/2017   Priority INR   Target end date     Indications   Long-term (current) use of anticoagulants [Z79.01] [Z79.01]  Atrial fibrillation (H) [I48.91]         Anticoagulation Episode Summary     INR check location Home Draw    Preferred lab     Send INR reminders to  ANTICOAGULATION    Comments [facility] Home INR      Anticoagulation Care Providers     Provider Role Specialty Phone number    Derek Michelle Crow MD [facility] [phone]            See the Encounter Report to view Anticoagulation Flowsheet and Dosing Calendar (Go to Encounters tab in chart review, and find the Anticoagulation Therapy Visit)    Malgorzata confirms and agrees with dosing instructions as noted above.  Dosing based on [facility] Protocol and Provider directed care plan.      Bharati Saini RN

## 2017-11-14 NOTE — MR AVS SNAPSHOT
Malgorzata Lan   11/14/2017   Anticoagulation Therapy Visit    Description:  77 year old female   Provider:  Clair Michelle DO   Department:  [facility]           INR as of 11/14/2017     Today's INR 2.6      Anticoagulation Summary as of 11/14/2017     INR goal 2.0-3.0   Today's INR 2.6   Full instructions 11/14: 7.5 mg; Otherwise 7.5 mg on Tue, Sat; 5 mg all other days   Next INR check 11/21/2017    Indications   Long-term (current) use of anticoagulants [Z79.01] [Z79.01]  Atrial fibrillation (H) [I48.91]         Description     INR     Who is calling?  Patient     Phone number:  [phone]     Name of caller: Malgorzata     INR Value:  2.6     Are there any other concerns:  No      November 2017 Details    Sun Mon Tue Wed Thu Fri Sat        1               2               3               4                 5               6               7               8               9               10               11                 12               13               14      7.5 mg   See details      15      5 mg         16      5 mg         17      5 mg         18      7.5 mg           19      5 mg         20      5 mg         21            22               23               24               25                 26               27               28               29               30                  Date Details   11/14 This INR check       Date of next INR:  11/21/2017         How to take your warfarin dose     To take:  5 mg Take 2 of the 2.5 mg tablets.    To take:  7.5 mg Take 3 of the 2.5 mg tablets.

## 2017-11-14 NOTE — TELEPHONE ENCOUNTER
Reason for Call:  INR    Who is calling?  Patient    Phone number:  [phone]    Name of caller: Malgorzata    INR Value:  2.6    Are there any other concerns:  No    Can we leave a detailed message on this number? YES      Call taken on 11/14/2017 at 1:47 PM by Cecil Macedo

## 2017-11-17 ENCOUNTER — TELEPHONE (OUTPATIENT)
Dept: FAMILY MEDICINE | Facility: CLINIC | Age: 77
End: 2017-11-17

## 2017-11-17 NOTE — TELEPHONE ENCOUNTER
Reason for Call:  Home Health Care    Yosi with [facility] called regarding (reason for call): Orders    Orders are needed for this patient.     Skilled Nursing: 3x a week for 8 weeks   2x a week for 1 week   3 as needed     Home health aide: 2x a for 8 weeks  1x a week for 1 week     Pt Provider: Dr. Michelle    Phone Number Homecare Nurse can be reached at: Ph. [phone]    Can we leave a detailed message on this number? YES    all taken on 11/17/2017 at 2:26 PM by Vanessa Núñez

## 2017-11-20 ENCOUNTER — HOSPITAL ENCOUNTER (OUTPATIENT)
Dept: WOUND CARE | Facility: CLINIC | Age: 77
Discharge: HOME | End: 2017-11-20
Attending: PHYSICIAN ASSISTANT | Admitting: PHYSICIAN ASSISTANT
Payer: MEDICARE

## 2017-11-20 PROCEDURE — 11042 DBRDMT SUBQ TIS 1ST 20SQCM/<: CPT | Performed by: PHYSICIAN ASSISTANT

## 2017-11-20 PROCEDURE — 11045 DBRDMT SUBQ TISS EACH ADDL: CPT | Performed by: PHYSICIAN ASSISTANT

## 2017-11-20 PROCEDURE — A6021 COLLAGEN DRESSING <=16 SQ IN: HCPCS

## 2017-11-20 PROCEDURE — A6210 FOAM DRG >16<=48 SQ IN W/O B: HCPCS

## 2017-11-20 NOTE — PROGRESS NOTES
[facility]    HISTORY OF PRESENT ILLNESS: Ms. Malgorzata Lan is a 77-year-old female who returns to the [facility] today to follow-up on a left lower leg venous ulceration. She has been followed by Dr. Fair as well as myself. Her wound continues to improve. She has seen progress with more frequent debridements and tighter compression.      INTERVAL HISTORY: Last visit we added vinegar soaks with dressing changes. She continues to improve.     WOUND CARE: Iodofoam covered with an ABD and SpandaGrip E changed 3x a week.     VITALS: T 97.8, /72, P 59, R 18    PHYSICAL EXAM:  GENERAL: Patient is alert and oriented and in no acute distress  INTEGUMENTARY:   WOUND ASSESSMENT:     Location: left anterior lower leg     Stage/Thickness: Full    Size: 5.5 cm x 6.5 cm with a depth of 0.1 cm    Drainage: copious amount of serosanguinous drainage    Wound description: primarily granular with focal areas of necrotic tissue    PROCEDURE: Per standing orders, topical 4% lidocaine was applied to the wound by the [facility]. Timeout was called and informed consent was obtained. Using a sharp curette a surgical debridement was performed down to and including subcutaneous tissue of more than 20 but less than 40 cm. Hemostasis was achieved with pressure. The patient tolerated the procedure well.     ASSESSMENT: improving venous ulcerations of left lower leg with fat layer exposed    PLAN:   1. Primary dressing: Endoform; Secondary dressing: Iodofoam, ABD pad, SpandaGrip E  2. Can discontinue vinegar soaks    FOLLOW-UP: 2 weeks    INMCO REDD PA-C

## 2017-11-21 ENCOUNTER — TELEPHONE (OUTPATIENT)
Dept: FAMILY MEDICINE | Facility: CLINIC | Age: 77
End: 2017-11-21

## 2017-11-21 LAB — INR PPP: 3.7

## 2017-11-21 NOTE — TELEPHONE ENCOUNTER
Reason for Call:  INR    Who is calling?  The patient     Phone number:  [phone]    Fax number:  None    Name of caller: Malgorzata    INR Value:  3.7    Are there any other concerns:  Yes: Needs dosing for tonight    Can we leave a detailed message on this number? YES    Phone number patient can be reached at: Home number on file [phone] (home)      Call taken on 11/21/2017 at 4:56 PM by Rama Dumont

## 2017-11-22 ENCOUNTER — ANTICOAGULATION THERAPY VISIT (OUTPATIENT)
Dept: FAMILY MEDICINE | Facility: CLINIC | Age: 77
End: 2017-11-22
Payer: MEDICARE

## 2017-11-22 DIAGNOSIS — I48.0 PAROXYSMAL ATRIAL FIBRILLATION (H): ICD-10-CM

## 2017-11-22 DIAGNOSIS — Z79.01 LONG-TERM (CURRENT) USE OF ANTICOAGULANTS: ICD-10-CM

## 2017-11-22 PROCEDURE — 99207 ZZC NO CHARGE NURSE ONLY: CPT | Performed by: INTERNAL MEDICINE

## 2017-11-22 NOTE — PROGRESS NOTES
ANTICOAGULATION FOLLOW-UP CLINIC VISIT    Patient Name:  Malgorzata Lan  Date:  11/22/2017  Contact Type:  Telephone    SUBJECTIVE:     Patient Findings     Positives Other complaints (Has a wound on her (L) leg - sees wound clinic - had wound debrided Monday 11/20)           OBJECTIVE    INR   Date Value Ref Range Status   11/21/2017 3.7  Corrected       ASSESSMENT / PLAN  INR assessment SUPRA    Recheck INR In: 1 WEEK    INR Location Home INR      Anticoagulation Summary as of 11/22/2017     INR goal 2.0-3.0   Today's INR 3.7! (11/21/2017)   Maintenance plan 7.5 mg (2.5 mg x 3) on Tue, Sat; 5 mg (2.5 mg x 2) all other days   Full instructions 11/22: 2.5 mg; Otherwise 7.5 mg on Tue, Sat; 5 mg all other days   Weekly total 40 mg   Plan last modified Marfier Zaragoza RN (11/8/2017)   Next INR check 11/28/2017   Priority INR   Target end date     Indications   Long-term (current) use of anticoagulants [Z79.01] [Z79.01]  Atrial fibrillation (H) [I48.91]         Anticoagulation Episode Summary     INR check location Home Draw    Preferred lab     Send INR reminders to [facility] ANTICOAGULATION    Comments [facility] Home INR      Anticoagulation Care Providers     Provider Role Specialty Phone number    Reed, Michelle Crow MD [facility] [phone]            See the Encounter Report to view Anticoagulation Flowsheet and Dosing Calendar (Go to Encounters tab in chart review, and find the Anticoagulation Therapy Visit)    Dosage adjustment made based on physician directed care plan.  Patient states she has a wound on her (L) leg that was debrided 11/20/2017   Denies s/s of infection - nurse is going to change dressing today at her home  INR done yesterday - told pt to adjust her dose for tonight (2.5mg instead of 5mg ~6% decrease) and have a small salad as well - patient states she likes cynthia Reyes RN

## 2017-11-22 NOTE — MR AVS SNAPSHOT
Malgorzata Lan   11/22/2017   Anticoagulation Therapy Visit    Description:  77 year old female   Provider:  Clair Michelle DO   Department:  [facility]           INR as of 11/22/2017     Today's INR 3.7! (11/21/2017)      Anticoagulation Summary as of 11/22/2017     INR goal 2.0-3.0   Today's INR 3.7! (11/21/2017)   Full instructions 11/22: 2.5 mg; Otherwise 7.5 mg on Tue, Sat; 5 mg all other days   Next INR check 11/28/2017    Indications   Long-term (current) use of anticoagulants [Z79.01] [Z79.01]  Atrial fibrillation (H) [I48.91]         November 2017 Details    Sun Mon Tue Wed Thu Fri Sat        1               2               3               4                 5               6               7               8               9               10               11                 12               13               14               15               16               17               18                 19               20               21               22      2.5 mg   See details      23      5 mg         24      5 mg         25      7.5 mg           26      5 mg         27      5 mg         28            29               30                  Date Details   11/22 This INR check       Date of next INR:  11/28/2017         How to take your warfarin dose     To take:  2.5 mg Take 1 of the 2.5 mg tablets.    To take:  5 mg Take 2 of the 2.5 mg tablets.    To take:  7.5 mg Take 3 of the 2.5 mg tablets.

## 2017-11-26 DIAGNOSIS — I50.9 CONGESTIVE HEART FAILURE, UNSPECIFIED CONGESTIVE HEART FAILURE CHRONICITY, UNSPECIFIED CONGESTIVE HEART FAILURE TYPE: ICD-10-CM

## 2017-11-28 RX ORDER — POTASSIUM CHLORIDE 1500 MG/1
TABLET, EXTENDED RELEASE ORAL
Qty: 180 TABLET | Refills: 0 | Status: SHIPPED | OUTPATIENT
Start: 2017-11-28

## 2017-11-28 NOTE — TELEPHONE ENCOUNTER
Prescription approved per [facility] Refill Protocol.    Martine Hernandez RN   [facility]

## 2017-11-30 ENCOUNTER — ANTICOAGULATION THERAPY VISIT (OUTPATIENT)
Dept: FAMILY MEDICINE | Facility: CLINIC | Age: 77
End: 2017-11-30
Payer: MEDICARE

## 2017-11-30 ENCOUNTER — TELEPHONE (OUTPATIENT)
Dept: FAMILY MEDICINE | Facility: CLINIC | Age: 77
End: 2017-11-30

## 2017-11-30 DIAGNOSIS — Z79.01 LONG-TERM (CURRENT) USE OF ANTICOAGULANTS: ICD-10-CM

## 2017-11-30 DIAGNOSIS — I48.91 ATRIAL FIBRILLATION (H): ICD-10-CM

## 2017-11-30 LAB — INR PPP: 2.1

## 2017-11-30 PROCEDURE — 99207 ZZC NO CHARGE NURSE ONLY: CPT | Performed by: INTERNAL MEDICINE

## 2017-11-30 NOTE — MR AVS SNAPSHOT
Malgorzata Lan   11/30/2017   Anticoagulation Therapy Visit    Description:  77 year old female   Provider:  Clair Michelle DO   Department:  [facility]           INR as of 11/30/2017     Today's INR 2.1      Anticoagulation Summary as of 11/30/2017     INR goal 2.0-3.0   Today's INR 2.1   Full instructions 7.5 mg on Tue, Sat; 5 mg all other days   Next INR check 12/7/2017    Indications   Long-term (current) use of anticoagulants [Z79.01] [Z79.01]  Atrial fibrillation (H) [I48.91]         November 2017 Details    Sun Mon Tue Wed Thu Fri Sat        1               2               3               4                 5               6               7               8               9               10               11                 12               13               14               15               16               17               18                 19               20               21               22               23               24               25                 26               27               28               29               30      5 mg   See details         Date Details   11/30 This INR check               How to take your warfarin dose     To take:  5 mg Take 2 of the 2.5 mg tablets.           December 2017 Details    Sun Mon Tue Wed Thu Fri Sat          1      5 mg         2      7.5 mg           3      5 mg         4      5 mg         5      7.5 mg         6      5 mg         7            8               9                 10               11               12               13               14               15               16                 17               18               19               20               21               22               23                 24               25               26               27               28               29               30                 31                      Date Details   No additional details    Date of next INR:  12/7/2017         How to  take your warfarin dose     To take:  5 mg Take 2 of the 2.5 mg tablets.    To take:  7.5 mg Take 3 of the 2.5 mg tablets.

## 2017-11-30 NOTE — PROGRESS NOTES
ANTICOAGULATION FOLLOW-UP CLINIC VISIT    Patient Name:  Malgorzata Lan  Date:  11/30/2017  Contact Type:  Telephone    SUBJECTIVE:     Patient Findings     Positives No Problem Findings           OBJECTIVE    INR   Date Value Ref Range Status   11/30/2017 2.1  Final       ASSESSMENT / PLAN  INR assessment THER    Recheck INR In: 1 WEEK    INR Location Home INR    Billed home INR No      Anticoagulation Summary as of 11/30/2017     INR goal 2.0-3.0   Today's INR 2.1   Maintenance plan 7.5 mg (2.5 mg x 3) on Tue, Sat; 5 mg (2.5 mg x 2) all other days   Full instructions 7.5 mg on Tue, Sat; 5 mg all other days   Weekly total 40 mg   No change documented Marifer Zaragoza, MORRIS   Plan last modified Marifer Zaragoza RN (11/8/2017)   Next INR check 12/7/2017   Priority INR   Target end date     Indications   Long-term (current) use of anticoagulants [Z79.01] [Z79.01]  Atrial fibrillation (H) [I48.91]         Anticoagulation Episode Summary     INR check location Home Draw    Preferred lab     Send INR reminders to  ANTICOAGULATION    Comments [facility] Home INR      Anticoagulation Care Providers     Provider Role Specialty Phone number    ReedMichelle MD Responsible Family Practice [phone]            See the Encounter Report to view Anticoagulation Flowsheet and Dosing Calendar (Go to Encounters tab in chart review, and find the Anticoagulation Therapy Visit)    Dosage adjustment made based on physician directed care plan. INR reported 2.1 on 11/28/17.  Left VM for patient to take 7.5 mg TuSa, 5 mg ROW and recheck 1 week. (this is 40 mg/week)    Marifer Zaragoza RN

## 2017-11-30 NOTE — TELEPHONE ENCOUNTER
Reason for Call:  INR    Who is calling?  Patient    INR Value:  Evening of 11/28: 2.1    Are there any other concerns:  No    Can we leave a detailed message on this number? YES    Phone number patient can be reached at: Home number on file [phone] (home)      Call taken on 11/30/2017 at 12:06 PM by Slime Quiñonez

## 2017-12-11 ENCOUNTER — HOSPITAL ENCOUNTER (OUTPATIENT)
Dept: WOUND CARE | Facility: CLINIC | Age: 77
Discharge: HOME | End: 2017-12-11
Attending: SURGERY | Admitting: SURGERY
Payer: MEDICARE

## 2017-12-11 VITALS
TEMPERATURE: 97.8 F | SYSTOLIC BLOOD PRESSURE: 121 MMHG | HEART RATE: 67 BPM | DIASTOLIC BLOOD PRESSURE: 67 MMHG | RESPIRATION RATE: 18 BRPM

## 2017-12-11 PROCEDURE — 11042 DBRDMT SUBQ TIS 1ST 20SQCM/<: CPT | Performed by: SURGERY

## 2017-12-11 PROCEDURE — A6210 FOAM DRG >16<=48 SQ IN W/O B: HCPCS

## 2018-01-01 ENCOUNTER — TELEPHONE (OUTPATIENT)
Dept: LAB | Facility: CLINIC | Age: 78
End: 2018-01-01

## 2018-01-01 NOTE — MR AVS SNAPSHOT
Malgorzata Lan   10/10/2017   Anticoagulation Therapy Visit    Description:  77 year old female   Provider:  Clair Michelle DO   Department:  [facility]           INR as of 10/10/2017     Today's INR 2.8 (10/9/2017)      Anticoagulation Summary as of 10/10/2017     INR goal 2.0-3.0   Today's INR 2.8 (10/9/2017)   Full instructions 2.5 mg on Mon; 5 mg all other days   Next INR check 10/16/2017    Indications   Long-term (current) use of anticoagulants [Z79.01] [Z79.01]  Atrial fibrillation (H) [I48.91]         Contact Numbers     Clinic Number:         October 2017 Details    Sun Mon Tue Wed Thu Fri Sat     1               2               3               4               5               6               7                 8               9               10      5 mg   See details      11      5 mg         12      5 mg         13      5 mg         14      5 mg           15      5 mg         16            17               18               19               20               21                 22               23               24               25               26               27               28                 29               30               31                    Date Details   10/10 This INR check       Date of next INR:  10/16/2017         How to take your warfarin dose     To take:  2.5 mg Take 1 of the 2.5 mg tablets.    To take:  5 mg Take 2 of the 2.5 mg tablets.           
[id number]

## 2018-01-02 ENCOUNTER — HOSPITAL ENCOUNTER (OUTPATIENT)
Dept: WOUND CARE | Facility: CLINIC | Age: 78
Discharge: HOME | End: 2018-01-02
Attending: PHYSICIAN ASSISTANT | Admitting: PHYSICIAN ASSISTANT
Payer: MEDICARE

## 2018-01-02 ENCOUNTER — ANTICOAGULATION THERAPY VISIT (OUTPATIENT)
Dept: FAMILY MEDICINE | Facility: CLINIC | Age: 78
End: 2018-01-02
Payer: MEDICARE

## 2018-01-02 VITALS
TEMPERATURE: 97.7 F | RESPIRATION RATE: 18 BRPM | DIASTOLIC BLOOD PRESSURE: 63 MMHG | HEART RATE: 18 BPM | SYSTOLIC BLOOD PRESSURE: 109 MMHG

## 2018-01-02 DIAGNOSIS — G89.4 CHRONIC PAIN SYNDROME: ICD-10-CM

## 2018-01-02 LAB — INR PPP: 2.8

## 2018-01-02 PROCEDURE — G0463 HOSPITAL OUTPT CLINIC VISIT: HCPCS | Mod: 25

## 2018-01-02 PROCEDURE — A6210 FOAM DRG >16<=48 SQ IN W/O B: HCPCS

## 2018-01-02 PROCEDURE — 11045 DBRDMT SUBQ TISS EACH ADDL: CPT | Performed by: PHYSICIAN ASSISTANT

## 2018-01-02 PROCEDURE — 11042 DBRDMT SUBQ TIS 1ST 20SQCM/<: CPT | Performed by: PHYSICIAN ASSISTANT

## 2018-01-02 PROCEDURE — 40000809 ZZH STATISTIC NO DOCUMENTATION TO SUPPORT CHARGE

## 2018-01-02 NOTE — PROGRESS NOTES
ANTICOAGULATION FOLLOW-UP CLINIC VISIT    Patient Name:  Malgorzata Lan  Date:  1/2/2018  Contact Type:  Telephone    SUBJECTIVE:     Patient Findings     Positives No Problem Findings           OBJECTIVE    INR   Date Value Ref Range Status   01/02/2018 2.8  Final       ASSESSMENT / PLAN  INR assessment THER    Recheck INR In: 2 WEEKS    INR Location Home INR    Billed home INR No      Anticoagulation Summary as of 1/2/2018     INR goal 2.0-3.0   Today's INR 2.8   Maintenance plan 7.5 mg (2.5 mg x 3) on Tue, Sat; 5 mg (2.5 mg x 2) all other days   Full instructions 7.5 mg on Tue, Sat; 5 mg all other days   Weekly total 40 mg   No change documented Marifer Zaragoza, MORRIS   Plan last modified Marifer Zaragoza RN (11/8/2017)   Next INR check 1/23/2018   Priority INR   Target end date     Indications   Long-term (current) use of anticoagulants [Z79.01] [Z79.01]  Atrial fibrillation (H) [I48.91]         Anticoagulation Episode Summary     INR check location Home Draw    Preferred lab     Send INR reminders to  ANTICOAGULATION    [facility]      Anticoagulation Care Providers     Provider Role Specialty Phone number    DerekMichelle MD Responsible Family Practice [phone]            See the Encounter Report to view Anticoagulation Flowsheet and Dosing Calendar (Go to Encounters tab in chart review, and find the Anticoagulation Therapy Visit)    Dosage adjustment made based on physician directed care plan. INR fax 2.8 from [facility].  Reached patient. Continue same warfarin dosing.  She would like next INR in office on 1/23/18 after wound clinic appointment.  Booked at this time.    Marifer Zaragoza RN

## 2018-01-02 NOTE — PROGRESS NOTES
[facility]     HISTORY OF PRESENT ILLNESS: Ms. Malgorzata Lan is a 77-year-old female who returns to the [facility] today to follow-up on a left lower leg venous ulceration. She has been followed by Dr. Fair as well as myself. Her wound continues to improve. She has seen progress with more frequent debridements and tighter compression.    Today, she is accompanied by her significant other, Krystian. They are planning on going to [place] for two weeks and he will be doing her wound cares during that time period.      INTERVAL HISTORY: Last visit we added vinegar soaks with dressing changes. She continues to improve.      WOUND CARE: Iodofoam covered with an ABD and SpandaGrip E changed 3x a week. We attempted placing Endoform under the Iodofoam but this did not seem to provide any benefit.      VITALS: /63  Pulse (!) 18  Temp 97.7  F (36.5  C) (Tympanic)  Resp 18  LMP  (LMP Unknown)     PHYSICAL EXAM:  GENERAL: Patient is alert and oriented and in no acute distress  INTEGUMENTARY:   WOUND ASSESSMENT:     Location: left anterior lower leg                        Stage/Thickness: Full    Size: 5.4 cm x 6.3 cm with a depth of 0.1 cm    Drainage: copious amount of serosanguinous drainage    Wound description: primarily granular with focal areas of necrotic tissue     PROCEDURE: Per standing orders, topical 4% lidocaine was applied to the wound by the [facility]. Timeout was called and informed consent was obtained. Using a sharp curette a surgical debridement was performed down to and including subcutaneous tissue of more than 20 but less than 40 cm. Hemostasis was achieved with pressure. The patient tolerated the procedure well.      ASSESSMENT: improving venous ulcerations of left lower leg with fat layer exposed     PLAN:   1. Primary dressing:Iodofoam; Secondary dressing: ABD pad, SpandaGrip E       FOLLOW-UP: 3 weeks     NIMCO REDD PA-C

## 2018-01-03 RX ORDER — TRAMADOL HYDROCHLORIDE 50 MG/1
TABLET ORAL
Qty: 240 TABLET | Refills: 0 | Status: SHIPPED | OUTPATIENT
Start: 2018-01-03 | End: 2018-02-23

## 2018-01-03 NOTE — TELEPHONE ENCOUNTER
Pending Prescriptions:                       Disp   Refills    traMADol (ULTRAM) 50 MG tablet [Pharmacy *240 ta*0            Sig: TAKE 1 TO 2 TABLETS BY MOUTH EVERY 6 HOURS AS           NEEDED FOR PAIN          Last Written Prescription Date:  11/06/17  Last Fill Quantity: 240,   # refills: 0  Last Office Visit: 10/30/17  Future Office visit:    Next 5 appointments (look out 90 days)     Jan 23, 2018  1:00 PM CST   Return Visit with Jayde Mckinney PA-C   [facility] ([facility])    [address] [facility] [address]  [facility] [address]   [phone]                   Routing refill request to provider for review/approval because:  Drug not on the [facility], [facility] or  Health refill protocol or controlled substance

## 2018-01-08 ENCOUNTER — TELEPHONE (OUTPATIENT)
Dept: FAMILY MEDICINE | Facility: CLINIC | Age: 78
End: 2018-01-08

## 2018-01-08 DIAGNOSIS — R29.6 FALLS FREQUENTLY: ICD-10-CM

## 2018-01-08 DIAGNOSIS — E66.01 MORBID OBESITY (H): Chronic | ICD-10-CM

## 2018-01-08 DIAGNOSIS — M62.81 GENERALIZED MUSCLE WEAKNESS: Primary | ICD-10-CM

## 2018-01-08 NOTE — TELEPHONE ENCOUNTER
Reason for Call: Request for an order or referral:    Order or referral being requested: pt needs a new electric scooter      Date needed: as soon as possible    Has the patient been seen by the PCP for this problem? YES    Additional comments: PT is out of town, but there is a handicap store she would like to get it from where she is.[place] [employer]     Phone number Patient can be reached at:  [phone]     Best Time:  Any    Can we leave a detailed message on this number?  YES    Call taken on 1/8/2018 at 1:42 PM by More Armendariz

## 2018-01-08 NOTE — LETTER
[place] [address] [facility] [address]  Phone: [phone]    January 10, 2018        Maglorzata Lan  [facility]  [facility] [address]          To: [place] in [place]  Phone # is [phone]    RE: Malgorzata Lan requires a Dennis Lift due to history of frailty, frequent falls, obesity and mostly wheelchair bound.    Please contact me for questions or concerns.      Sincerely,        Clair Michelle, DO

## 2018-01-08 NOTE — TELEPHONE ENCOUNTER
There is usually a large process for electric power mobility devices. Please find out which company she is going to go through, if they require (via Medicare) another full physical therapy evaluation or not since this is a re-order, etc.  Thanks!

## 2018-01-09 NOTE — TELEPHONE ENCOUNTER
Patient already has the electric scooter.  She is asking for a letter for a Dennis lift due to frequent falls.  She needs the letter along with a prescription for it.    They can be faxed to [facility] in [place] phone # is [phone].  Please let patient know either way.  Tatyana Anthony CMA

## 2018-01-10 PROBLEM — R29.6 FALLS FREQUENTLY: Status: ACTIVE | Noted: 2018-01-10

## 2018-01-11 NOTE — TELEPHONE ENCOUNTER
Called [employer] obtained fax # [phone] faxed note from MD as well as DME order for Dennis Enriquez- CMA

## 2018-01-11 NOTE — TELEPHONE ENCOUNTER
I don't think this is going to work and Medicare will want a F2F office visit for the Dennis but we can try. Letter and DME order in outgoing fax box. HOWEVER, per Tatyana's note below, the number listed is the PHONE number so please try to call it and get the FAX number.   Thanks!

## 2018-01-22 ENCOUNTER — ANTICOAGULATION THERAPY VISIT (OUTPATIENT)
Dept: FAMILY MEDICINE | Facility: CLINIC | Age: 78
End: 2018-01-22
Payer: MEDICARE

## 2018-01-22 ENCOUNTER — DOCUMENTATION ONLY (OUTPATIENT)
Dept: CARE COORDINATION | Facility: CLINIC | Age: 78
End: 2018-01-22

## 2018-01-22 DIAGNOSIS — I48.91 ATRIAL FIBRILLATION (H): ICD-10-CM

## 2018-01-22 DIAGNOSIS — Z79.01 LONG-TERM (CURRENT) USE OF ANTICOAGULANTS: ICD-10-CM

## 2018-01-22 LAB — INR PPP: 1.4

## 2018-01-22 PROCEDURE — 99207 ZZC NO CHARGE NURSE ONLY: CPT | Performed by: INTERNAL MEDICINE

## 2018-01-22 NOTE — PROGRESS NOTES
ANTICOAGULATION FOLLOW-UP CLINIC VISIT    Patient Name:  Malgorzata Lan  Date:  1/22/2018  Contact Type:  Telephone/ Pt    SUBJECTIVE:     Patient Findings     Positives Missed doses           OBJECTIVE    INR   Date Value Ref Range Status   01/02/2018 2.8  Final       ASSESSMENT / PLAN  INR assessment SUB    Recheck INR In: 1 DAY    INR Location Home INR      Anticoagulation Summary as of 1/22/2018     INR goal 2.0-3.0   Today's INR    Maintenance plan 7.5 mg (2.5 mg x 3) on Tue, Sat; 5 mg (2.5 mg x 2) all other days   Full instructions 1/22: 7.5 mg; Otherwise 7.5 mg on Tue, Sat; 5 mg all other days   Weekly total 40 mg   Plan last modified Marifer Zaragoza RN (11/8/2017)   Next INR check 1/23/2018   Priority INR   Target end date     Indications   Long-term (current) use of anticoagulants [Z79.01] [Z79.01]  Atrial fibrillation (H) [I48.91]         Anticoagulation Episode Summary     INR check location Home Draw    Preferred lab     Send INR reminders to  ANTICOAGULATION    Comments [facility] INR      Anticoagulation Care Providers     Provider Role Specialty Phone number    Michelle Reed MD [place] [phone]            See the Encounter Report to view Anticoagulation Flowsheet and Dosing Calendar (Go to Encounters tab in chart review, and find the Anticoagulation Therapy Visit)    Dosage adjustment made based on physician directed care plan.  Pt missed coumadin x4 days when out of town.  Pt took INR Sat, and started up INR that day.  Today dosed to 7.5 mg, recheck tomorrow.    eLni Renae RN

## 2018-01-22 NOTE — MR AVS SNAPSHOT
Malgorzata Lan   1/22/2018   Anticoagulation Therapy Visit    Description:  77 year old female   Provider:  Clair Michelle DO   Department:  [facility]           INR as of 1/22/2018     Today's INR 1.4!      Anticoagulation Summary as of 1/22/2018     INR goal 2.0-3.0   Today's INR 1.4!   Full instructions 1/22: 7.5 mg; Otherwise 7.5 mg on Tue, Sat; 5 mg all other days   Next INR check 1/23/2018    Indications   Long-term (current) use of anticoagulants [Z79.01] [Z79.01]  Atrial fibrillation (H) [I48.91]         Your next Anticoagulation Clinic appointment(s)     Jan 23, 2018  2:00 PM CST   Anticoagulation Visit with  [facility] CLINIC   [facility]    [address]  [place] [address]   [phone]              January 2018 Details    Sun Mon Tue Wed Thu Fri Sat      1               2               3               4               5               6                 7               8               9               10               11               12               13                 14               15               16               17               18               19               20                 21               22      7.5 mg   See details      23            24               25               26               27                 28               29               30               31                   Date Details   01/22 This INR check       Date of next INR:  1/23/2018         How to take your warfarin dose     To take:  7.5 mg Take 3 of the 2.5 mg tablets.

## 2018-01-22 NOTE — PROGRESS NOTES
[facility] now requests orders and shares plan of care/discharge summaries for some patients through [employer].  Please REPLY TO THIS MESSAGE in order to give authorization for orders when needed.  This is considered a verbal order, you will still receive a faxed copy of orders for signature.  Thank you for your assistance in improving collaboration for our patients.    ORDER    Skilled nursing   [id number], [id number], 2 PRN  HHA [id number], [id number]    MD SUMMARY/PLAN OF CARE    Nursing cares to continue for stasis ulcer wound cares to LLE. HHA to assist with personal cares/ showers.

## 2018-01-23 ENCOUNTER — HOSPITAL ENCOUNTER (OUTPATIENT)
Dept: WOUND CARE | Facility: CLINIC | Age: 78
Discharge: HOME | End: 2018-01-23
Attending: PHYSICIAN ASSISTANT | Admitting: PHYSICIAN ASSISTANT
Payer: MEDICARE

## 2018-01-23 ENCOUNTER — OFFICE VISIT (OUTPATIENT)
Dept: FAMILY MEDICINE | Facility: CLINIC | Age: 78
End: 2018-01-23
Payer: MEDICARE

## 2018-01-23 ENCOUNTER — ANTICOAGULATION THERAPY VISIT (OUTPATIENT)
Dept: NURSING | Facility: CLINIC | Age: 78
End: 2018-01-23
Payer: MEDICARE

## 2018-01-23 VITALS — DIASTOLIC BLOOD PRESSURE: 85 MMHG | HEART RATE: 115 BPM | TEMPERATURE: 98 F | SYSTOLIC BLOOD PRESSURE: 159 MMHG

## 2018-01-23 VITALS
OXYGEN SATURATION: 96 % | HEIGHT: 65 IN | WEIGHT: 249 LBS | HEART RATE: 100 BPM | SYSTOLIC BLOOD PRESSURE: 141 MMHG | TEMPERATURE: 97.1 F | DIASTOLIC BLOOD PRESSURE: 94 MMHG | BODY MASS INDEX: 41.48 KG/M2

## 2018-01-23 DIAGNOSIS — Z02.89 ENCOUNTER FOR COMPLETION OF FORM WITH PATIENT: Primary | ICD-10-CM

## 2018-01-23 DIAGNOSIS — I48.91 ATRIAL FIBRILLATION (H): ICD-10-CM

## 2018-01-23 DIAGNOSIS — Z79.01 LONG-TERM (CURRENT) USE OF ANTICOAGULANTS: ICD-10-CM

## 2018-01-23 LAB — INR POINT OF CARE: 1.2 (ref 0.86–1.14)

## 2018-01-23 PROCEDURE — 11042 DBRDMT SUBQ TIS 1ST 20SQCM/<: CPT

## 2018-01-23 PROCEDURE — 99207 ZZC NO CHARGE NURSE ONLY: CPT

## 2018-01-23 PROCEDURE — A6210 FOAM DRG >16<=48 SQ IN W/O B: HCPCS

## 2018-01-23 PROCEDURE — 99212 OFFICE O/P EST SF 10 MIN: CPT | Performed by: NURSE PRACTITIONER

## 2018-01-23 PROCEDURE — 85610 PROTHROMBIN TIME: CPT | Mod: QW

## 2018-01-23 PROCEDURE — 36416 COLLJ CAPILLARY BLOOD SPEC: CPT

## 2018-01-23 PROCEDURE — 11042 DBRDMT SUBQ TIS 1ST 20SQCM/<: CPT | Performed by: PHYSICIAN ASSISTANT

## 2018-01-23 NOTE — MR AVS SNAPSHOT
After Visit Summary   1/23/2018    Malgorzata Lan    MRN: [id number]           Patient Information     Date Of Birth          1940        Visit Information        Provider Department      1/23/2018 2:30 PM Sohail Gaston APRN CNP [facility]        Today's Diagnoses     Encounter for completion of form with patient    -  1       Follow-ups after your visit        Your next 10 appointments already scheduled     Feb 07, 2018  2:00 PM CST   Remote PPM Check with BUCIO TECH1   [facility] ([facility])    [address] [place], [place] [id number]  [facility] [id number] [phone]           This appointment is for a remote check of your pacemaker.  This is not an appointment at the office.              If Something Goes Wrong     I will check my blood sugars twice a day     Notes - Note created  7/17/2014 10:25 AM by Jeannine Baker RN    As of today's date 7/17/2014 goal is met at 76 - 100%.   Goal Status:  Active        Who to contact     If you have questions or need follow up information about today's clinic visit or your schedule please contact [facility] directly at [phone].  Normal or non-critical lab and imaging results will be communicated to you by MyChart, letter or phone within 4 business days after the clinic has received the results. If you do not hear from us within 7 days, please contact the clinic through MyChart or phone. If you have a critical or abnormal lab result, we will notify you by phone as soon as possible.  Submit refill requests through [employer] or call your pharmacy and they will forward the refill request to us. Please allow 3 business days for your refill to be completed.          Additional Information About Your Visit        MyChart Information     [employer] lets you send messages to your doctor, view your test results, renew your prescriptions, schedule appointments and more. To sign up, go to  "[email or web address] . Click on \"Log in\" on the left side of the screen, which will take you to the Welcome page. Then click on \"Sign up Now\" on the right side of the page.     You will be asked to enter the access code listed below, as well as some personal information. Please follow the directions to create your username and password.     Your access code is: [email or web address]  Expires: 2018  1:38 PM     Your access code will  in 90 days. If you need help or a new code, please call your [facility] clinic or [phone].        Care EveryWhere ID     This is your Care EveryWhere ID. This could be used by other organizations to access your [facility] medical records  [id number]        Your Vitals Were     Pulse Temperature Height Last Period Pulse Oximetry Breastfeeding?    100 97.1  F (36.2  C) (Oral) 5' 5\" (1.651 m) (LMP Unknown) 96% No    BMI (Body Mass Index)                   41.44 kg/m2            Blood Pressure from Last 3 Encounters:   18 (!) 141/94   18 159/85   18 109/63    Weight from Last 3 Encounters:   18 249 lb (112.9 kg)   17 257 lb (116.6 kg)   10/30/17 262 lb (118.8 kg)              Today, you had the following     No orders found for display         Today's Medication Changes          These changes are accurate as of 18 11:59 PM.  If you have any questions, ask your nurse or doctor.               These medicines have changed or have updated prescriptions.        Dose/Directions    potassium chloride SA 20 MEQ CR tablet   Commonly known as:  K-DUR/KLOR-CON M   This may have changed:  Another medication with the same name was removed. Continue taking this medication, and follow the directions you see here.   Used for:  Congestive heart failure, unspecified congestive heart failure chronicity, unspecified congestive heart failure type (H)   Changed by:  Sohail Gaston APRN CNP        TAKE 1 TABLET(20 MEQ) BY MOUTH TWICE DAILY   Quantity:  180 tablet "   Refills:  0                Primary Care Provider Office Phone # Fax #    Clair Michelle [phone] [address] [facility] [address]  [facility] [address]        Goals        General    I will call my PCP right away if I have increased shortness of breath.   (pt-stated)     Notes - Note created  7/17/2014 10:27 AM by Jeannine Baker RN    As of today's date 7/17/2014 goal is met at 51 - 75%.   Goal Status:  Active      I will weigh myself daily and records results (pt-stated)     Notes - Note created  7/17/2014 10:25 AM by Jeannine Baker RN    As of today's date 7/17/2014 goal is met at 76 - 100%.   Goal Status:  Active        Equal Access to Services     HUMBERTO JIMENEZ : Hadii aravind loza hadasho Soomaali, waaxda luqadaha, qaybta kaalmada adeegyada, emmanuel flores . So [facility] [phone].    ATENCIÓN: Si habla español, tiene a bergeron disposición servicios gratuitos de asistencia lingüística. Llame al [phone].    We comply with applicable federal civil rights laws and [place] laws. We do not discriminate on the basis of race, color, national origin, age, disability, sex, sexual orientation, or gender identity.            Thank you!     Thank you for choosing [facility]  for your care. Our goal is always to provide you with excellent care. Hearing back from our patients is one way we can continue to improve our services. Please take a few minutes to complete the written survey that you may receive in the mail after your visit with us. Thank you!             Your Updated Medication List - Protect others around you: Learn how to safely use, store and throw away your medicines at www.disposemymeds.org.          This list is accurate as of 1/23/18 11:59 PM.  Always use your most recent med list.                   Brand Name Dispense Instructions for use Diagnosis    acetaminophen 650 MG 8 hour tablet     100 tablet    Take 650 mg by mouth every 6 hours as needed for mild pain     S/P hysterectomy with oophorectomy       albuterol (5 MG/ML) 0.5% neb solution    PROVENTIL    60 vial    Take 0.5 mLs (2.5 mg) by nebulization every 4 hours as needed for wheezing or shortness of breath / dyspnea (dyspnea)    COPD exacerbation (H)       alendronate 70 MG tablet    FOSAMAX    12 tablet    Take 1 tablet (70 mg) by mouth every 7 days Swallow with a full glass of water an hour before breakfast and stay upright afterwards.    Osteopenia, unspecified location       alum & mag hydroxide-simethicone 400-400-40 MG/5ML Susp suspension    MYLANTA ES/MAALOX  ES     Take 15-30 mLs by mouth every 4 hours as needed for indigestion    S/P hysterectomy with oophorectomy       ASPIRIN NOT PRESCRIBED    INTENTIONAL    0 each    Antiplatelet medication not prescribed intentionally due to Current anticoagulant therapy (warfarin/enoxaparin)    Type 2 diabetes mellitus with diabetic nephropathy, with long-term current use of insulin (H)       atorvastatin 40 MG tablet    LIPITOR    90 tablet    TAKE 1 TABLET(40 MG) BY MOUTH AT BEDTIME    Mixed hyperlipidemia       betamethasone valerate 0.1 % ointment    VALISONE    15 g    Apply topically daily as needed To inside of ears.    Dermatitis       blood glucose monitoring test strip    no brand specified    3 Month    Use to test blood sugars 3 -4  times daily or as directed        budesonide 0.5 MG/2ML neb solution    PULMICORT    60 ampule    Take 2 mLs (0.5 mg) by nebulization 2 times daily as needed    COPD exacerbation (H)       carvedilol 6.25 MG tablet    COREG    180 tablet    Take 2 tablets (12.5 mg) by mouth 2 times daily (with meals)    Chronic atrial fibrillation (H), Congestive heart failure, unspecified congestive heart failure chronicity, unspecified congestive heart failure type (H)       CINNAMON PO      Take 1,000 mg by mouth daily        clotrimazole 1 % cream    LOTRIMIN    15 g    Apply topically 2 times daily    Tinea corporis       cod liver oil  Caps capsule      Take 1 capsule by mouth daily        digoxin 125 MCG tablet    LANOXIN    90 tablet    Take 1 tablet (125 mcg) by mouth daily    Atrial fibrillation (H)       fluticasone 50 MCG/ACT spray    FLONASE    3 Bottle    Spray 1-2 sprays into both nostrils daily    Nasal congestion       JANTOVEN 2.5 MG tablet   Generic drug:  warfarin     100 tablet    Take 1-2 tablets daily or as directed by INR clinic    Chronic atrial fibrillation (H)       latanoprost 0.005 % ophthalmic solution    XALATAN     Place 1 drop into both eyes At Bedtime.        Lidocaine 0.5 % Aero     170 g    Apply as needed prior to dressing changes    Non-healing wound of lower extremity, left, subsequent encounter       Lidocaine HCl 4 % Crea     63 g    Externally apply topically daily as needed Prior to dressing changes    Non-healing wound of lower extremity, left, subsequent encounter       lisinopril 5 MG tablet    PRINIVIL/ZESTRIL    90 tablet    Take 1 tablet (5 mg) by mouth daily    Type 2 diabetes mellitus with diabetic nephropathy, with long-term current use of insulin (H)       meclizine 25 MG tablet    ANTIVERT    270 tablet    TAKE 1 TABLET(25 MG) BY MOUTH THREE TIMES DAILY AS NEEDED FOR DIZZINESS    Vertigo       nitroGLYcerin 0.4 MG sublingual tablet    NITROSTAT    30 tablet    If chest pain place under tongue call 911 if pain not resolved/ repeat every 5 min for total of 3 doses    Congestive heart failure, unspecified congestive heart failure chronicity, unspecified congestive heart failure type (H)       NovoLIN MIX 70/30 VIAL injection   Generic drug:  insulin NPH-insulin regular     10 mL    INJECT 30 UNITS SUBCUTANEOUSLY TWICE DAILY WITH MEALS.    Type 2 diabetes mellitus with stage 3 chronic kidney disease (H)       nystatin [id number] UNIT/GM Powd    MYCOSTATIN    60 g    Apply topically 3 times daily as needed    Tinea corporis       order for DME     1 each    Equipment being ordered: Dennis lift due to frequent  "falls. Lifetime need. Height 5'5\" and Weight 257#.    Generalized muscle weakness, Morbid obesity (H), Falls frequently       oxybutynin 5 MG tablet    DITROPAN    90 tablet    Take 1 tablet (5 mg) by mouth daily    Urinary incontinence, unspecified type       pentoxifylline 400 MG CR tablet    TRENtal    270 tablet    TAKE 1 TABLET(400 MG) BY MOUTH THREE TIMES DAILY WITH MEALS    Peripheral vascular disease (H)       polyethylene glycol Packet    MIRALAX/GLYCOLAX     Take 1 packet by mouth 2 times daily as needed for constipation        potassium chloride SA 20 MEQ CR tablet    K-DUR/KLOR-CON M    180 tablet    TAKE 1 TABLET(20 MEQ) BY MOUTH TWICE DAILY    Congestive heart failure, unspecified congestive heart failure chronicity, unspecified congestive heart failure type (H)       torsemide 10 MG tablet    DEMADEX    180 tablet    Take 2 tablets (20 mg) by mouth At Bedtime    CHF (congestive heart failure) (H)       traMADol 50 MG tablet    ULTRAM    240 tablet    TAKE 1 TO 2 TABLETS BY MOUTH EVERY 6 HOURS AS NEEDED FOR PAIN    Chronic pain syndrome       Vitamin B 12 100 MCG Lozg     100 lozenge    Take 1,000 mcg by mouth daily    Vitamin B12 deficiency (non anemic)       vitamin D3 2000 UNITS Caps     90 capsule    Take 2,000 Units by mouth daily    Osteoporosis         "

## 2018-01-23 NOTE — PROGRESS NOTES
HPI      SUBJECTIVE:   Malgorzata Lan is a 77 year old female who presents to clinic today for the following health issues:    Chief Complaint   Patient presents with     Forms      Transit Service [facility] in [place].  Leaves Saturday 1-27-18 for about 4 months.  Will be seen at [facility] there. Was seen wound clinic upstairs earlier today        Patient is here to complete forms to get transportation while she is in [place] for an extended period  She has no complaints today and is doing well. She is looking forward to this trip.      Past Medical History:   Diagnosis Date     Aortic stenosis, moderate     on echo 3-14      Chronic leg pain      Congestive heart failure, unspecified     worsened during hospitalization/ diastolic failure  echo 3-2014 EF 55-60%/      COPD (chronic obstructive pulmonary disease) (H)      Depression      Diabetes mellitus (H)      Diastolic dysfunction      DJD (degenerative joint disease)      Glaucoma      Hyperlipidemia      Hyperparathyroidism (H)      Hypertension      Influenza A with pneumonia 3-2014    hospitalized      Lung cancer (H)      Morbid obesity (H)      Obstructive sleep apnea     wears CPAP     Pancytopenia (H)      Peripheral vascular disease (H)      Persistent atrial fibrillation (H) 06/08/2004     Pickwickian syndrome (H)      Renal insufficiency      Respiratory failure (H) 3-2014    hospitalized     Sinus node dysfunction (H)     TACHYBRADY SYNDROME s/p PPM     Squamous cell carcinoma of lung, stage I (H)      Syncope      Past Surgical History:   Procedure Laterality Date     APPLY WOUND VAC Right 8/4/2015    Procedure: APPLY WOUND VAC;  Surgeon: Yandel Fair MD;  Location:  SD     BONE MARROW BIOPSY, BONE SPECIMEN, NEEDLE/TROCAR  10/30/2013    Procedure: BIOPSY BONE MARROW;  BONE MARROW BIOPSY ;  Surgeon: Kimani Lobo MD;  Location:  GI     CHOLECYSTECTOMY       DAVINCI HYSTERECTOMY TOTAL, BILATERAL SALPINGO-OOPHORECTOMY,  "NODE DISSECTION, COMBINED N/A 3/30/2016    Procedure: COMBINED DAVINCI HYSTERECTOMY TOTAL, SALPINGO-OOPHORECTOMY, NODE DISSECTION;  Surgeon: Kristin Arriaza MD;  Location: [facility] OR     DILATION AND CURETTAGE N/A 3/18/2016    Procedure: DILATION AND CURETTAGE;  Surgeon: Gilmer Cruz MD;  Location: [facility] OR     GRAFT SKIN SPLIT THICKNESS FROM EXTREMITY Right 8/4/2015    Procedure: GRAFT SKIN SPLIT THICKNESS FROM EXTREMITY;  Surgeon: Yandel Fair MD;  Location:  SD     IMPLANT PACEMAKER  7/31/2013    Single chamber [place] [facility] PPM      IRRIGATION AND DEBRIDEMENT LOWER EXTREMITY, COMBINED Right 7/10/2015    Procedure: COMBINED IRRIGATION AND DEBRIDEMENT LOWER EXTREMITY;  Surgeon: Yandel Fair MD;  Location:  OR     ORTHOPEDIC SURGERY      t oes     SINUS SURGERY       THORACOSCOPIC WEDGE RESECTION LUNG Right 3/17/2015    Procedure: THORACOSCOPIC WEDGE RESECTION LUNG;  Surgeon: Santos Crenshaw MD;  Location:  OR     tonsils       Social History   Substance Use Topics     Smoking status: Former Smoker     Packs/day: 1.00     Years: 30.00     Types: Cigarettes     Quit date: 8/14/1993     Smokeless tobacco: Never Used     Alcohol use 0.0 oz/week     0 Standard drinks or equivalent per week      Comment: 2 a week      Current Outpatient Prescriptions   Medication Sig Dispense Refill     order for DME Equipment being ordered: Dennis lift due to frequent falls. Lifetime need. Height 5'5\" and Weight 257#. 1 each 0     traMADol (ULTRAM) 50 MG tablet TAKE 1 TO 2 TABLETS BY MOUTH EVERY 6 HOURS AS NEEDED FOR PAIN 240 tablet 0     potassium chloride SA (K-DUR/KLOR-CON M) 20 MEQ CR tablet TAKE 1 TABLET(20 MEQ) BY MOUTH TWICE DAILY 180 tablet 0     clotrimazole (LOTRIMIN) 1 % cream Apply topically 2 times daily 15 g 11     nystatin (MYCOSTATIN) 070336 UNIT/GM POWD Apply topically 3 times daily as needed 60 g 11     Lidocaine HCl 4 % CREA Externally apply topically daily as needed Prior to " dressing changes 63 g 3     NOVOLIN MIX 70/30 VIAL (70-30) 100 UNIT/ML susp INJECT 30 UNITS SUBCUTANEOUSLY TWICE DAILY WITH MEALS. 10 mL 5     lisinopril (PRINIVIL/ZESTRIL) 5 MG tablet Take 1 tablet (5 mg) by mouth daily 90 tablet 3     atorvastatin (LIPITOR) 40 MG tablet TAKE 1 TABLET(40 MG) BY MOUTH AT BEDTIME 90 tablet 3     pentoxifylline (TRENTAL) 400 MG CR tablet TAKE 1 TABLET(400 MG) BY MOUTH THREE TIMES DAILY WITH MEALS 270 tablet 3     meclizine (ANTIVERT) 25 MG tablet TAKE 1 TABLET(25 MG) BY MOUTH THREE TIMES DAILY AS NEEDED FOR DIZZINESS 270 tablet 3     alendronate (FOSAMAX) 70 MG tablet Take 1 tablet (70 mg) by mouth every 7 days Swallow with a full glass of water an hour before breakfast and stay upright afterwards. 12 tablet 3     fluticasone (FLONASE) 50 MCG/ACT spray Spray 1-2 sprays into both nostrils daily 3 Bottle 3     oxybutynin (DITROPAN) 5 MG tablet Take 1 tablet (5 mg) by mouth daily 90 tablet 3     JANTOVEN 2.5 MG tablet Take 1-2 tablets daily or as directed by INR clinic 100 tablet 3     Lidocaine 0.5 % AERO Apply as needed prior to dressing changes 170 g 1     carvedilol (COREG) 6.25 MG tablet Take 2 tablets (12.5 mg) by mouth 2 times daily (with meals) 180 tablet 3     betamethasone valerate (VALISONE) 0.1 % ointment Apply topically daily as needed To inside of ears. 15 g 2     digoxin (LANOXIN) 125 MCG tablet Take 1 tablet (125 mcg) by mouth daily 90 tablet 3     torsemide (DEMADEX) 10 MG tablet Take 2 tablets (20 mg) by mouth At Bedtime 180 tablet 3     blood glucose monitoring (NO BRAND SPECIFIED) test strip Use to test blood sugars 3 -4  times daily or as directed 3 Month 3     ASPIRIN NOT PRESCRIBED (INTENTIONAL) Antiplatelet medication not prescribed intentionally due to Current anticoagulant therapy (warfarin/enoxaparin) 0 each 0     albuterol (PROVENTIL) (5 MG/ML) 0.5% nebulizer solution Take 0.5 mLs (2.5 mg) by nebulization every 4 hours as needed for wheezing or shortness of  "breath / dyspnea (dyspnea) 60 vial 1     budesonide (PULMICORT) 0.5 MG/2ML nebulizer solution Take 2 mLs (0.5 mg) by nebulization 2 times daily as needed 60 ampule 1     Cyanocobalamin (VITAMIN B 12) 100 MCG LOZG Take 1,000 mcg by mouth daily 100 lozenge 1     Cholecalciferol (VITAMIN D3) 2000 UNITS CAPS Take 2,000 Units by mouth daily 90 capsule 1     nitroglycerin (NITROSTAT) 0.4 MG SL tablet If chest pain place under tongue call 911 if pain not resolved/ repeat every 5 min for total of 3 doses 30 tablet 3     acetaminophen 650 MG 8 hour tablet Take 650 mg by mouth every 6 hours as needed for mild pain 100 tablet      alum & mag hydroxide-simethicone (MYLANTA ES/MAALOX  ES) 400-400-40 MG/5ML SUSP Take 15-30 mLs by mouth every 4 hours as needed for indigestion  0     Cod Liver Oil CAPS Take 1 capsule by mouth daily       polyethylene glycol (MIRALAX/GLYCOLAX) packet Take 1 packet by mouth 2 times daily as needed for constipation        CINNAMON PO Take 1,000 mg by mouth daily       latanoprost (XALATAN) 0.005 % ophthalmic solution Place 1 drop into both eyes At Bedtime.       Allergies   Allergen Reactions     Food Anaphylaxis     With green peppers     Codeine Sulfate Swelling     Throats swell     Flagyl [Metronidazole Hcl] Swelling     Hydrocodone Visual Disturbance     Pantoprazole Unknown     Penicillins Unknown     Hasn't had it for 30 years     Diltiazem Rash       Reviewed and updated as needed this visit by clinical staff and provider      ROS  Detailed as above       BP (!) 141/94 (BP Location: Right arm, Patient Position: Chair, Cuff Size: Adult Large)  Pulse 100  Temp 97.1  F (36.2  C) (Oral)  Ht 5' 5\" (1.651 m)  Wt 249 lb (112.9 kg)  LMP  (LMP Unknown)  SpO2 96%  Breastfeeding? No  BMI 41.44 kg/m2        Physical Exam   Constitutional: She is well-developed, well-nourished, and in no distress.   Using motorized wheelchair   Eyes: Conjunctivae are normal.   Pulmonary/Chest: Effort normal. "   Neurological: She is alert.   Psychiatric: Mood and affect normal.   Vitals reviewed.      Assessment and Plan:       ICD-10-CM    1. Encounter for completion of form with patient Z02.89      Completed transportation forms with patient.      The total visit time was 10 minutes more  than 50% was spent in counseling and coordination of care as discussed above.      MAZIN Mccoy, CNP  [facility]

## 2018-01-23 NOTE — PROGRESS NOTES
ANTICOAGULATION FOLLOW-UP CLINIC VISIT    Patient Name:  Malgorzata Lan  Date:  1/23/2018  Contact Type:  Face to Face    SUBJECTIVE:     Patient Findings     Positives Missed doses           OBJECTIVE    INR Protime   Date Value Ref Range Status   01/23/2018 1.2 (A) 0.86 - 1.14 Final       ASSESSMENT / PLAN  INR assessment SUB    Recheck INR In: 2 DAYS    INR Location Home INR      Anticoagulation Summary as of 1/23/2018     INR goal 2.0-3.0   Today's INR 1.2!   Maintenance plan 7.5 mg (2.5 mg x 3) on Tue, Sat; 5 mg (2.5 mg x 2) all other days   Full instructions 7.5 mg on Tue, Sat; 5 mg all other days   Weekly total 40 mg   No change documented Britt Jacobo RN   Plan last modified Marifer Zaragoza RN (11/8/2017)   Next INR check 1/25/2018   Priority INR   Target end date     Indications   Long-term (current) use of anticoagulants [Z79.01] [Z79.01]  Atrial fibrillation (H) [I48.91]         Anticoagulation Episode Summary     INR check location Home Draw    Preferred lab     Send INR reminders to  ANTICOAGULATION    Comments [facility] Home INR      Anticoagulation Care Providers     Provider Role Specialty Phone number    ReedMichelle MD [facility] [phone]            See the Encounter Report to view Anticoagulation Flowsheet and Dosing Calendar (Go to Encounters tab in chart review, and find the Anticoagulation Therapy Visit)    Dosage adjustment made based on physician directed care plan.  Patient had several missed doses last week.   Advised 7.5 mg tonight and 5 mg tomorrow with recheck on Thursday  Patient will be in [place] until May starting on Tuesday (1/30/18)  Patient plans to bring [employer] Monitor with her to [place] to self test.     Britt Jacobo, RN

## 2018-01-23 NOTE — NURSING NOTE
"Chief Complaint   Patient presents with     Forms      Transit Service [facility] in [place].  Leaves Saturday 1-27-18 for about 4 months.  Will be seen at [facility] there. Was seen wound clinic upstairs earlier today        Initial BP (!) 141/94 (BP Location: Right arm, Patient Position: Chair, Cuff Size: Adult Large)  Pulse 100  Temp 97.1  F (36.2  C) (Oral)  Ht 5' 5\" (1.651 m)  Wt 249 lb (112.9 kg)  LMP  (LMP Unknown)  SpO2 96%  Breastfeeding? No  BMI 41.44 kg/m2 Estimated body mass index is 41.44 kg/(m^2) as calculated from the following:    Height as of this encounter: 5' 5\" (1.651 m).    Weight as of this encounter: 249 lb (112.9 kg).  Medication Reconciliation: complete    "

## 2018-01-23 NOTE — MR AVS SNAPSHOT
Malgorzata Barrazalee ann   1/23/2018 2:00 PM   Anticoagulation Therapy Visit    Description:  77 year old female   Provider:   ANTICOAGULATION CLINIC   Department:  Cs Nurse           INR as of 1/23/2018     Today's INR 1.2!      Anticoagulation Summary as of 1/23/2018     INR goal 2.0-3.0   Today's INR 1.2!   Full instructions 7.5 mg on Tue, Sat; 5 mg all other days   Next INR check 1/25/2018    Indications   Long-term (current) use of anticoagulants [Z79.01] [Z79.01]  Atrial fibrillation (H) [I48.91]         Your next Anticoagulation Clinic appointment(s)     Jan 23, 2018  2:00 PM CST   Anticoagulation Visit with  ANTICOAGULATION CLINIC   [facility]    [address]  [place] [address]   [phone]              Contact Numbers     Clinic Number:         January 2018 Details    Sun Mon Tue Wed Thu Fri Sat      1               2               3               4               5               6                 7               8               9               10               11               12               13                 14               15               16               17               18               19               20                 21               22               23      7.5 mg   See details      24      5 mg         25            26               27                 28               29               30               31                   Date Details   01/23 This INR check       Date of next INR:  1/25/2018         How to take your warfarin dose     To take:  5 mg Take 2 of the 2.5 mg tablets.    To take:  7.5 mg Take 3 of the 2.5 mg tablets.

## 2018-01-23 NOTE — PROGRESS NOTES
[facility]     HISTORY OF PRESENT ILLNESS: Ms. Malgorzata Lan is a 77-year-old female who returns to the [facility] today to follow-up on a left lower leg venous ulceration. She has been followed by Dr. Fair as well as myself. Her wound continues to improve. She has seen progress with more frequent debridements and tighter compression.    Today, she is accompanied by her significant other, Krystian. They recently returned from [place], [place] and are planning on moving there in the next few weeks. She has found a wound clinic down there and will be transitioning care.      WOUND CARE: Currently dressing the wound with Iodofoam, ABD pad and utilizing SpandaGrip E for compression.      VITALS: /85  Pulse 115  Temp 98  F (36.7  C) (Tympanic)  LMP  (LMP Unknown)     PHYSICAL EXAM:  GENERAL: Patient is alert and oriented and in no acute distress  INTEGUMENTARY:   WOUND ASSESSMENT:     Location: left anterior lower leg                        Stage/Thickness: Full    Size: 4.6 cm x 5.0 cm with a depth of 0.1 cm    Drainage: copious amount of serosanguinous drainage    Wound description: primarily granular with focal areas of necrotic tissue     PROCEDURE: Per standing orders, topical 4% lidocaine was applied to the wound by the [facility]. Timeout was called and informed consent was obtained. Using a sharp curette a surgical debridement was performed down to and including subcutaneous tissue of more than 20 but less than 40 cm. Hemostasis was achieved with pressure. Granulation tissue was then treated with silver nitrate. The patient tolerated the procedure well.      ASSESSMENT: improving venous ulcerations of left lower leg with fat layer exposed     PLAN:   1. Primary dressing:Iodofoam; Secondary dressing: ABD pad, SpandaGrip E  2. Records were given to patient to take to new clinic     FOLLOW-UP: with local wound care in [place]     NIMCO REDD PA-C

## 2018-01-25 ENCOUNTER — TELEPHONE (OUTPATIENT)
Dept: FAMILY MEDICINE | Facility: CLINIC | Age: 78
End: 2018-01-25

## 2018-01-25 ENCOUNTER — TELEPHONE (OUTPATIENT)
Dept: CARDIOLOGY | Facility: CLINIC | Age: 78
End: 2018-01-25

## 2018-01-25 NOTE — TELEPHONE ENCOUNTER
Reason for Call:  INR    Who is calling?  Patient (Malgorzata)    Phone number:  [phone]    INR Value:  1.3    Are there any other concerns:  No    Can we leave a detailed message on this number? YES    Phone number patient can be reached at: Home number on file [phone] (home)      Call taken on 1/25/2018 at 5:29 PM by Slime Quiñonez

## 2018-01-25 NOTE — TELEPHONE ENCOUNTER
Pt called saying she will be out of town for several months and wants to know what to do about her Lat[employer] monitor. Told her she can bring it with her and plug it in where she's going and it will work the same. She uses a land line connection and where she's going also has a land line.     Pt was relieved to hear it was this easy. She will be getting a cell phone number for when she's away and she plans to call us back when she has the new number.

## 2018-01-26 ENCOUNTER — ANTICOAGULATION THERAPY VISIT (OUTPATIENT)
Dept: FAMILY MEDICINE | Facility: CLINIC | Age: 78
End: 2018-01-26
Payer: MEDICARE

## 2018-01-26 DIAGNOSIS — Z79.01 LONG-TERM (CURRENT) USE OF ANTICOAGULANTS: ICD-10-CM

## 2018-01-26 DIAGNOSIS — I48.91 ATRIAL FIBRILLATION (H): ICD-10-CM

## 2018-01-26 DIAGNOSIS — I48.20 CHRONIC ATRIAL FIBRILLATION (H): ICD-10-CM

## 2018-01-26 LAB — INR PPP: 1.3

## 2018-01-26 PROCEDURE — 99207 ZZC NO CHARGE NURSE ONLY: CPT | Performed by: INTERNAL MEDICINE

## 2018-01-26 RX ORDER — WARFARIN SODIUM 2.5 MG/1
TABLET ORAL
Qty: 200 TABLET | Refills: 0 | Status: SHIPPED | OUTPATIENT
Start: 2018-01-26

## 2018-01-26 NOTE — MR AVS SNAPSHOT
Malgorzata Lan   1/26/2018   Anticoagulation Therapy Visit    Description:  77 year old female   Provider:  Clair Michelle DO   Department:  [facility] Family Prac/Im           INR as of 1/26/2018     Today's INR 1.3!      Anticoagulation Summary as of 1/26/2018     INR goal 2.0-3.0   Today's INR 1.3!   Full instructions 1/26: 10 mg; 1/27: 10 mg; 1/28: 10 mg; Otherwise 7.5 mg on Tue, Sat; 5 mg all other days   Next INR check 1/29/2018    Indications   Long-term (current) use of anticoagulants [Z79.01] [Z79.01]  Atrial fibrillation (H) [I48.91]         January 2018 Details    Sun Mon Tue Wed Thu Fri Sat      1               2               3               4               5               6                 7               8               9               10               11               12               13                 14               15               16               17               18               19               20                 21               22               23               24               25               26      10 mg   See details      27      10 mg           28      10 mg         29            30               31                   Date Details   01/26 This INR check       Date of next INR:  1/29/2018         How to take your warfarin dose     To take:  5 mg Take 2 of the 2.5 mg tablets.    To take:  10 mg Take 4 of the 2.5 mg tablets.

## 2018-01-26 NOTE — PROGRESS NOTES
ANTICOAGULATION FOLLOW-UP CLINIC VISIT    Patient Name:  Malgorzata Lan  Date:  1/26/2018  Contact Type:  Telephone/ with pt    SUBJECTIVE:     Patient Findings     Positives Unexplained INR or factor level change    Comments Pt reports taking all doses several times, denies any missed doses, and even increased dosing to what nurse instructed.  Read off label on bottle, verified 2.5mg Jantoven tablets.            OBJECTIVE    INR   Date Value Ref Range Status   01/26/2018 1.3  Final       ASSESSMENT / PLAN  INR assessment SUB    Recheck INR In: 3 DAYS    INR Location Home INR      Anticoagulation Summary as of 1/26/2018     INR goal 2.0-3.0   Today's INR 1.3!   Maintenance plan 7.5 mg (2.5 mg x 3) on Tue, Sat; 5 mg (2.5 mg x 2) all other days   Full instructions 1/26: 10 mg; 1/27: 10 mg; 1/28: 10 mg; Otherwise 7.5 mg on Tue, Sat; 5 mg all other days   Weekly total 40 mg   Plan last modified Marifer Zaragoza RN (11/8/2017)   Next INR check 1/29/2018   Priority INR   Target end date     Indications   Long-term (current) use of anticoagulants [Z79.01] [Z79.01]  Atrial fibrillation (H) [I48.91]         Anticoagulation Episode Summary     INR check location Home Draw    Preferred lab     Send INR reminders to CS ANTICOAGULATION    Comments [facility] Home INR. Patient will be in [place] for winter 2018. Call patient with dosing at [phone] or [phone]      Anticoagulation Care Providers     Provider Role Specialty Phone number    ReedMichelle MD [facility] [phone]            See the Encounter Report to view Anticoagulation Flowsheet and Dosing Calendar (Go to Encounters tab in chart review, and find the Anticoagulation Therapy Visit)    Dosage adjustment made based on physician directed care plan.  See note above. Also sent note to PCP to review current INRs.    Leni Renae RN

## 2018-01-26 NOTE — TELEPHONE ENCOUNTER
PCP,    Pt on coumadin for AFib, has other cardiac co morbidities as well.  Please review pt's recent subtheraputic INRS below.  Pt had originally reported with 1/22/18 INR that she missed 4 doses while out of town.  Now reports has taken coumadin dosing consistently for last 7 days, however INR has not increased. Normal weekly dose is 40 mg(5-7.5 mg daily), For this weekend, dosed at 10mg F, Sat, Sun, recheck Monday, which will put pt at 60mg, or 33% increase for week.  Denies sx bleeding/clots.     Dosed aggressively as pt has airplane trip planned Tuesday to [place].  Please advise only if any changes needed to this plan.      Lab Results   Component Value Date    INR 1.3 01/26/2018    INR 1.2 01/23/2018    INR 1.4 01/22/2018    INR 2.8 01/02/2018    INR 2.1 11/30/2017    INR 3.7 11/21/2017    INR 2.6 11/14/2017    INR 1.4 11/08/2017    INR 1.6 10/31/2017    INR 1.8 10/24/2017    INR 1.7 10/17/2017    INR 2.8 10/09/2017    INR 3.0 10/02/2017

## 2018-02-13 ENCOUNTER — TELEPHONE (OUTPATIENT)
Dept: FAMILY MEDICINE | Facility: CLINIC | Age: 78
End: 2018-02-13

## 2018-02-13 NOTE — TELEPHONE ENCOUNTER
"Rec'd fax from [employer] that patient is 10 days past due for INR check with her [employer] Home Monitoring.    I called patient and spoke with her.   She is currently in [place] for winter months  She said that she \"has not unpacked her home monitoring device yet so has not checked INR\"    I advised that try and check it sometime this week if possible   Patient said \"I will try\"    FYI to PCP.    Britt Jacobo RN    "

## 2018-02-19 ENCOUNTER — ANTICOAGULATION THERAPY VISIT (OUTPATIENT)
Dept: FAMILY MEDICINE | Facility: CLINIC | Age: 78
End: 2018-02-19
Payer: MEDICARE

## 2018-02-19 ENCOUNTER — TELEPHONE (OUTPATIENT)
Dept: FAMILY MEDICINE | Facility: CLINIC | Age: 78
End: 2018-02-19

## 2018-02-19 DIAGNOSIS — Z79.01 LONG-TERM (CURRENT) USE OF ANTICOAGULANTS: ICD-10-CM

## 2018-02-19 DIAGNOSIS — I48.91 ATRIAL FIBRILLATION (H): ICD-10-CM

## 2018-02-19 LAB — INR PPP: 1.4

## 2018-02-19 PROCEDURE — 99207 ZZC NO CHARGE NURSE ONLY: CPT | Performed by: INTERNAL MEDICINE

## 2018-02-19 NOTE — TELEPHONE ENCOUNTER
Reason for Call:  INR    Who is calling?  Carmela With [facility]     Phone number:  [phone]    Fax number:  None    Name of caller: Fito    INR Value:  CV-INR 1.4    Are there any other concerns:  No    Can we leave a detailed message on this number? No    Phone number patient can be reached at: NA      Call taken on 2/19/2018 at 3:22 PM by Rama Dumont

## 2018-02-19 NOTE — MR AVS SNAPSHOT
Malgorzata Lan   2/19/2018   Anticoagulation Therapy Visit    Description:  77 year old female   Provider:  Clair Michelle DO   Department:  [facility] Family Prac/Im           INR as of 2/19/2018     Today's INR 1.4!      Anticoagulation Summary as of 2/19/2018     INR goal 2.0-3.0   Today's INR 1.4!   Full instructions 2/19: 10 mg; Otherwise 7.5 mg on Tue, Sat; 5 mg all other days   Next INR check 2/22/2018    Indications   Long-term (current) use of anticoagulants [Z79.01] [Z79.01]  Atrial fibrillation (H) [I48.91]         February 2018 Details    Sun Mon Tue Wed Thu Fri Sat         1               2               3                 4               5               6               7               8               9               10                 11               12               13               14               15               16               17                 18               19      10 mg   See details      20      7.5 mg         21      5 mg         22            23               24                 25               26               27               28                   Date Details   02/19 This INR check       Date of next INR:  2/22/2018         How to take your warfarin dose     To take:  5 mg Take 2 of the 2.5 mg tablets.    To take:  7.5 mg Take 3 of the 2.5 mg tablets.    To take:  10 mg Take 4 of the 2.5 mg tablets.

## 2018-02-19 NOTE — PROGRESS NOTES
ANTICOAGULATION FOLLOW-UP CLINIC VISIT    Patient Name:  Malgorzata Lan  Date:  2/19/2018  Contact Type:  Telephone    SUBJECTIVE:        OBJECTIVE    INR   Date Value Ref Range Status   02/19/2018 1.4  Final       ASSESSMENT / PLAN  INR assessment SUB    Recheck INR In: 3 DAYS    INR Location Home INR      Anticoagulation Summary as of 2/19/2018     INR goal 2.0-3.0   Today's INR 1.4!   Maintenance plan 7.5 mg (2.5 mg x 3) on Tue, Sat; 5 mg (2.5 mg x 2) all other days   Full instructions 2/19: 10 mg; Otherwise 7.5 mg on Tue, Sat; 5 mg all other days   Weekly total 40 mg   Plan last modified Marifer Zaragoza RN (11/8/2017)   Next INR check 2/22/2018   Priority INR   Target end date     Indications   Long-term (current) use of anticoagulants [Z79.01] [Z79.01]  Atrial fibrillation (H) [I48.91]         Anticoagulation Episode Summary     INR check location Home Draw    Preferred lab     Send INR reminders to  ANTICOAGULATION    Comments [facility] Home INR. Patient will be in [place] for winter 2018. Call patient with dosing at [phone] or [phone]      Anticoagulation Care Providers     Provider Role Specialty Phone number    ReedMichelle MD [facility] [phone]            See the Encounter Report to view Anticoagulation Flowsheet and Dosing Calendar (Go to Encounters tab in chart review, and find the Anticoagulation Therapy Visit)    Dosage adjustment made based on physician directed care plan.  Pt states missed several doses, has had a stressful move. Advised importance of continued dosing, increased today then advised continue regular schedule, Recheck Thursday.    Leni Renae RN

## 2018-02-23 DIAGNOSIS — G89.4 CHRONIC PAIN SYNDROME: ICD-10-CM

## 2018-02-23 RX ORDER — TRAMADOL HYDROCHLORIDE 50 MG/1
TABLET ORAL
Qty: 240 TABLET | Refills: 0 | Status: SHIPPED | OUTPATIENT
Start: 2018-02-23

## 2018-02-23 NOTE — TELEPHONE ENCOUNTER
Controlled Substance Refill Request for Tramadol  Problem List Complete:  Yes    Last Written Prescription Date:  01/03/18  Last Fill Quantity: 240,   # refills: 0    Last Office Visit with [facility] primary care provider: 01/23/18    Clinic visit frequency required:      Future Office visit:     Controlled substance agreement on file: No.     Processing:  Fax Rx to [facility] [place] [facility]   checked in past 6 months?  Yes 09/15/17

## 2018-02-23 NOTE — TELEPHONE ENCOUNTER
Reason for Call:  Medication or medication refill:    Do you use a [facility]?  Name of the pharmacy and phone number for the current request:     [id number]  [place], [place]    Name of the medication requested: traMADol (ULTRAM) 50 MG tablet    Other request: Pharmacy called because they were unable to send this refill request via fax since it is a controlled substance.  The pt is currently in [place], [place]    Can we leave a detailed message on this number? no    Phone number patient can be reached at:     Home Phone [phone]   Mobile [phone]       Best Time: any    Call taken on 2/23/2018 at 2:35 PM by More Armendariz

## 2018-02-24 NOTE — TELEPHONE ENCOUNTER
RX called to [facility] directly in [place],  ([phone]) spoke with pharmacist directly and gave verbal order as written per Dr. Michelle.    Olive Reyes RN

## 2018-04-04 ENCOUNTER — TELEPHONE (OUTPATIENT)
Dept: FAMILY MEDICINE | Facility: CLINIC | Age: 78
End: 2018-04-04

## 2018-04-04 ENCOUNTER — NURSE TRIAGE (OUTPATIENT)
Dept: NURSING | Facility: CLINIC | Age: 78
End: 2018-04-04

## 2018-04-05 NOTE — TELEPHONE ENCOUNTER
Krystian returns our call.  This is his home phone [phone] and the only way to reach him.  He states Malgorzaat is in a nursing home in [place].    Message sent to Dr. Clair Michelle at [facility].

## 2018-04-05 NOTE — TELEPHONE ENCOUNTER
Clinic Action Needed:  Yes  FNA Triage Call  Presenting Problem:    Krystian returns our call.  This is his home phone [phone] and the only way to reach him.  He states Malgorzata is in a nursing home in [place].    Routed to: Dr. Clair Michelle / Nurse Sarah Rawls, RN / [facility]

## 2018-04-06 NOTE — TELEPHONE ENCOUNTER
"Reason for Call:  Other INR - returning call    Detailed comments:   Returned call - said he called last night and was told to call again.   Wants to help Malgorzata but doesn't know what is needed  When I tried to ask a question he just said \"the voicemail is on my answering machine from last night\"    Phone Number Patient can be reached at: [phone] - pt friend  Best Time: anytime    Can we leave a detailed message on this number? Not Applicable    Call taken on 4/6/2018 at 12:15 PM by Colleen Díaz      "

## 2018-04-06 NOTE — TELEPHONE ENCOUNTER
No C2C on file so unable to discuss any health information with Krystian. Per below message, pt is in a nursing home in [place] currently. Unsure if/when pt coming back to [place].     Routing as FYI to PCP     Anu COLLINS RN

## 2018-04-27 ENCOUNTER — TELEPHONE (OUTPATIENT)
Dept: FAMILY MEDICINE | Facility: CLINIC | Age: 78
End: 2018-04-27

## 2018-04-27 NOTE — TELEPHONE ENCOUNTER
I prepared it to fax back earlier this week but they may have not received it. I put a big X on it and wrote that she is now in a nursing home in [place] and may not return to [place] in the future. I am no longer managing her INRs. Please let them know. Thanks!

## 2018-04-27 NOTE — TELEPHONE ENCOUNTER
Reason for Call:  Form, our goal is to have forms completed with 72 hours, however, some forms may require a visit or additional information.    Type of letter, form or note:  [facility] a part of [facility]    Who is the form from?: SELVIN Casey     Where did the form come from: form was faxed in    What clinic location was the form placed at?: [facility]    Where the form was placed: Faxed in to day to Fax [phone]    What number is listed as a contact on the form?: Fax# [phone] listed on the bottom of the form        Additional comments: The form is already filled out for the patient to be able to continue Home INR   Just needs to be reviewed, signed and dated   Then Fax back    Call taken on 4/27/2018 at 11:54 AM by Rama Dumont

## 2018-04-27 NOTE — TELEPHONE ENCOUNTER
I called [facility] and gave them the message from Dr. Michelle below.  They will contact Ms. Lan for the information below.  Vanessa Sánchez MA

## 2018-05-30 ENCOUNTER — ANTICOAGULATION THERAPY VISIT (OUTPATIENT)
Dept: FAMILY MEDICINE | Facility: CLINIC | Age: 78
End: 2018-05-30

## 2018-05-30 DIAGNOSIS — I48.91 ATRIAL FIBRILLATION (H): ICD-10-CM

## 2018-05-30 DIAGNOSIS — Z79.01 LONG-TERM (CURRENT) USE OF ANTICOAGULANTS: ICD-10-CM

## 2018-08-13 DIAGNOSIS — E11.22 TYPE 2 DIABETES MELLITUS WITH STAGE 3 CHRONIC KIDNEY DISEASE (H): ICD-10-CM

## 2018-08-13 DIAGNOSIS — N18.30 TYPE 2 DIABETES MELLITUS WITH STAGE 3 CHRONIC KIDNEY DISEASE (H): ICD-10-CM

## 2018-08-13 NOTE — TELEPHONE ENCOUNTER
Novolin mix 70/30    Last Written Prescription Date:  09/15/17  Last Fill Quantity: 10 mL,  # refills: 5   Last office visit: 1/23/2018 with prescribing provider:  Sohail Gaston   Future Office Visit:        Routing refill request to provider for review/approval because:  Drug not on the [facility], [facility] or [facility] refill protocol or controlled substance

## 2018-08-15 RX ORDER — HUMAN INSULIN 100 [USP'U]/ML
INJECTION, SUSPENSION SUBCUTANEOUS
Qty: 10 ML | Refills: 0 | Status: SHIPPED | OUTPATIENT
Start: 2018-08-15

## 2018-08-15 NOTE — TELEPHONE ENCOUNTER
Medication is being filled for 1 time refill only due to:  Patient needs to be seen because overdue for diabetes f/u.   Noted on Rx: Needs appointment for further refills  Yessy WALKER RN

## 2018-09-17 NOTE — TELEPHONE ENCOUNTER
Reason for Call:  Other prescription    Detailed comments: Patient wants us to respond back to the Fax we get from  Fax from [facility] [employer], she only has 6 Stripes left  She wants this marked as ASAP a high priority      Phone Number Patient can be reached at: Home number on file [phone] (home)    Best Time: Anytime    Can we leave a detailed message on this number? YES    Call taken on 1/5/2017 at 3:11 PM by Cecil Macedo       Name band;

## 2021-01-11 NOTE — TELEPHONE ENCOUNTER
"Patient is over due for INR check.  Per note in Epic (demographics), she is currently in [place].  Instructions are to call mobile number through May.  I called [phone].  Reached  greeting for \"Fernando (?) Amanda\"    Left non-detailed message stating that I was \"trying to reach Malgorzata.  Please have her call INR nurse at [facility] at her convenience\".     Need to find out if she is testing her INR.  She is over due according to our last note in February.  Is her warfarin dosing being managed in [place]?   Marifer Zaragoza RN      " No

## 2022-03-29 NOTE — PROGRESS NOTES
ANTICOAGULATION FOLLOW-UP CLINIC VISIT    Patient Name:  Malgorzata Lan  Date:  3/29/2017  Contact Type:  Telephone/ Patient does home INR with [facility]. Call to patient, left detailed message on patient's VM with dosing instructions and next INR recheck.    SUBJECTIVE:     Patient Findings     Positives No Problem Findings    Comments Patient does home INR with Alere.  Call to patient, left detailed message on patient's VM with dosing instructions and next INR recheck.           OBJECTIVE    INR   Date Value Ref Range Status   03/28/2017 2.6  Final     Comment:     Patient does home INR       ASSESSMENT / PLAN  INR assessment THER    Recheck INR In: 2 WEEKS    INR Location Home INR    Billed home INR No      Anticoagulation Summary as of 3/29/2017     INR goal 2.0-3.0   Today's INR 2.6 (3/28/2017)   Maintenance plan 5 mg (2.5 mg x 2) every day   Full instructions 5 mg every day   Weekly total 35 mg   No change documented Olive Reyes RN   Plan last modified Bharati Saini RN (10/4/2016)   Next INR check 4/11/2017   Priority INR   Target end date     Indications   Long-term (current) use of anticoagulants [Z79.01] [Z79.01]  Atrial fibrillation (H) [I48.91]         Anticoagulation Episode Summary     INR check location Home Draw    Preferred lab     Send INR reminders to [facility] ANTICOAGULATION    Comments ALERE Home INR      Anticoagulation Care Providers     Provider Role Specialty Phone number    Derek Michelle Crow MD [facility] [phone]            See the Encounter Report to view Anticoagulation Flowsheet and Dosing Calendar (Go to Encounters tab in chart review, and find the Anticoagulation Therapy Visit)    Call to patient, left detailed message on patient's VM with dosing instructions and next INR recheck.  Dosage adjustment made based on physician directed care plan.    Olive Reyes, RN                How Severe Is This Condition?: mild

## 2023-02-21 NOTE — PROGRESS NOTES
[facility] [place] Progress Note    Subject: Malgorzata Lan Return today for left distal one third lateral  Calf ulcer. This is felt related to swelling.  She has difficulty with any excessive compression of the area due to pain.  She has no arterial insufficiency and we felt that the slow healing is due to the edema  She has difficulty wearing any tighter compression.  She otherwise is doing well.  She remains independent.  She is hoping to go to [place] to be with her significant other over the winter months.  Her ulcer will not be healed by January  And arrangements may need to be made down there for wound care since she is having home health care here three times weekly for dressing changes.      Exam:  /67  Pulse 67  Temp 97.8  F (36.6  C) (Tympanic)  Resp 18  LMP  (LMP Unknown)  Alert and appropriate.  Very comfortable.  Ulcerated area measures 5.5 x 7 cm the depth is 0.1 cm.  Is a layer of fibrin over the entire wound with no undermining or infection.  The granulation tissue is very edematous.    I last saw the patient and 10/16/2017 at which time the ulcer measured 7.9 x 7.2 cm and thus is decreasing in size.    Procedure:  Time out was called, informed consent obtained, and topical anesthetic of 4% lidocaine was applied per standing protocol, debridement was performed using a #15 blade down to and including subcutaneous tissue .  I excised the surface of the very edematous tissue  Skin edges were debrided on the very healthy tissue with no undermining.  She tolerated this with no discomfort. Bleeding controlled with light pressure. Patient tolerated procedure well.    Impression: Still with edematous granulation tissue.  Stress importance of  Compression.  We will stop the Endo foam since it is not helpful and use just the iodoform.  Spandigrip size E is being used and I was hoping for more aggressive compression but she cannot tolerate this.    Plan: We will dress the wounds  with Above.  Patient will return to the clinic in 2 weeks time    Yandel Fair MD  12/11/17 12:01 PM       Universal Safety Interventions

## 2024-10-08 NOTE — TELEPHONE ENCOUNTER
Abel from  Lab called said the lab arginine vasopressin was cancelled there was not enough specimen.  If any questions call [phone].   Novolin Mix         Last Written Prescription Date: 8/2016  Last Fill Quantity: 10ml, # refills: 11  Last Office Visit with [facility], [facility] or  Health prescribing provider:  8/2017     Next 5 appointments (look out 90 days)     Sep 18, 2017 10:15 AM CDT   Return Visit with Yandel Fair MD   [facility] [place] ([facility])    [address]  [address]  [place] [address]   [phone]            Nov 07, 2017  2:15 PM CST   Return Visit with Ed Shine MD   [facility] ([facility])    [address]  [place] [address] [phone]                   BP Readings from Last 3 Encounters:   08/28/17 112/62   08/09/17 100/58   07/31/17 128/76     Lab Results   Component Value Date    MICROL 99 06/10/2016     Lab Results   Component Value Date    UMALCR 100.10 06/10/2016     Creatinine   Date Value Ref Range Status   04/11/2017 1.02 0.52 - 1.04 mg/dL Final   ]  GFR Estimate   Date Value Ref Range Status   04/11/2017 52 (L) >60 mL/min/1.7m2 Final     Comment:     Non  GFR Calc   06/15/2016 41.5 ml/min/1.73m2 Final   06/08/2016 48 (L) >60 mL/min/1.7m2 Final     Comment:     Non  GFR Calc     GFR Estimate If Black   Date Value Ref Range Status   04/11/2017 63 >60 mL/min/1.7m2 Final     Comment:      GFR Calc   06/15/2016 50.3 ml/min/1.73m2 Final   06/08/2016 58 (L) >60 mL/min/1.7m2 Final     Comment:      GFR Calc     Lab Results   Component Value Date    CHOL 105 06/08/2016     Lab Results   Component Value Date    HDL 44 06/08/2016     Lab Results   Component Value Date    LDL 37 06/08/2016     Lab Results   Component Value Date    TRIG 122 06/08/2016     Lab Results   Component Value Date    CHOLHDLRATIO 3.2 03/13/2014     Lab Results   Component Value Date    AST 19 04/11/2017     Lab Results   Component Value Date    ALT 13 04/11/2017      Lab Results   Component Value Date    A1C 5.7 04/18/2016    A1C 6.0 03/30/2016    A1C 5.5 07/22/2015    A1C 6.1 12/11/2014    A1C 6.4 09/22/2014     Potassium   Date Value Ref Range Status   04/11/2017 4.0 3.4 - 5.3 mmol/L Final